# Patient Record
Sex: FEMALE | Race: BLACK OR AFRICAN AMERICAN | Employment: OTHER | URBAN - METROPOLITAN AREA
[De-identification: names, ages, dates, MRNs, and addresses within clinical notes are randomized per-mention and may not be internally consistent; named-entity substitution may affect disease eponyms.]

---

## 2019-02-12 PROBLEM — J45.901 ASTHMA EXACERBATION: Status: ACTIVE | Noted: 2019-02-12

## 2020-01-22 PROBLEM — J45.902 STATUS ASTHMATICUS: Status: ACTIVE | Noted: 2020-01-22

## 2021-10-27 PROBLEM — G45.9 TIA (TRANSIENT ISCHEMIC ATTACK): Status: ACTIVE | Noted: 2021-10-27

## 2022-02-01 ENCOUNTER — HOSPITAL ENCOUNTER (OUTPATIENT)
Dept: CT IMAGING | Age: 67
Discharge: HOME | End: 2022-02-01
Attending: SPECIALIST
Payer: MEDICARE

## 2022-02-01 ENCOUNTER — TRANSCRIBE ORDER (OUTPATIENT)
Dept: SCHEDULING | Age: 67
End: 2022-02-01

## 2022-02-01 DIAGNOSIS — R10.31 RLQ ABDOMINAL PAIN: Primary | ICD-10-CM

## 2022-02-01 DIAGNOSIS — R10.31 RLQ ABDOMINAL PAIN: ICD-10-CM

## 2022-02-01 PROCEDURE — 74176 CT ABD & PELVIS W/O CONTRAST: CPT

## 2022-03-19 PROBLEM — J45.901 ASTHMA EXACERBATION: Status: ACTIVE | Noted: 2019-02-12

## 2022-03-19 PROBLEM — J45.902 STATUS ASTHMATICUS: Status: ACTIVE | Noted: 2020-01-22

## 2022-03-19 PROBLEM — G45.9 TIA (TRANSIENT ISCHEMIC ATTACK): Status: ACTIVE | Noted: 2021-10-27

## 2022-03-23 ENCOUNTER — OFFICE VISIT (OUTPATIENT)
Dept: INTERNAL MEDICINE CLINIC | Age: 67
End: 2022-03-23
Payer: MEDICARE

## 2022-03-23 VITALS
DIASTOLIC BLOOD PRESSURE: 95 MMHG | HEIGHT: 60 IN | WEIGHT: 238 LBS | HEART RATE: 75 BPM | SYSTOLIC BLOOD PRESSURE: 139 MMHG | RESPIRATION RATE: 10 BRPM | BODY MASS INDEX: 46.72 KG/M2 | TEMPERATURE: 98.4 F | OXYGEN SATURATION: 98 %

## 2022-03-23 DIAGNOSIS — Z86.73 HISTORY OF TRANSIENT ISCHEMIC ATTACK (TIA): ICD-10-CM

## 2022-03-23 DIAGNOSIS — Z76.89 ESTABLISHING CARE WITH NEW DOCTOR, ENCOUNTER FOR: Primary | ICD-10-CM

## 2022-03-23 DIAGNOSIS — I10 PRIMARY HYPERTENSION: ICD-10-CM

## 2022-03-23 DIAGNOSIS — E66.01 MORBID OBESITY (HCC): ICD-10-CM

## 2022-03-23 DIAGNOSIS — J45.50 SEVERE PERSISTENT ASTHMA WITHOUT COMPLICATION: ICD-10-CM

## 2022-03-23 DIAGNOSIS — E78.5 HYPERLIPIDEMIA, UNSPECIFIED HYPERLIPIDEMIA TYPE: ICD-10-CM

## 2022-03-23 DIAGNOSIS — Z86.39 HISTORY OF DIABETES MELLITUS: ICD-10-CM

## 2022-03-23 DIAGNOSIS — R60.0 LEG EDEMA: ICD-10-CM

## 2022-03-23 DIAGNOSIS — M15.9 GENERALIZED OA: ICD-10-CM

## 2022-03-23 DIAGNOSIS — Q21.12 PFO (PATENT FORAMEN OVALE): ICD-10-CM

## 2022-03-23 PROCEDURE — G8536 NO DOC ELDER MAL SCRN: HCPCS | Performed by: INTERNAL MEDICINE

## 2022-03-23 PROCEDURE — 1090F PRES/ABSN URINE INCON ASSESS: CPT | Performed by: INTERNAL MEDICINE

## 2022-03-23 PROCEDURE — 3017F COLORECTAL CA SCREEN DOC REV: CPT | Performed by: INTERNAL MEDICINE

## 2022-03-23 PROCEDURE — G8417 CALC BMI ABV UP PARAM F/U: HCPCS | Performed by: INTERNAL MEDICINE

## 2022-03-23 PROCEDURE — G8752 SYS BP LESS 140: HCPCS | Performed by: INTERNAL MEDICINE

## 2022-03-23 PROCEDURE — G8427 DOCREV CUR MEDS BY ELIG CLIN: HCPCS | Performed by: INTERNAL MEDICINE

## 2022-03-23 PROCEDURE — 99204 OFFICE O/P NEW MOD 45 MIN: CPT | Performed by: INTERNAL MEDICINE

## 2022-03-23 PROCEDURE — 1101F PT FALLS ASSESS-DOCD LE1/YR: CPT | Performed by: INTERNAL MEDICINE

## 2022-03-23 PROCEDURE — G8400 PT W/DXA NO RESULTS DOC: HCPCS | Performed by: INTERNAL MEDICINE

## 2022-03-23 PROCEDURE — G8510 SCR DEP NEG, NO PLAN REQD: HCPCS | Performed by: INTERNAL MEDICINE

## 2022-03-23 PROCEDURE — G8755 DIAS BP > OR = 90: HCPCS | Performed by: INTERNAL MEDICINE

## 2022-03-23 RX ORDER — FUROSEMIDE 20 MG/1
20 TABLET ORAL DAILY
COMMUNITY
Start: 2022-03-02

## 2022-03-23 NOTE — PATIENT INSTRUCTIONS

## 2022-03-23 NOTE — PROGRESS NOTES
Chief Complaint   Patient presents with    Establish Care     Pt is here with her mom Vangie Kern    Foot Swelling    Leg Pain     Since Jan/Feb- Pt had ultrasound done 2 weeks ago at cardiologist     1. Have you been to the ER, urgent care clinic since your last visit? Hospitalized since your last visit? No    2. Have you seen or consulted any other health care providers outside of the [address] [place] [address] since your last visit? Include any pap smears or colon screening.  Yes When: Bryan Sosa- Dr. Marcelina Hahn

## 2022-03-23 NOTE — PROGRESS NOTES
Roxy Mcdonough is a 74 y.o. female and presents with [facility] (Pt is here with her mom Yesika Galarza), Foot Swelling, and Leg Pain (Since Jan/Feb- Pt had ultrasound done 2 weeks ago at cardiologist)    . Subjective:    New patient. [facility]  Moved from [facility] 2/5/2022    Pt w c/o katharine le edema chronically. Pt s/p duplex katharine le ordered by cardiology. Has f/u tomorrow    Pt w c/o katharine le weakness since TIA x 2. She did completed inpt SNF stay post hospitalization w little improvement    Chronic le pain. Told she has lumbar DJD (vs DDD)    PMH- HTN   Asthma- Dr. Tracy Centeno of [facility]   OA   Osteoporosis   PFO on chronic AC   H/o TIA   H/o kidneyy stones     PSH- katharine TKR   Gastric bypass    SH-   Lives alone   2 daughters and 4 grandchildren    FH- 6 siblings    HM  mammo- 6/2021  Colonoscopy- 1/22/2019  Immunizations  Eye care ~ 2 yrs ago  Dental care  BMD      Review of Systems  Constitutional: negative for fevers, chills, anorexia and weight loss  Eyes:   negative for visual disturbance and irritation  ENT:   negative for tinnitus,sore throat,nasal congestion,ear pains. hoarseness  Respiratory:  negative for cough, hemoptysis, dyspnea,wheezing  CV:   negative for chest pain, palpitations, lower extremity edema  GI:   negative for nausea, vomiting, diarrhea, abdominal pain,melena  Musculoskel: positive for myalgias, arthralgias, back pain, muscle weakness, joint pain  Neurological:  negative for headaches, dizziness, vertigo, memory problems and gait   Behavl/Psych: negative for feelings of anxiety, depression, mood changes    Past Medical History:   Diagnosis Date    Asthma     Diabetes ([facility] [address]     reports she is borderline diabetic     Hypertension     Kidney stone     Left ureteral stone     Renal colic on left side      Past Surgical History:   Procedure Laterality Date    HX GASTRIC BYPASS  2012    HX KNEE REPLACEMENT Bilateral     2003, 2004    HX OTHER SURGICAL 09/06/2020    Kidney stones removed at [facility] in [place], [place]     Social History     Socioeconomic History    Marital status:    Occupational History    Occupation:  - Part time     Comment: [employer]   Tobacco Use    Smoking status: Never Smoker    Smokeless tobacco: Never Used   Vaping Use    Vaping Use: Never used   Substance and Sexual Activity    Alcohol use: Yes     Comment: occasionally    Drug use: No     Family History   Problem Relation Age of Onset    Hypertension Mother     Hypertension Father     Diabetes Maternal Grandmother        No Known Allergies    Objective:  Visit Vitals  BP (!) 139/95   Pulse 75   Temp 98.4 °F (36.9 °C) (Temporal)   Resp 10   Ht 5' (1.524 m)   Wt 238 lb (108 kg)   SpO2 98%   BMI 46.48 kg/m²     Physical Exam:   General appearance - alert, obese. Pleasant  Mental status - alert, oriented to person, place, and time  EYE-ZAINA, EOMI, corneas normal, no foreign bodies  Nose - normal and patent, no erythema, discharge or polyps  Mouth - mucous membranes moist, POOR dentition  Neck - supple, no significant adenopathy   Chest - clear to auscultation, no wheezes, rales or rhonchi, symmetric air entry   Heart - normal rate, regular rhythm, normal S1, S2  Abdomen - soft, nontender, nondistended, no masses or organomegaly  Ext-obese  Skin-Warm and dry.  no hyperpigmentation, vitiligo, or suspicious lesions  Neuro -alert, oriented, normal speech, walks w cane        Results for orders placed or performed during the hospital encounter of 11/28/21   URINALYSIS W/ RFLX MICROSCOPIC   Result Value Ref Range    Color STRAW YELLOW,STRAW      Appearance CLEAR CLEAR      Glucose NEGATIVE NEGATIVE,Negative mg/dl    Bilirubin NEGATIVE NEGATIVE,Negative      Ketone NEGATIVE NEGATIVE,Negative mg/dl    Specific gravity 1.015 1.005 - 1.030      Blood MODERATE (A) NEGATIVE,Negative      pH (UA) 6.0 5.0 - 9.0      Protein NEGATIVE NEGATIVE,Negative mg/dl Urobilinogen 0.2 0.0 - 1.0 mg/dl    Nitrites NEGATIVE NEGATIVE,Negative      Leukocyte Esterase NEGATIVE NEGATIVE,Negative     CBC WITH AUTOMATED DIFF   Result Value Ref Range    WBC 8.6 4.0 - 11.0 1000/mm3    RBC 3.66 3.60 - 5.20 M/uL    HGB 9.4 (L) 13.0 - 17.2 gm/dl    HCT 29.1 (L) 37.0 - 50.0 %    MCV 79.5 (L) 80.0 - 98.0 fL    MCH 25.7 25.4 - 34.6 pg    MCHC 32.3 30.0 - 36.0 gm/dl    PLATELET 797 841 - 417 1000/mm3    MPV 11.6 (H) 6.0 - 10.0 fL    RDW-SD 42.1 36.4 - 46.3      NRBC 0 0 - 0      IMMATURE GRANULOCYTES 0.4 0.0 - 3.0 %    NEUTROPHILS 69.9 (H) 34 - 64 %    LYMPHOCYTES 20.0 (L) 28 - 48 %    MONOCYTES 9.5 1 - 13 %    EOSINOPHILS 0.0 0 - 5 %    BASOPHILS 0.2 0 - 3 %   METABOLIC PANEL, BASIC   Result Value Ref Range    Sodium 141 136 - 145 mEq/L    Potassium 4.2 3.5 - 5.1 mEq/L    Chloride 111 (H) 98 - 107 mEq/L    CO2 27 21 - 32 mEq/L    Glucose 107 (H) 74 - 106 mg/dl    BUN 8 7 - 25 mg/dl    Creatinine 0.6 0.6 - 1.3 mg/dl    GFR est AA >60.0      GFR est non-AA >60      Calcium 10.2 (H) 8.5 - 10.1 mg/dl    Anion gap 4 (L) 5 - 15 mmol/L   POC URINE MICROSCOPIC   Result Value Ref Range    Epithelial cells, squamous 1-4 /LPF    WBC OCCASIONAL /HPF    RBC 10-14 /HPF    Bacteria 1+ /HPF       Assessment/Plan:    ICD-10-CM ICD-9-CM    1. Establishing care with new doctor, encounter for  Z76.89 V65.8    2. Primary hypertension  G47 711.3 METABOLIC PANEL, COMPREHENSIVE      LIPID PANEL      CBC WITH AUTOMATED DIFF      THYROID CASCADE PROFILE      HEMOGLOBIN A1C WITH EAG      REFERRAL TO OPHTHALMOLOGY      METABOLIC PANEL, COMPREHENSIVE      LIPID PANEL      CBC WITH AUTOMATED DIFF      THYROID CASCADE PROFILE      HEMOGLOBIN A1C WITH EAG   3.  Hyperlipidemia, unspecified hyperlipidemia type  Z62.9 177.5 METABOLIC PANEL, COMPREHENSIVE      LIPID PANEL      CBC WITH AUTOMATED DIFF      THYROID CASCADE PROFILE      HEMOGLOBIN A1C WITH EAG      REFERRAL TO OPHTHALMOLOGY      METABOLIC PANEL, COMPREHENSIVE LIPID PANEL      CBC WITH AUTOMATED DIFF      THYROID CASCADE PROFILE      HEMOGLOBIN A1C WITH EAG   4. Morbid obesity (HCC)  Q96.78 852.86 METABOLIC PANEL, COMPREHENSIVE      LIPID PANEL      CBC WITH AUTOMATED DIFF      THYROID CASCADE PROFILE      HEMOGLOBIN A1C WITH EAG      REFERRAL TO OPHTHALMOLOGY      METABOLIC PANEL, COMPREHENSIVE      LIPID PANEL      CBC WITH AUTOMATED DIFF      THYROID CASCADE PROFILE      HEMOGLOBIN A1C WITH EAG   5. History of diabetes mellitus  Z86.39 V12.29 HEMOGLOBIN A1C WITH EAG      HEMOGLOBIN A1C WITH EAG   6. History of transient ischemic attack (TIA)  Z86.73 V12.54    7. Leg edema  R60.0 782.3    8. Generalized OA  M15.9 715.00    9. Severe persistent asthma without complication  V59.69 654.17      Orders Placed This Encounter    METABOLIC PANEL, COMPREHENSIVE     Standing Status:   Future     Number of Occurrences:   1     Standing Expiration Date:   3/23/2023    LIPID PANEL     Standing Status:   Future     Number of Occurrences:   1     Standing Expiration Date:   3/23/2023    CBC WITH AUTOMATED DIFF     Standing Status:   Future     Number of Occurrences:   1     Standing Expiration Date:   3/23/2023    THYROID CASCADE PROFILE     Standing Status:   Future     Number of Occurrences:   1     Standing Expiration Date:   3/23/2023    HEMOGLOBIN A1C WITH EAG     Standing Status:   Future     Number of Occurrences:   1     Standing Expiration Date:   3/23/2023    REFERRAL TO OPHTHALMOLOGY     Referral Priority:   Routine     Referral Type:   Consultation     Referral Reason:   Specialty Services Required     Requested Specialty:   Ophthalmology     Number of Visits Requested:   1    furosemide (LASIX) 20 mg tablet     Sig: Take 20 mg by mouth daily. 1. Establishing care with new doctor, encounter for  Completed    2. Primary hypertension    - METABOLIC PANEL, COMPREHENSIVE; Future  - LIPID PANEL; Future  - CBC WITH AUTOMATED DIFF;  Future  - THYROID CASCADE PROFILE; Future  - HEMOGLOBIN A1C WITH EAG; Future  - REFERRAL TO OPHTHALMOLOGY  - METABOLIC PANEL, COMPREHENSIVE  - LIPID PANEL  - CBC WITH AUTOMATED DIFF  - THYROID CASCADE PROFILE  - HEMOGLOBIN A1C WITH EAG    3. Hyperlipidemia, unspecified hyperlipidemia type    - METABOLIC PANEL, COMPREHENSIVE; Future  - LIPID PANEL; Future  - CBC WITH AUTOMATED DIFF; Future  - THYROID CASCADE PROFILE; Future  - HEMOGLOBIN A1C WITH EAG; Future  - REFERRAL TO OPHTHALMOLOGY  - METABOLIC PANEL, COMPREHENSIVE  - LIPID PANEL  - CBC WITH AUTOMATED DIFF  - THYROID CASCADE PROFILE  - HEMOGLOBIN A1C WITH EAG    4. Morbid obesity ([address]  D/w pt daily walking (at least 20 minutes), healthy diet w fruits and/or veggies w each meal, portion sizes and weight loss    - METABOLIC PANEL, COMPREHENSIVE; Future  - LIPID PANEL; Future  - CBC WITH AUTOMATED DIFF; Future  - THYROID CASCADE PROFILE; Future  - HEMOGLOBIN A1C WITH EAG; Future  - REFERRAL TO OPHTHALMOLOGY  - METABOLIC PANEL, COMPREHENSIVE  - LIPID PANEL  - CBC WITH AUTOMATED DIFF  - THYROID CASCADE PROFILE  - HEMOGLOBIN A1C WITH EAG    5. History of diabetes mellitus    - HEMOGLOBIN A1C WITH EAG; Future  - HEMOGLOBIN A1C WITH EAG    6. History of transient ischemic attack (TIA)  Noted  [facility]    7. Leg edema  D/w pt daily walking/le elevation and low salt diet    8. Generalized OA  Noted  [facility]    9. Severe persistent asthma without complication  Cont current tx    10. PFO (patent foramen ovale)  F/u cards    Patient Instructions        Low Sodium Diet (2,000 Milligram): Care Instructions  Overview     Limiting sodium can be an important part of managing some health problems. The most common source of sodium is salt. People get most of the salt in their diet from canned, prepared, and packaged foods. Fast food and restaurant meals also are very high in sodium. Your doctor will probably limit your sodium to less than 2,000 milligrams (mg) a day.  This limit counts all the sodium in prepared and packaged foods and any salt you add to your food. Follow-up care is a key part of your treatment and safety. Be sure to make and go to all appointments, and call your doctor if you are having problems. It's also a good idea to know your test results and keep a list of the medicines you take. How can you care for yourself at home? Read food labels  · Read labels on cans and food packages. The labels tell you how much sodium is in each serving. Make sure that you look at the serving size. If you eat more than the serving size, you have eaten more sodium. · Food labels also tell you the Percent Daily Value for sodium. Choose products with low Percent Daily Values for sodium. · Be aware that sodium can come in forms other than salt, including monosodium glutamate (MSG), sodium citrate, and sodium bicarbonate (baking soda). MSG is often added to Asian food. When you eat out, you can sometimes ask for food without MSG or added salt. Buy low-sodium foods  · Buy foods that are labeled \"unsalted\" (no salt added), \"sodium-free\" (less than 5 mg of sodium per serving), or \"low-sodium\" (140 mg or less of sodium per serving). Foods labeled \"reduced-sodium\" and \"light sodium\" may still have too much sodium. Be sure to read the label to see how much sodium you are getting. · Buy fresh vegetables, or frozen vegetables without added sauces. Buy low-sodium versions of canned vegetables, soups, and other canned goods. Prepare low-sodium meals  · Cut back on the amount of salt you use in cooking. This will help you adjust to the taste. Do not add salt after cooking. One teaspoon of salt has about 2,300 mg of sodium. · Take the salt shaker off the table. · Flavor your food with garlic, lemon juice, onion, vinegar, herbs, and spices. Do not use soy sauce, lite soy sauce, steak sauce, onion salt, garlic salt, celery salt, or ketchup on your food.   · Use low-sodium salad dressings, sauces, and ketchup. Or make your own salad dressings and sauces without adding salt. · Use less salt (or none) when recipes call for it. You can often use half the salt a recipe calls for without losing flavor. Other foods such as rice, pasta, and grains do not need added salt. · Rinse canned vegetables, and cook them in fresh water. This removes some--but not all--of the salt. · Avoid water that is naturally high in sodium or that has been treated with water softeners, which add sodium. If you buy bottled water, read the label and choose a sodium-free brand. Avoid high-sodium foods  · Avoid eating:  ? Smoked, cured, salted, and canned meat, fish, and poultry. ? Ham, sneed, hot dogs, and luncheon meats. ? Regular, hard, and processed cheese and regular peanut butter. ? Crackers with salted tops, and other salted snack foods such as pretzels, chips, and salted popcorn. ? Frozen prepared meals, unless labeled low-sodium. ? Canned and dried soups, broths, and bouillon, unless labeled sodium-free or low-sodium. ? Canned vegetables, unless labeled sodium-free or low-sodium. ? Western Brenda fries, pizza, tacos, and other fast foods. ? Pickles, olives, ketchup, and other condiments, especially soy sauce, unless labeled sodium-free or low-sodium. Where can you learn more? Go to http://www.gray.com/  Enter V843 in the search box to learn more about \"Low Sodium Diet (2,000 Milligram): Care Instructions. \"  Current as of: September 8, 2021               Content Version: 13.2  © 5798-4741 [employer]. Care instructions adapted under license by [employer] (which disclaims liability or warranty for this information). If you have questions about a medical condition or this instruction, always ask your healthcare professional. [address]  any warranty or liability for your use of this information.          Follow-up and Dispositions    · Return in about 8 weeks (around 5/18/2022). I have reviewed with the patient details of the assessment and plan and all questions were answered. Relevent patient education was performed. The most recent lab findings were reviewed with the patient. An After Visit Summary was printed and given to the patient.       Aravind Carr MD

## 2022-03-24 PROBLEM — E78.5 HYPERLIPIDEMIA, UNSPECIFIED HYPERLIPIDEMIA TYPE: Status: ACTIVE | Noted: 2022-03-23

## 2022-03-24 PROBLEM — Z86.73 HISTORY OF TRANSIENT ISCHEMIC ATTACK (TIA): Status: ACTIVE | Noted: 2022-03-23

## 2022-03-24 PROBLEM — I10 PRIMARY HYPERTENSION: Status: ACTIVE | Noted: 2022-03-23

## 2022-03-24 PROBLEM — E66.01 MORBID OBESITY (HCC): Status: ACTIVE | Noted: 2022-03-23

## 2022-03-24 PROBLEM — Q21.12 PFO (PATENT FORAMEN OVALE): Status: ACTIVE | Noted: 2022-03-23

## 2022-03-24 PROBLEM — J45.50 SEVERE PERSISTENT ASTHMA WITHOUT COMPLICATION: Status: ACTIVE | Noted: 2022-03-23

## 2022-03-24 PROBLEM — Z86.39 HISTORY OF DIABETES MELLITUS: Status: ACTIVE | Noted: 2022-03-23

## 2022-03-24 PROBLEM — R60.0 LEG EDEMA: Status: ACTIVE | Noted: 2022-03-23

## 2022-03-24 PROBLEM — M15.9 GENERALIZED OA: Status: ACTIVE | Noted: 2022-03-23

## 2022-03-24 LAB
ALBUMIN SERPL-MCNC: 4.2 G/DL (ref 3.8–4.8)
ALBUMIN/GLOB SERPL: 1.9 {RATIO} (ref 1.2–2.2)
ALP SERPL-CCNC: 121 IU/L (ref 44–121)
ALT SERPL-CCNC: 23 IU/L (ref 0–32)
AST SERPL-CCNC: 26 IU/L (ref 0–40)
BASOPHILS # BLD AUTO: 0 X10E3/UL (ref 0–0.2)
BASOPHILS NFR BLD AUTO: 0 %
BILIRUB SERPL-MCNC: <0.2 MG/DL (ref 0–1.2)
BUN SERPL-MCNC: 13 MG/DL (ref 8–27)
BUN/CREAT SERPL: 17 (ref 12–28)
CALCIUM SERPL-MCNC: 10 MG/DL (ref 8.7–10.3)
CHLORIDE SERPL-SCNC: 102 MMOL/L (ref 96–106)
CHOLEST SERPL-MCNC: 181 MG/DL (ref 100–199)
CO2 SERPL-SCNC: 24 MMOL/L (ref 20–29)
CREAT SERPL-MCNC: 0.77 MG/DL (ref 0.57–1)
EGFR: 84 ML/MIN/1.73
EOSINOPHIL # BLD AUTO: 0 X10E3/UL (ref 0–0.4)
EOSINOPHIL NFR BLD AUTO: 0 %
ERYTHROCYTE [DISTWIDTH] IN BLOOD BY AUTOMATED COUNT: 13.7 % (ref 11.7–15.4)
EST. AVERAGE GLUCOSE BLD GHB EST-MCNC: 123 MG/DL
GLOBULIN SER CALC-MCNC: 2.2 G/DL (ref 1.5–4.5)
GLUCOSE SERPL-MCNC: 86 MG/DL (ref 65–99)
HBA1C MFR BLD: 5.9 % (ref 4.8–5.6)
HCT VFR BLD AUTO: 40.9 % (ref 34–46.6)
HDLC SERPL-MCNC: 92 MG/DL
HGB BLD-MCNC: 13.5 G/DL (ref 11.1–15.9)
IMM GRANULOCYTES # BLD AUTO: 0 X10E3/UL (ref 0–0.1)
IMM GRANULOCYTES NFR BLD AUTO: 0 %
IMP & REVIEW OF LAB RESULTS: NORMAL
LDLC SERPL CALC-MCNC: 77 MG/DL (ref 0–99)
LYMPHOCYTES # BLD AUTO: 2.4 X10E3/UL (ref 0.7–3.1)
LYMPHOCYTES NFR BLD AUTO: 32 %
MCH RBC QN AUTO: 25.7 PG (ref 26.6–33)
MCHC RBC AUTO-ENTMCNC: 33 G/DL (ref 31.5–35.7)
MCV RBC AUTO: 78 FL (ref 79–97)
MONOCYTES # BLD AUTO: 0.6 X10E3/UL (ref 0.1–0.9)
MONOCYTES NFR BLD AUTO: 8 %
NEUTROPHILS # BLD AUTO: 4.4 X10E3/UL (ref 1.4–7)
NEUTROPHILS NFR BLD AUTO: 60 %
PLATELET # BLD AUTO: 213 X10E3/UL (ref 150–450)
POTASSIUM SERPL-SCNC: 3.9 MMOL/L (ref 3.5–5.2)
PROT SERPL-MCNC: 6.4 G/DL (ref 6–8.5)
RBC # BLD AUTO: 5.26 X10E6/UL (ref 3.77–5.28)
SODIUM SERPL-SCNC: 143 MMOL/L (ref 134–144)
TRIGL SERPL-MCNC: 60 MG/DL (ref 0–149)
TSH SERPL DL<=0.005 MIU/L-ACNC: 0.93 UIU/ML (ref 0.45–4.5)
VLDLC SERPL CALC-MCNC: 12 MG/DL (ref 5–40)
WBC # BLD AUTO: 7.5 X10E3/UL (ref 3.4–10.8)

## 2022-06-01 ENCOUNTER — OFFICE VISIT (OUTPATIENT)
Dept: INTERNAL MEDICINE CLINIC | Age: 67
End: 2022-06-01
Payer: MEDICARE

## 2022-06-01 VITALS
DIASTOLIC BLOOD PRESSURE: 89 MMHG | HEART RATE: 78 BPM | WEIGHT: 239 LBS | SYSTOLIC BLOOD PRESSURE: 143 MMHG | RESPIRATION RATE: 18 BRPM | TEMPERATURE: 97 F | HEIGHT: 60 IN | OXYGEN SATURATION: 98 % | BODY MASS INDEX: 46.92 KG/M2

## 2022-06-01 DIAGNOSIS — E78.5 HYPERLIPIDEMIA, UNSPECIFIED HYPERLIPIDEMIA TYPE: ICD-10-CM

## 2022-06-01 DIAGNOSIS — J45.50 SEVERE PERSISTENT ASTHMA WITHOUT COMPLICATION: ICD-10-CM

## 2022-06-01 DIAGNOSIS — Z98.84 BARIATRIC SURGERY STATUS: ICD-10-CM

## 2022-06-01 DIAGNOSIS — E66.01 MORBID OBESITY (HCC): ICD-10-CM

## 2022-06-01 DIAGNOSIS — Z86.73 HISTORY OF TRANSIENT ISCHEMIC ATTACK (TIA): ICD-10-CM

## 2022-06-01 DIAGNOSIS — M15.9 GENERALIZED OA: ICD-10-CM

## 2022-06-01 DIAGNOSIS — I10 PRIMARY HYPERTENSION: Primary | ICD-10-CM

## 2022-06-01 DIAGNOSIS — Q21.12 PFO (PATENT FORAMEN OVALE): ICD-10-CM

## 2022-06-01 DIAGNOSIS — Z12.31 BREAST CANCER SCREENING BY MAMMOGRAM: ICD-10-CM

## 2022-06-01 PROBLEM — G45.9 TIA (TRANSIENT ISCHEMIC ATTACK): Status: RESOLVED | Noted: 2021-10-27 | Resolved: 2022-06-01

## 2022-06-01 PROBLEM — J45.902 STATUS ASTHMATICUS: Status: RESOLVED | Noted: 2020-01-22 | Resolved: 2022-06-01

## 2022-06-01 PROBLEM — J45.901 ASTHMA EXACERBATION: Status: RESOLVED | Noted: 2019-02-12 | Resolved: 2022-06-01

## 2022-06-01 PROCEDURE — G8754 DIAS BP LESS 90: HCPCS | Performed by: INTERNAL MEDICINE

## 2022-06-01 PROCEDURE — G8536 NO DOC ELDER MAL SCRN: HCPCS | Performed by: INTERNAL MEDICINE

## 2022-06-01 PROCEDURE — G8400 PT W/DXA NO RESULTS DOC: HCPCS | Performed by: INTERNAL MEDICINE

## 2022-06-01 PROCEDURE — G8510 SCR DEP NEG, NO PLAN REQD: HCPCS | Performed by: INTERNAL MEDICINE

## 2022-06-01 PROCEDURE — G8427 DOCREV CUR MEDS BY ELIG CLIN: HCPCS | Performed by: INTERNAL MEDICINE

## 2022-06-01 PROCEDURE — G8417 CALC BMI ABV UP PARAM F/U: HCPCS | Performed by: INTERNAL MEDICINE

## 2022-06-01 PROCEDURE — 3017F COLORECTAL CA SCREEN DOC REV: CPT | Performed by: INTERNAL MEDICINE

## 2022-06-01 PROCEDURE — 99214 OFFICE O/P EST MOD 30 MIN: CPT | Performed by: INTERNAL MEDICINE

## 2022-06-01 PROCEDURE — 1090F PRES/ABSN URINE INCON ASSESS: CPT | Performed by: INTERNAL MEDICINE

## 2022-06-01 PROCEDURE — G9899 SCRN MAM PERF RSLTS DOC: HCPCS | Performed by: INTERNAL MEDICINE

## 2022-06-01 PROCEDURE — G8753 SYS BP > OR = 140: HCPCS | Performed by: INTERNAL MEDICINE

## 2022-06-01 PROCEDURE — 1101F PT FALLS ASSESS-DOCD LE1/YR: CPT | Performed by: INTERNAL MEDICINE

## 2022-06-01 RX ORDER — ATORVASTATIN CALCIUM 40 MG/1
40 TABLET, FILM COATED ORAL
Qty: 90 TABLET | Refills: 1 | Status: SHIPPED | OUTPATIENT
Start: 2022-06-01

## 2022-06-01 RX ORDER — CHOLECALCIFEROL (VITAMIN D3) 125 MCG
1 CAPSULE ORAL DAILY
Qty: 90 TABLET | Refills: 5 | Status: SHIPPED | OUTPATIENT
Start: 2022-06-01

## 2022-06-01 NOTE — PROGRESS NOTES
Marito Cordova is a 74 y.o. female and presents with Follow-up (Questions about Lipitor may need refills  and Vitamin D)    . Subjective:    Second appointmemt (mother is Marcela Musa)  Moved from [facility] 2/5/2022    Pt w c/o katharine le edema chronically. Pt s/p duplex katharine le ordered by cardiology. Negative    Pt was referred by cardiology to neurology re: closure of PFO    Pt w c/o katharine le weakness since TIA x 2. She is enrolled in PT- [facility] in [facility]    Chronic le pain. Told she has lumbar DJD (vs DDD)    Pt is very interested in simplifying her med regimen. PMH- HTN  BP Readings from Last 3 Encounters:   06/01/22 (!) 143/89   03/23/22 (!) 139/95   01/26/22 (!) 142/89        Asthma- Dr. Benitez Wilson of [facility]  Lab Results   Component Value Date/Time    Cholesterol, total 181 03/23/2022 02:39 PM    HDL Cholesterol 92 03/23/2022 02:39 PM    LDL, calculated 77 03/23/2022 02:39 PM    LDL, calculated 41 10/28/2021 04:34 AM    VLDL, calculated 12 03/23/2022 02:39 PM    Triglyceride 60 03/23/2022 02:39 PM    CHOL/HDL Ratio 1.5 10/28/2021 04:34 AM      OA   Osteoporosis   PFO on chronic AC   H/o TIA   H/o kidney stones     PSH- katharine TKR   Gastric bypass    SH-   Lives alone   2 daughters and 4 grandchildren    FH- 6 siblings    HM  mammo- 6/2021  Colonoscopy- 1/22/2019  Immunizations  Eye care ~ 2 yrs ago  Dental care  BMD      Review of Systems  Constitutional: negative for fevers, chills, anorexia and weight loss  Eyes:   negative for visual disturbance and irritation  ENT:   negative for tinnitus,sore throat,nasal congestion,ear pains. hoarseness  Respiratory:  negative for cough, hemoptysis, dyspnea,wheezing  CV:   negative for chest pain, palpitations, lower extremity edema  GI:   negative for nausea, vomiting, diarrhea, abdominal pain,melena  Musculoskel: positive for myalgias, arthralgias, back pain, muscle weakness, joint pain  Neurological:  negative for headaches, dizziness, vertigo, memory problems and gait   Behavl/Psych: negative for feelings of anxiety, depression, mood changes    Past Medical History:   Diagnosis Date    Asthma     Diabetes ([address]     reports she is borderline diabetic     Hypertension     Kidney stone     Left ureteral stone     Renal colic on left side      Past Surgical History:   Procedure Laterality Date    HX GASTRIC BYPASS  2012    HX KNEE REPLACEMENT Bilateral     2003, 2004    HX OTHER SURGICAL  09/06/2020    Kidney stones removed at [facility] in [address], [address] [facility]     Social History     Socioeconomic History    Marital status:    Occupational History    Occupation:  - Part time     Comment: [employer]   Tobacco Use    Smoking status: Never Smoker    Smokeless tobacco: Never Used   Vaping Use    Vaping Use: Never used   Substance and Sexual Activity    Alcohol use: Yes     Comment: occasionally    Drug use: No     Family History   Problem Relation Age of Onset    Hypertension Mother     Hypertension Father     Diabetes Maternal Grandmother        No Known Allergies    Objective:  Visit Vitals  BP (!) 143/89 (BP 1 Location: Left upper arm, BP Patient Position: Sitting, BP Cuff Size: Large adult)   Pulse 78   Temp 97 °F (36.1 °C) (Temporal)   Resp 18   Ht 5' (1.524 m)   Wt 239 lb (108.4 kg)   SpO2 98%   BMI 46.68 kg/m²     Physical Exam:   General appearance - alert, obese. Pleasant  Mental status - alert, oriented to person, place, and time  EYE-ZAINA, EOMI, corneas normal, no foreign bodies  Nose - normal and patent, no erythema, discharge or polyps  Mouth - mucous membranes moist, POOR dentition  Neck - supple, no significant adenopathy   Chest - clear to auscultation, no wheezes, rales or rhonchi, symmetric air entry   Heart - normal rate, regular rhythm, normal S1, S2  Abdomen - soft, nontender, nondistended, no masses or organomegaly  Ext-obese  Skin-Warm and dry.  no hyperpigmentation, vitiligo, or suspicious lesions  Neuro -alert, oriented, normal speech, walks w cane        Results for orders placed or performed in visit on 23/02/87   METABOLIC PANEL, COMPREHENSIVE   Result Value Ref Range    Glucose 86 65 - 99 mg/dL    BUN 13 8 - 27 mg/dL    Creatinine 0.77 0.57 - 1.00 mg/dL    eGFR 84 >59 mL/min/1.73    BUN/Creatinine ratio 17 12 - 28    Sodium 143 134 - 144 mmol/L    Potassium 3.9 3.5 - 5.2 mmol/L    Chloride 102 96 - 106 mmol/L    CO2 24 20 - 29 mmol/L    Calcium 10.0 8.7 - 10.3 mg/dL    Protein, total 6.4 6.0 - 8.5 g/dL    Albumin 4.2 3.8 - 4.8 g/dL    GLOBULIN, TOTAL 2.2 1.5 - 4.5 g/dL    A-G Ratio 1.9 1.2 - 2.2    Bilirubin, total <0.2 0.0 - 1.2 mg/dL    Alk. phosphatase 121 44 - 121 IU/L    AST (SGOT) 26 0 - 40 IU/L    ALT (SGPT) 23 0 - 32 IU/L   LIPID PANEL   Result Value Ref Range    Cholesterol, total 181 100 - 199 mg/dL    Triglyceride 60 0 - 149 mg/dL    HDL Cholesterol 92 >39 mg/dL    VLDL, calculated 12 5 - 40 mg/dL    LDL, calculated 77 0 - 99 mg/dL   CBC WITH AUTOMATED DIFF   Result Value Ref Range    WBC 7.5 3.4 - 10.8 x10E3/uL    RBC 5.26 3.77 - 5.28 x10E6/uL    HGB 13.5 11.1 - 15.9 g/dL    HCT 40.9 34.0 - 46.6 %    MCV 78 (L) 79 - 97 fL    MCH 25.7 (L) 26.6 - 33.0 pg    MCHC 33.0 31.5 - 35.7 g/dL    RDW 13.7 11.7 - 15.4 %    PLATELET 182 461 - 637 x10E3/uL    NEUTROPHILS 60 Not Estab. %    Lymphocytes 32 Not Estab. %    MONOCYTES 8 Not Estab. %    EOSINOPHILS 0 Not Estab. %    BASOPHILS 0 Not Estab. %    ABS. NEUTROPHILS 4.4 1.4 - 7.0 x10E3/uL    Abs Lymphocytes 2.4 0.7 - 3.1 x10E3/uL    ABS. MONOCYTES 0.6 0.1 - 0.9 x10E3/uL    ABS. EOSINOPHILS 0.0 0.0 - 0.4 x10E3/uL    ABS. BASOPHILS 0.0 0.0 - 0.2 x10E3/uL    IMMATURE GRANULOCYTES 0 Not Estab. %    ABS. IMM.  GRANS. 0.0 0.0 - 0.1 x10E3/uL   THYROID CASCADE PROFILE   Result Value Ref Range    TSH 0.925 0.450 - 4.500 uIU/mL   HEMOGLOBIN A1C WITH EAG   Result Value Ref Range    Hemoglobin A1c 5.9 (H) 4.8 - 5.6 %    Estimated average glucose 123 mg/dL CVD REPORT   Result Value Ref Range    INTERPRETATION Note        Assessment/Plan:    ICD-10-CM ICD-9-CM    1. Primary hypertension  I10 401.9    2. Hyperlipidemia, unspecified hyperlipidemia type  E78.5 272.4    3. History of transient ischemic attack (TIA)  Z86.73 V12.54 atorvastatin (LIPITOR) 40 mg tablet   4. Morbid obesity ([address]  E66.01 278.01    5. Generalized OA  M15.9 715.00    6. Severe persistent asthma without complication  A62.61 348.00    7. Bariatric surgery status  Z98.84 V45.86    8. PFO (patent foramen ovale)  Q21.1 745.5    9. Breast cancer screening by mammogram  Z12.31 V76.12 [facility] MAMMO BI SCREENING INCL CAD     Orders Placed This Encounter    [facility] MAMMO BI SCREENING INCL CAD     Standing Status:   Future     Standing Expiration Date:   12/1/2022    cholecalciferol, vitamin D3, (Vitamin D3) 50 mcg (2,000 unit) tab     Sig: Take 1 Tablet by mouth daily. Dispense:  90 Tablet     Refill:  5    atorvastatin (LIPITOR) 40 mg tablet     Sig: Take 1 Tablet by mouth nightly. Dispense:  90 Tablet     Refill:  1     1. Primary hypertension  NOT ideal  Cont amlodipine  May be contributing to pts le edema. T/c change    2. Hyperlipidemia, unspecified hyperlipidemia type  Cont statin    3. History of transient ischemic attack (TIA)    - atorvastatin (LIPITOR) 40 mg tablet; Take 1 Tablet by mouth nightly. Dispense: 90 Tablet; Refill: 1    4. Morbid obesity ([facility] [address] [facility]  D/w pt daily walking (at least 20 minutes), healthy diet w fruits and/or veggies w each meal, portion sizes and weight loss    5. Generalized OA  Cont PT    6. Severe persistent asthma without complication  Quiescent    7. Bariatric surgery status  Noted    8. PFO (patent foramen ovale)  F/u cards and neuro    9. Breast cancer screening by mammogram    - [facility] MAMMO BI SCREENING INCL CAD; Future      There are no Patient Instructions on file for this visit. Follow-up and Dispositions    · Return in about 4 months (around 10/1/2022). I have reviewed with the patient details of the assessment and plan and all questions were answered. Relevent patient education was performed. The most recent lab findings were reviewed with the patient. An After Visit Summary was printed and given to the patient.       Rosmery Davies MD

## 2022-06-01 NOTE — PROGRESS NOTES
Seymour Navarro is a 74 y.o. female  Chief Complaint   Patient presents with    Follow-up     Questions about Lipitor may need refills  and Vitamin D     1. Have you been to the ER, no urgent care clinic since your last visit?no  Hospitalized since your last visit? no  2. Have you seen or consulted any other health care providers outside of the [address] [place] [address] since your last visit? no  Include any pap smears or colon screening.   no

## 2022-06-27 ENCOUNTER — HOSPITAL ENCOUNTER (OUTPATIENT)
Dept: MAMMOGRAPHY | Age: 67
Discharge: HOME | End: 2022-06-27
Payer: MEDICARE

## 2022-06-27 DIAGNOSIS — Z12.31 BREAST CANCER SCREENING BY MAMMOGRAM: ICD-10-CM

## 2022-06-27 PROCEDURE — 77063 BREAST TOMOSYNTHESIS BI: CPT

## 2022-10-03 ENCOUNTER — OFFICE VISIT (OUTPATIENT)
Dept: INTERNAL MEDICINE CLINIC | Age: 67
End: 2022-10-03
Payer: MEDICARE

## 2022-10-03 VITALS
HEIGHT: 60 IN | HEART RATE: 95 BPM | RESPIRATION RATE: 17 BRPM | TEMPERATURE: 98.4 F | OXYGEN SATURATION: 98 % | BODY MASS INDEX: 47.74 KG/M2 | DIASTOLIC BLOOD PRESSURE: 90 MMHG | SYSTOLIC BLOOD PRESSURE: 150 MMHG | WEIGHT: 243.2 LBS

## 2022-10-03 DIAGNOSIS — Q21.12 PFO (PATENT FORAMEN OVALE): ICD-10-CM

## 2022-10-03 DIAGNOSIS — Z00.00 ENCOUNTER FOR MEDICARE ANNUAL WELLNESS EXAM: ICD-10-CM

## 2022-10-03 DIAGNOSIS — M15.9 GENERALIZED OA: ICD-10-CM

## 2022-10-03 DIAGNOSIS — I87.2 CHRONIC VENOUS INSUFFICIENCY: ICD-10-CM

## 2022-10-03 DIAGNOSIS — E78.5 HYPERLIPIDEMIA, UNSPECIFIED HYPERLIPIDEMIA TYPE: ICD-10-CM

## 2022-10-03 DIAGNOSIS — I10 PRIMARY HYPERTENSION: Primary | ICD-10-CM

## 2022-10-03 DIAGNOSIS — Z98.84 BARIATRIC SURGERY STATUS: ICD-10-CM

## 2022-10-03 DIAGNOSIS — J45.50 SEVERE PERSISTENT ASTHMA WITHOUT COMPLICATION: ICD-10-CM

## 2022-10-03 DIAGNOSIS — Z23 ENCOUNTER FOR IMMUNIZATION: ICD-10-CM

## 2022-10-03 DIAGNOSIS — E66.01 MORBID OBESITY (HCC): ICD-10-CM

## 2022-10-03 PROCEDURE — 1090F PRES/ABSN URINE INCON ASSESS: CPT | Performed by: INTERNAL MEDICINE

## 2022-10-03 PROCEDURE — 90694 VACC AIIV4 NO PRSRV 0.5ML IM: CPT | Performed by: INTERNAL MEDICINE

## 2022-10-03 PROCEDURE — G9899 SCRN MAM PERF RSLTS DOC: HCPCS | Performed by: INTERNAL MEDICINE

## 2022-10-03 PROCEDURE — G0008 ADMIN INFLUENZA VIRUS VAC: HCPCS | Performed by: INTERNAL MEDICINE

## 2022-10-03 PROCEDURE — G8536 NO DOC ELDER MAL SCRN: HCPCS | Performed by: INTERNAL MEDICINE

## 2022-10-03 PROCEDURE — G8753 SYS BP > OR = 140: HCPCS | Performed by: INTERNAL MEDICINE

## 2022-10-03 PROCEDURE — G0439 PPPS, SUBSEQ VISIT: HCPCS | Performed by: INTERNAL MEDICINE

## 2022-10-03 PROCEDURE — G8510 SCR DEP NEG, NO PLAN REQD: HCPCS | Performed by: INTERNAL MEDICINE

## 2022-10-03 PROCEDURE — 3017F COLORECTAL CA SCREEN DOC REV: CPT | Performed by: INTERNAL MEDICINE

## 2022-10-03 PROCEDURE — 1101F PT FALLS ASSESS-DOCD LE1/YR: CPT | Performed by: INTERNAL MEDICINE

## 2022-10-03 PROCEDURE — G8755 DIAS BP > OR = 90: HCPCS | Performed by: INTERNAL MEDICINE

## 2022-10-03 PROCEDURE — G8427 DOCREV CUR MEDS BY ELIG CLIN: HCPCS | Performed by: INTERNAL MEDICINE

## 2022-10-03 PROCEDURE — G8417 CALC BMI ABV UP PARAM F/U: HCPCS | Performed by: INTERNAL MEDICINE

## 2022-10-03 PROCEDURE — G8400 PT W/DXA NO RESULTS DOC: HCPCS | Performed by: INTERNAL MEDICINE

## 2022-10-03 PROCEDURE — 99214 OFFICE O/P EST MOD 30 MIN: CPT | Performed by: INTERNAL MEDICINE

## 2022-10-03 RX ORDER — AMLODIPINE AND BENAZEPRIL HYDROCHLORIDE 5; 20 MG/1; MG/1
1 CAPSULE ORAL DAILY
Qty: 60 CAPSULE | Refills: 2 | Status: SHIPPED | OUTPATIENT
Start: 2022-10-03

## 2022-10-03 NOTE — PROGRESS NOTES
Rm    Chief Complaint   Patient presents with    Hypertension     Follow up    Cholesterol Problem     Follow up        Visit Vitals  BP (!) 157/96 (BP 1 Location: Left upper arm, BP Patient Position: Sitting, BP Cuff Size: Large adult)   Pulse 95   Temp 98.4 °F (36.9 °C) (Temporal)   Resp 17   Ht 5' (1.524 m)   Wt 243 lb 3.2 oz (110.3 kg)   SpO2 98%   BMI 47.50 kg/m²        1. Have you been to the ER, urgent care clinic since your last visit? Hospitalized since your last visit? No    2. Have you seen or consulted any other health care providers outside of the [address] [place] [address] since your last visit? Include any pap smears or colon screening. No     Health Maintenance Due   Topic Date Due    Hepatitis C Screening  Never done    Colorectal Cancer Screening Combo  Never done    Bone Densitometry (Dexa) Screening  Never done    Medicare Yearly Exam  Never done    Shingrix Vaccine Age 50> (2 of 2) 11/12/2019    Pneumococcal 65+ years (2 - PCV) 11/16/2021    DTaP/Tdap/Td series (2 - Td or Tdap) 03/28/2022    COVID-19 Vaccine (3 - Booster for Moderna series) 06/25/2022    Flu Vaccine (1) 08/01/2022        3 most recent PHQ Screens 10/3/2022   Little interest or pleasure in doing things Not at all   Feeling down, depressed, irritable, or hopeless Not at all   Total Score PHQ 2 0        Fall Risk Assessment, last 12 mths 10/3/2022   Able to walk? Yes   Fall in past 12 months? 0   Do you feel unsteady? 0   Are you worried about falling 0       No flowsheet data found.

## 2022-10-03 NOTE — PROGRESS NOTES
Danna Vance is a 74 y.o. female and presents with Hypertension (Follow up) and Cholesterol Problem (Follow up)    . Subjective: Third appointmemt (mother is Lennox Beverage)  Moved from [facility] 2/5/2022    Pt w c/o katharine le edema chronically. Pt s/p duplex katharine le ordered by cardiology. Negative for DVT    Pt was referred by cardiology to neurology re: closure of PFO. ?will surgically close    Pt w c/o katharine le weakness since TIA x 2. Chronic le pain. 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 Daniel Mancini of [facility]  Lab Results   Component Value Date/Time    Cholesterol, total 181 03/23/2022 02:39 PM    HDL Cholesterol 92 03/23/2022 02:39 PM    LDL, calculated 77 03/23/2022 02:39 PM    LDL, calculated 41 10/28/2021 04:34 AM    VLDL, calculated 12 03/23/2022 02:39 PM    Triglyceride 60 03/23/2022 02:39 PM    CHOL/HDL Ratio 1.5 10/28/2021 04:34 AM      OA   Osteoporosis   PFO on chronic AC   H/o TIA   H/o kidney stones     PSH- katharine TKR   Gastric bypass    SH-   Lives alone   2 daughters and 4 grandchildren    FH- 6 siblings    HM  mammo- UTD  Colonoscopy- 1/22/2019  Immunizations  Eye care ~ 2 yrs ago  Dental care  BMD      Review of Systems  Constitutional: negative for fevers, chills, anorexia and weight loss  Eyes:   negative for visual disturbance and irritation  ENT:   negative for tinnitus,sore throat,nasal congestion,ear pains. hoarseness  Respiratory:  negative for cough, hemoptysis, dyspnea,wheezing  CV:   negative for chest pain, palpitations, lower extremity edema  GI:   negative for nausea, vomiting, diarrhea, abdominal pain,melena  Musculoskel: positive for myalgias, arthralgias, back pain, muscle weakness, joint pain  Neurological:  negative for headaches, dizziness, vertigo, memory problems and gait   Behavl/Psych: negative for feelings of anxiety, depression, mood changes    Past Medical History:   Diagnosis Date    Asthma     Diabetes ([address]     reports she is borderline diabetic     Hypertension     Kidney stone     Left ureteral stone     Renal colic on left side      Past Surgical History:   Procedure Laterality Date    HX GASTRIC BYPASS  2012    HX KNEE REPLACEMENT Bilateral     2003, 2004    HX OTHER SURGICAL  09/06/2020    Kidney stones removed at [facility] in [place], [place]     Social History     Socioeconomic History    Marital status:    Occupational History    Occupation: Hopper - Part time     Comment: [email or web address]   Tobacco Use    Smoking status: Never    Smokeless tobacco: Never   Vaping Use    Vaping Use: Never used   Substance and Sexual Activity    Alcohol use: Yes     Comment: occasionally    Drug use: No     Family History   Problem Relation Age of Onset    Hypertension Mother     Hypertension Father     Diabetes Maternal Grandmother        No Known Allergies    Objective:  Visit Vitals  BP (!) 150/90   Pulse 95   Temp 98.4 °F (36.9 °C) (Temporal)   Resp 17   Ht 5' (1.524 m)   Wt 243 lb 3.2 oz (110.3 kg)   SpO2 98%   BMI 47.50 kg/m²     Physical Exam:   General appearance - alert, obese. Pleasant  Mental status - alert, oriented to person, place, and time  EYE-ZAINA, EOMI, corneas normal, no foreign bodies  Nose - normal and patent, no erythema, discharge or polyps  Mouth - mucous membranes moist, POOR dentition  Neck - supple, no significant adenopathy   Chest - clear to auscultation, no wheezes, rales or rhonchi, symmetric air entry   Heart - normal rate, regular rhythm, normal S1, S2  Abdomen - soft, nontender, nondistended, no masses or organomegaly  Ext-obese  Skin-Warm and dry.  no hyperpigmentation, vitiligo, or suspicious lesions  Neuro -alert, oriented, normal speech, walks w cane        Results for orders placed or performed in visit on 97/82/50   METABOLIC PANEL, COMPREHENSIVE   Result Value Ref Range    Glucose 86 65 - 99 mg/dL    BUN 13 8 - 27 mg/dL    Creatinine 0.77 0.57 - 1.00 mg/dL    eGFR 84 >59 mL/min/1.73    BUN/Creatinine ratio 17 12 - 28    Sodium 143 134 - 144 mmol/L    Potassium 3.9 3.5 - 5.2 mmol/L    Chloride 102 96 - 106 mmol/L    CO2 24 20 - 29 mmol/L    Calcium 10.0 8.7 - 10.3 mg/dL    Protein, total 6.4 6.0 - 8.5 g/dL    Albumin 4.2 3.8 - 4.8 g/dL    GLOBULIN, TOTAL 2.2 1.5 - 4.5 g/dL    A-G Ratio 1.9 1.2 - 2.2    Bilirubin, total <0.2 0.0 - 1.2 mg/dL    Alk. phosphatase 121 44 - 121 IU/L    AST (SGOT) 26 0 - 40 IU/L    ALT (SGPT) 23 0 - 32 IU/L   LIPID PANEL   Result Value Ref Range    Cholesterol, total 181 100 - 199 mg/dL    Triglyceride 60 0 - 149 mg/dL    HDL Cholesterol 92 >39 mg/dL    VLDL, calculated 12 5 - 40 mg/dL    LDL, calculated 77 0 - 99 mg/dL   CBC WITH AUTOMATED DIFF   Result Value Ref Range    WBC 7.5 3.4 - 10.8 x10E3/uL    RBC 5.26 3.77 - 5.28 x10E6/uL    HGB 13.5 11.1 - 15.9 g/dL    HCT 40.9 34.0 - 46.6 %    MCV 78 (L) 79 - 97 fL    MCH 25.7 (L) 26.6 - 33.0 pg    MCHC 33.0 31.5 - 35.7 g/dL    RDW 13.7 11.7 - 15.4 %    PLATELET 979 898 - 129 x10E3/uL    NEUTROPHILS 60 Not Estab. %    Lymphocytes 32 Not Estab. %    MONOCYTES 8 Not Estab. %    EOSINOPHILS 0 Not Estab. %    BASOPHILS 0 Not Estab. %    ABS. NEUTROPHILS 4.4 1.4 - 7.0 x10E3/uL    Abs Lymphocytes 2.4 0.7 - 3.1 x10E3/uL    ABS. MONOCYTES 0.6 0.1 - 0.9 x10E3/uL    ABS. EOSINOPHILS 0.0 0.0 - 0.4 x10E3/uL    ABS. BASOPHILS 0.0 0.0 - 0.2 x10E3/uL    IMMATURE GRANULOCYTES 0 Not Estab. %    ABS. IMM.  GRANS. 0.0 0.0 - 0.1 x10E3/uL   THYROID CASCADE PROFILE   Result Value Ref Range    TSH 0.925 0.450 - 4.500 uIU/mL   HEMOGLOBIN A1C WITH EAG   Result Value Ref Range    Hemoglobin A1c 5.9 (H) 4.8 - 5.6 %    Estimated average glucose 123 mg/dL   CVD REPORT   Result Value Ref Range    INTERPRETATION Note        Assessment/Plan: ICD-10-CM ICD-9-CM    1. Primary hypertension  I10 401.9 amLODIPine-benazepril (LOTREL) 5-20 mg per capsule      2. Bariatric surgery status  Z98.84 V45.86 REFERRAL TO WEIGHT LOSS      3. Morbid obesity (HCC)  E66.01 278.01 REFERRAL TO WEIGHT LOSS      4. Encounter for immunization  Z23 V03.89 INFLUENZA, FLUAD, (AGE 72 Y+), IM, PF, 0.5 ML          Orders Placed This Encounter    Influenza, FLUAD, (age 72 y+), IM, PF, 0.5 mL    REFERRAL TO WEIGHT LOSS     Referral Priority:   Routine     Referral Type:   Consultation     Referral Reason:   Specialty Services Required     Referred to Provider:   Mackenzie Guadalupe NP     Number of Visits Requested:   1    amLODIPine-benazepril (LOTREL) 5-20 mg per capsule     Sig: Take 1 Capsule by mouth daily. Dispense:  60 Capsule     Refill:  2     Takes the place of amlodipine 10mg     1. Primary hypertension  D/c amlodipine 10mg due to le edema  - amLODIPine-benazepril (LOTREL) 5-20 mg per capsule; Take 1 Capsule by mouth daily. Dispense: 60 Capsule; Refill: 2    2. Hyperlipidemia, unspecified hyperlipidemia type  Cont statin    3. PFO (patent foramen ovale)  Noted    4. Severe persistent asthma without complication  Quiescent    5. Morbid obesity ([address]    - REFERRAL TO WEIGHT LOSS    6. Bariatric surgery status    - REFERRAL TO WEIGHT LOSS    7. Chronic venous insufficiency  Noted    8. Generalized OA  Refer to PT    9. Encounter for Medicare annual wellness exam  Completed    10. Encounter for immunization  Administered  - INFLUENZA, FLUAD, (AGE 72 Y+), IM, PF, 0.5 ML      There are no Patient Instructions on file for this visit. Follow-up and Dispositions    Return in about 6 weeks (around 11/14/2022) for bp check on new med. I have reviewed with the patient details of the assessment and plan and all questions were answered. Relevent patient education was performed. The most recent lab findings were reviewed with the patient.     An After Visit Summary was printed and given to the patient. Yasmine Miller MD  This is the Subsequent Medicare Annual Wellness Exam, performed 12 months or more after the Initial AWV or the last Subsequent AWV    I have reviewed the patient's medical history in detail and updated the computerized patient record. Assessment/Plan   Education and counseling provided:  Are appropriate based on today's review and evaluation  Influenza Vaccine    1. Primary hypertension  -     amLODIPine-benazepril (LOTREL) 5-20 mg per capsule; Take 1 Capsule by mouth daily. , Normal, Disp-60 Capsule, R-2Takes the place of amlodipine 10mg  2. Bariatric surgery status  -     REFERRAL TO WEIGHT LOSS  3. Morbid obesity ([facility]  -     REFERRAL TO WEIGHT LOSS  4. Encounter for immunization  -     INFLUENZA, FLUAD, (AGE 72 Y+), IM, PF, 0.5 ML     Depression Risk Factor Screening     3 most recent PHQ Screens 10/3/2022   Little interest or pleasure in doing things Not at all   Feeling down, depressed, irritable, or hopeless Not at all   Total Score PHQ 2 0       Alcohol & Drug Abuse Risk Screen    Do you average more than 1 drink per night or more than 7 drinks a week:  No    On any one occasion in the past three months have you have had more than 3 drinks containing alcohol:  No          Functional Ability and Level of Safety    Hearing: Hearing is good. Activities of Daily Living: The home contains: no safety equipment. Patient does total self care      Ambulation: with difficulty, uses a cane     Fall Risk:  Fall Risk Assessment, last 12 mths 10/3/2022   Able to walk? Yes   Fall in past 12 months? 0   Do you feel unsteady?  0   Are you worried about falling 0      Abuse Screen:  Patient is not abused       Cognitive Screening    Has your family/caregiver stated any concerns about your memory: no         Health Maintenance Due     Health Maintenance Due   Topic Date Due    Hepatitis C Screening  Never done    Colorectal Cancer Screening Combo  Never done    Bone Densitometry (Dexa) Screening  Never done    Medicare Yearly Exam  Never done    Shingrix Vaccine Age 50> (2 of 2) 11/12/2019    Pneumococcal 65+ years (2 - PCV) 11/16/2021    DTaP/Tdap/Td series (2 - Td or Tdap) 03/28/2022    COVID-19 Vaccine (3 - Booster for Moderna series) 06/25/2022       Patient Care Team   Patient Care Team:  Seferino Del Toro MD as PCP - General (Internal Medicine Physician)  Seferino Del Toro MD as PCP - [address] [place] [address] Dr Rivas Provider    History     Patient Active Problem List   Diagnosis Code    Severe persistent asthma without complication R45.12    Generalized OA M15.9    Leg edema R60.0    History of transient ischemic attack (TIA) Z86.73    History of diabetes mellitus Z86.39    Morbid obesity ([facility] E66.01    Hyperlipidemia, unspecified hyperlipidemia type E78.5    Primary hypertension I10    PFO (patent foramen ovale) Q21.12    Bariatric surgery status Z98.84     Past Medical History:   Diagnosis Date    Asthma     Diabetes ([facility]     reports she is borderline diabetic     Hypertension     Kidney stone     Left ureteral stone     Renal colic on left side       Past Surgical History:   Procedure Laterality Date    HX GASTRIC BYPASS  2012    HX KNEE REPLACEMENT Bilateral     2003, 2004    HX OTHER SURGICAL  09/06/2020    Kidney stones removed at [facility] in [address]     Current Outpatient Medications   Medication Sig Dispense Refill    amLODIPine-benazepril (LOTREL) 5-20 mg per capsule Take 1 Capsule by mouth daily. 60 Capsule 2    cholecalciferol, vitamin D3, (Vitamin D3) 50 mcg (2,000 unit) tab Take 1 Tablet by mouth daily. 90 Tablet 5    atorvastatin (LIPITOR) 40 mg tablet Take 1 Tablet by mouth nightly. 90 Tablet 1    furosemide (LASIX) 20 mg tablet Take 20 mg by mouth daily. rivaroxaban (XARELTO) 20 mg tab tablet Take 1 Tablet by mouth daily (with dinner). 30 Tablet 1    aspirin 81 mg chewable tablet Take 81 mg by mouth daily.       ferrous sulfate 325 mg (65 mg iron) tablet Take 325 mg by mouth Daily (before breakfast). calcium citrate/vitamin D3 (CALCIUM CITRATE + D PO) Take 3 Tablets by mouth nightly. multivitamin, tx-iron-ca-min (THERA-M w/ IRON) 9 mg iron-400 mcg tab tablet Take 1 Tablet by mouth daily. azelastine (ASTELIN) 137 mcg (0.1 %) nasal spray 2 Sprays by Nasal route two (2) times a day. mepolizumab (Nucala) 100 mg/mL syrg injection 100 mg by SubCUTAneous route every thirty (30) days. cyanocobalamin (VITAMIN B-12) 1,000 mcg sublingual tablet Take 1,000 mcg by mouth daily. tiotropium bromide (SPIRIVA RESPIMAT) 1.25 mcg/actuation inhaler Take 2 Puffs by inhalation daily. fluticasone propionate (FLONASE) 50 mcg/actuation nasal spray 2 Sprays by Both Nostrils route daily as needed. fluticasone furoate-vilanteroL (BREO ELLIPTA) 200-25 mcg/dose inhaler Take 1 Puff by inhalation daily. montelukast (SINGULAIR) 10 mg tablet Take 1 Tab by mouth nightly. 30 Tab 2    albuterol (VENTOLIN HFA) 90 mcg/actuation inhaler Take 2 Puffs by inhalation every four (4) hours as needed for Wheezing.  1 Inhaler 0     No Known Allergies    Family History   Problem Relation Age of Onset    Hypertension Mother     Hypertension Father     Diabetes Maternal Grandmother      Social History     Tobacco Use    Smoking status: Never    Smokeless tobacco: Never   Substance Use Topics    Alcohol use: Yes     Comment: occasionally         Frieda Guy MD

## 2022-10-05 DIAGNOSIS — E66.01 MORBID OBESITY (HCC): ICD-10-CM

## 2022-10-05 DIAGNOSIS — Z76.89 ENCOUNTER FOR WEIGHT MANAGEMENT: Primary | ICD-10-CM

## 2022-10-06 NOTE — PROGRESS NOTES
Patient attended our VIRTUAL Medically Supervised Weight Loss New Patient Orientation on Wednesday October 5, 2022 where we discussed:  New Direction Very Low and the Low Calorie diet details  Medical Supervision  Nutrition education  Cost of Meal Replacements

## 2022-10-12 ENCOUNTER — OFFICE VISIT (OUTPATIENT)
Dept: SURGERY | Age: 67
End: 2022-10-12
Payer: MEDICARE

## 2022-10-12 VITALS
SYSTOLIC BLOOD PRESSURE: 141 MMHG | OXYGEN SATURATION: 97 % | HEIGHT: 60 IN | DIASTOLIC BLOOD PRESSURE: 93 MMHG | HEART RATE: 63 BPM | RESPIRATION RATE: 18 BRPM | TEMPERATURE: 98.4 F | WEIGHT: 239.8 LBS | BODY MASS INDEX: 47.08 KG/M2

## 2022-10-12 DIAGNOSIS — I10 PRIMARY HYPERTENSION: ICD-10-CM

## 2022-10-12 DIAGNOSIS — E66.01 MORBID OBESITY (HCC): Primary | ICD-10-CM

## 2022-10-12 DIAGNOSIS — E78.5 HYPERLIPIDEMIA, UNSPECIFIED HYPERLIPIDEMIA TYPE: ICD-10-CM

## 2022-10-12 PROCEDURE — G8755 DIAS BP > OR = 90: HCPCS | Performed by: INTERNAL MEDICINE

## 2022-10-12 PROCEDURE — 1090F PRES/ABSN URINE INCON ASSESS: CPT | Performed by: INTERNAL MEDICINE

## 2022-10-12 PROCEDURE — G9899 SCRN MAM PERF RSLTS DOC: HCPCS | Performed by: INTERNAL MEDICINE

## 2022-10-12 PROCEDURE — 3017F COLORECTAL CA SCREEN DOC REV: CPT | Performed by: INTERNAL MEDICINE

## 2022-10-12 PROCEDURE — G8427 DOCREV CUR MEDS BY ELIG CLIN: HCPCS | Performed by: INTERNAL MEDICINE

## 2022-10-12 PROCEDURE — G8432 DEP SCR NOT DOC, RNG: HCPCS | Performed by: INTERNAL MEDICINE

## 2022-10-12 PROCEDURE — G8753 SYS BP > OR = 140: HCPCS | Performed by: INTERNAL MEDICINE

## 2022-10-12 PROCEDURE — G8536 NO DOC ELDER MAL SCRN: HCPCS | Performed by: INTERNAL MEDICINE

## 2022-10-12 PROCEDURE — G8400 PT W/DXA NO RESULTS DOC: HCPCS | Performed by: INTERNAL MEDICINE

## 2022-10-12 PROCEDURE — G8417 CALC BMI ABV UP PARAM F/U: HCPCS | Performed by: INTERNAL MEDICINE

## 2022-10-12 PROCEDURE — 99203 OFFICE O/P NEW LOW 30 MIN: CPT | Performed by: INTERNAL MEDICINE

## 2022-10-12 PROCEDURE — 1101F PT FALLS ASSESS-DOCD LE1/YR: CPT | Performed by: INTERNAL MEDICINE

## 2022-10-12 NOTE — PROGRESS NOTES
HISTORY OF PRESENT ILLNESS  Melissa De Santiago is a 74 y.o. female. Patient was seen at the medical weight loss program.   PMH of bariatric surgery, knee pain, HTN and HLD. Patient reports that she would like to lose weight that she is not able to keep off. Had surgery for weight back in 2012. Has gained back about 60 lbs of that. Reports some knee pain and uses a cane most days. No falls. Sleeps about 6-7 hours a day. Has not gotten herself up to 4 16oz of water a day. Repots that she is not aware of food choices and the intake of each food she should have. Wants to be educated on food labels and her intake needed. Eats small meals daily, but often is fast food and/or going out. Eats a lot of fried chicken. Visit Vitals  BP (!) 141/93 (BP 1 Location: Right arm, BP Patient Position: Sitting, BP Cuff Size: Large adult)   Pulse 63   Temp 98.4 °F (36.9 °C) (Oral)   Resp 18   Ht 5' (1.524 m)   Wt 239 lb 12.8 oz (108.8 kg)   SpO2 97%   BMI 46.83 kg/m²     Past Medical History:   Diagnosis Date    Asthma     Diabetes ([facility]     reports she is borderline diabetic     Hypertension     Kidney stone     Left ureteral stone     Renal colic on left side     Stroke ([facility] [address] 10/10/2022     Past Surgical History:   Procedure Laterality Date    HX COLONOSCOPY  01/22/2019    HX GASTRIC BYPASS  2012    HX KNEE REPLACEMENT Bilateral     2003, 2004    HX OTHER SURGICAL  09/06/2020    Kidney stones removed at [facility] in [address] [place] [address]    HX TUBAL LIGATION       Family History   Problem Relation Age of Onset    Hypertension Mother     Obesity Mother     Hypertension Father     Diabetes Maternal Grandmother     Hypertension Maternal Grandmother      Outpatient Encounter Medications as of 10/12/2022   Medication Sig Dispense Refill    amLODIPine-benazepril (LOTREL) 5-20 mg per capsule Take 1 Capsule by mouth daily. 60 Capsule 2    cholecalciferol, vitamin D3, (Vitamin D3) 50 mcg (2,000 unit) tab Take 1 Tablet by mouth daily. 90 Tablet 5    atorvastatin (LIPITOR) 40 mg tablet Take 1 Tablet by mouth nightly. 90 Tablet 1    furosemide (LASIX) 20 mg tablet Take 20 mg by mouth daily. rivaroxaban (XARELTO) 20 mg tab tablet Take 1 Tablet by mouth daily (with dinner). 30 Tablet 1    aspirin 81 mg chewable tablet Take 81 mg by mouth daily. ferrous sulfate 325 mg (65 mg iron) tablet Take 325 mg by mouth Daily (before breakfast). calcium citrate/vitamin D3 (CALCIUM CITRATE + D PO) Take 3 Tablets by mouth nightly. multivitamin, tx-iron-ca-min (THERA-M w/ IRON) 9 mg iron-400 mcg tab tablet Take 1 Tablet by mouth daily. azelastine (ASTELIN) 137 mcg (0.1 %) nasal spray 2 Sprays by Nasal route two (2) times a day. mepolizumab (Nucala) 100 mg/mL syrg injection 100 mg by SubCUTAneous route every thirty (30) days. cyanocobalamin (VITAMIN B-12) 1,000 mcg sublingual tablet Take 1,000 mcg by mouth daily. tiotropium bromide (SPIRIVA RESPIMAT) 1.25 mcg/actuation inhaler Take 2 Puffs by inhalation daily. fluticasone propionate (FLONASE) 50 mcg/actuation nasal spray 2 Sprays by Both Nostrils route daily as needed. fluticasone furoate-vilanteroL (BREO ELLIPTA) 200-25 mcg/dose inhaler Take 1 Puff by inhalation daily. montelukast (SINGULAIR) 10 mg tablet Take 1 Tab by mouth nightly. 30 Tab 2    albuterol (VENTOLIN HFA) 90 mcg/actuation inhaler Take 2 Puffs by inhalation every four (4) hours as needed for Wheezing. 1 Inhaler 0     No facility-administered encounter medications on file as of 10/12/2022. HPI    Review of Systems   Constitutional: Negative. Respiratory: Negative. Cardiovascular: Negative. Gastrointestinal: Negative. Musculoskeletal:  Positive for joint pain. Neurological: Negative. Psychiatric/Behavioral: Negative. Physical Exam  Vitals and nursing note reviewed. Constitutional:       Appearance: She is obese.    Cardiovascular:      Rate and Rhythm: Normal rate and regular rhythm. Pulmonary:      Effort: Pulmonary effort is normal.      Breath sounds: Normal breath sounds. Abdominal:      General: Bowel sounds are normal.      Palpations: Abdomen is soft. Skin:     General: Skin is warm. Neurological:      Mental Status: She is alert and oriented to person, place, and time. Psychiatric:         Behavior: Behavior normal.       ASSESSMENT and PLAN  Diagnoses and all orders for this visit:    1. Morbid obesity ([address]      -   will start with the LCD. Reviewed this and will get more into with the dieticians      -   is to keep up the 72 of water and increase as tolerated. 2. Hyperlipidemia, unspecified hyperlipidemia type    3.  Primary hypertension      -   low sodium options     Follow-up and Dispositions    Return in about 1 month (around 11/12/2022), or if symptoms worsen or fail to improve.       lab results and schedule of future lab studies reviewed with patient  reviewed diet, exercise and weight control  reviewed medications and side effects in detail

## 2022-10-12 NOTE — PROGRESS NOTES
New Patient. Weight Management. 1. Have you been to the ER, urgent care clinic since your last visit? Hospitalized since your last visit? No    2. Have you seen or consulted any other health care providers outside of the [address] [place] [address] since your last visit? Include any pap smears or colon screening.  No    BMI - 46.6

## 2022-10-14 ENCOUNTER — CLINICAL SUPPORT (OUTPATIENT)
Dept: SURGERY | Age: 67
End: 2022-10-14

## 2022-10-14 DIAGNOSIS — E66.01 MORBID OBESITY (HCC): Primary | ICD-10-CM

## 2022-10-14 NOTE — PROGRESS NOTES
[facility]  Metabolic Program Initial Nutrition Consult    Date: 10/14/2022   Physician: Gisela Yates NP  Name: Nohelia Conde  :  1955    Type of Plan: LCD  Weeks on Plan:  hasn't started yet  Virtual visit was completed through [address] [place] [address]. ASSESSMENT:      Medications/Supplements:   Prior to Admission medications    Medication Sig Start Date End Date Taking? Authorizing Provider   amLODIPine-benazepril (LOTREL) 5-20 mg per capsule Take 1 Capsule by mouth daily. 10/3/22   Noa Mullins MD   cholecalciferol, vitamin D3, (Vitamin D3) 50 mcg (2,000 unit) tab Take 1 Tablet by mouth daily. 22   Noa Mullins MD   atorvastatin (LIPITOR) 40 mg tablet Take 1 Tablet by mouth nightly. 22   Noa Mullins MD   furosemide (LASIX) 20 mg tablet Take 20 mg by mouth daily. 3/2/22   Provider, Historical   rivaroxaban (XARELTO) 20 mg tab tablet Take 1 Tablet by mouth daily (with dinner). 10/30/21   Zena Colón MD   aspirin 81 mg chewable tablet Take 81 mg by mouth daily. Kalyani Mclaughlin MD   ferrous sulfate 325 mg (65 mg iron) tablet Take 325 mg by mouth Daily (before breakfast). Kalyani Mclaughlin MD   calcium citrate/vitamin D3 (CALCIUM CITRATE + D PO) Take 3 Tablets by mouth nightly. Kalyani Mclaughlin MD   multivitamin, tx-iron-ca-min (THERA-M w/ IRON) 9 mg iron-400 mcg tab tablet Take 1 Tablet by mouth daily. Kalyani Mclaughlin MD   azelastine (ASTELIN) 137 mcg (0.1 %) nasal spray 2 Sprays by Nasal route two (2) times a day. Provider, Historical   mepolizumab (Nucala) 100 mg/mL syrg injection 100 mg by SubCUTAneous route every thirty (30) days. Provider, Historical   cyanocobalamin (VITAMIN B-12) 1,000 mcg sublingual tablet Take 1,000 mcg by mouth daily. Provider, Historical   tiotropium bromide (SPIRIVA RESPIMAT) 1.25 mcg/actuation inhaler Take 2 Puffs by inhalation daily.     Provider, Historical   fluticasone propionate (FLONASE) 50 mcg/actuation nasal spray 2 Sprays by Both Nostrils route daily as needed. Other, MD Kalyani   fluticasone furoate-vilanteroL (BREO ELLIPTA) 200-25 mcg/dose inhaler Take 1 Puff by inhalation daily. Arnoldo, MD Kalyani   montelukast (SINGULAIR) 10 mg tablet Take 1 Tab by mouth nightly. 2/15/19   Elisabeth Alvarez MD   albuterol (VENTOLIN HFA) 90 mcg/actuation inhaler Take 2 Puffs by inhalation every four (4) hours as needed for Wheezing. 12/28/17   Nikki Morocho PA-C         Anthropometrics:    Ht:5'   Wt: 239#    IBW: 100#    %IBW: 239%     BMI:46    Category: Obesity Class 3    Pt presents today for initial nutrition consult for the [place].  Hx of Gastric bypass. Exercise/Physical Activity:not reported    Started meal replacements:not yet  If yes, how many per day: plans to start this weekend, 3 per day    Aversions/side effects to meal replacements:n/a    Reported Diet History: sporadic meal patterns, misses breakfast, will snack heavily on refined carbs, especially sweets nightly after dinner. Loves soda, up to 2 liters of Pepsi a day with little to no water. Likes most protein, no fish, and vegetables. Cooks mostly at home, chicken and vegetables, but may go out to eat occasionally    Pans to have morning ND shake, ND bar mid morning snack, lunch ND shake, then vegetable and protein dinner. Beverages: 2 L per day of pepsi. Weaned down to 5-6 ounces per day. Increasing water to three 16.9 ounce bottles of water per day. Coffee 2 cups 16 ounces x 2. 3 splenda and dollap. Environment/Psychosocial/Support:not reported    NUTRITION INTERVENTION:  Pt educated on nutrition recommendations for LCD, specifically 2 meal replacements every day plus a grocery meal and snack. Daily recommended totals: 1200 calories, 60 grams carbs, 80+ grams protein, and remaining calories as healthy fats. Use LCD handout for meal and snack suggestions and preparation.     Grocery meal: Use the balanced plate method to plan meals, include 3-6 oz of lean source of protein, unlimited non-starchy vegetables, 1/2 cup whole grains/beans OR 1/2 cup fruit OR 1 serving of low fat dairy. Utilize handouts listing healthy snack and meal ideas. Read all nutrition labels. Demonstrated and emphasized identifying serving size, total fat, sugar and protein content. Defined low fat as </= 3 g per serving. Discussed lean and extra lean sources of protein. Provided list of low fat cooking methods. Avoid foods with sugar listed in the first 3 ingredients and >/10 g sugar per serving. Practice mindful eating habits; take small bites, chew thoroughly, avoid distractions, utilize hunger/fullness scale. Attend Metabolic Weight Loss Class and Support Group and increase physical activity (approved per MD) for long term weight maintenance. NUTRITION MONITORING AND EVALUATION: Reviewed LCD guidelines, best tips, and safe use. Followup one month. The following goals were established with patient;  1) great job on soda reduction. Increase water to 60-80 ounces per day and can include plain water, sparkling water, decaf tea. 2) try ND shake with plain black coffee  3) start reducing splenda intake in coffee from 3 to 2 then down to eventually none    Specific tips and techniques to facilitate compliance with above recommendations were provided and discussed. If further details are desired please contact me at [phone]. This phone number was also provided to the patient for any further questions or concerns.           Mihir Dickey, MS, RD, LDN

## 2022-10-24 ENCOUNTER — CLINICAL SUPPORT (OUTPATIENT)
Dept: SURGERY | Age: 67
End: 2022-10-24

## 2022-10-24 VITALS
RESPIRATION RATE: 18 BRPM | OXYGEN SATURATION: 96 % | SYSTOLIC BLOOD PRESSURE: 127 MMHG | HEIGHT: 60 IN | BODY MASS INDEX: 46.35 KG/M2 | TEMPERATURE: 98 F | WEIGHT: 236.1 LBS | HEART RATE: 80 BPM | DIASTOLIC BLOOD PRESSURE: 88 MMHG

## 2022-10-24 DIAGNOSIS — E66.01 MORBID OBESITY (HCC): Primary | ICD-10-CM

## 2022-10-24 NOTE — PROGRESS NOTES
Weight Management. 1. Have you been to the ER, urgent care clinic since your last visit? Hospitalized since your last visit? No    2. Have you seen or consulted any other health care providers outside of the [address] [place] [address] since your last visit? Include any pap smears or colon screening. No      Progress Note: Weekly Education Class in the [facility] Loss [facility]         Patient is on Very Low Calorie Diet [x] (4 meal replacements per day, 800 kcal/day)      Low Calorie Diet [] (2-3 meal replacements per day, 7973-9456 kcal/day)    1) Did patient have any new symptoms or physical problems? Yes [x]    No []    If yes, check & comment: weakness [], fatigue [], lightheadedness [], headache [x], cramps [], cold intolerance [], hair loss [], diarrhea [], constipation [],  NA [] other:                                 2) Has patient had any medical attention from other providers, urgent care or the emergency room this week? Yes []  No [x]       NA [], If yes, why: a lot of dental  work                                      3) Any other sugar sweetened beverages consumed this week? Yes [x]  No []    4) Did patient have any problems adhering to the diet? Yes [x]  No [] NA []    If yes, Vacation [], Celebrations [x], Conferences [], Family Reunions [] other: just weak - family came by   With a lot of food                                              5) How many hours of sleep this week? 5.5-8    (range)  NA []    Number of meal replacements consumed daily? 2-4  (range)  NA []    Average ounces of water patient consumed daily this week (not including shakes)? 41     (divide the weekly total by 7)    Did you eat any food outside of the program? Yes [x] No []    Physical Activity Over the Past Week:    Cardio exercise: 0 min  Strength exercise: 0 workouts / week  Number of steps walked per day: 7145-2340    How has patient mood overall been this week?  Sad [], Happy [x], Stressed [], Tired [], Content [x], NA [], other            Medications reconciled by nurse Yes [x]  No[]    Patient was given therapeutic recommendations for any noted side effects of their dietary approach based upon [facility] patient manual per providers recommendation.

## 2022-11-09 ENCOUNTER — OFFICE VISIT (OUTPATIENT)
Dept: SURGERY | Age: 67
End: 2022-11-09
Payer: MEDICARE

## 2022-11-09 VITALS
HEIGHT: 60 IN | RESPIRATION RATE: 18 BRPM | SYSTOLIC BLOOD PRESSURE: 132 MMHG | DIASTOLIC BLOOD PRESSURE: 88 MMHG | WEIGHT: 231.5 LBS | HEART RATE: 66 BPM | BODY MASS INDEX: 45.45 KG/M2 | TEMPERATURE: 98.1 F | OXYGEN SATURATION: 96 %

## 2022-11-09 DIAGNOSIS — E66.01 MORBID OBESITY (HCC): Primary | ICD-10-CM

## 2022-11-09 DIAGNOSIS — E78.5 HYPERLIPIDEMIA, UNSPECIFIED HYPERLIPIDEMIA TYPE: ICD-10-CM

## 2022-11-09 DIAGNOSIS — I10 PRIMARY HYPERTENSION: ICD-10-CM

## 2022-11-09 PROCEDURE — G8417 CALC BMI ABV UP PARAM F/U: HCPCS | Performed by: INTERNAL MEDICINE

## 2022-11-09 PROCEDURE — 3017F COLORECTAL CA SCREEN DOC REV: CPT | Performed by: INTERNAL MEDICINE

## 2022-11-09 PROCEDURE — G8536 NO DOC ELDER MAL SCRN: HCPCS | Performed by: INTERNAL MEDICINE

## 2022-11-09 PROCEDURE — G8432 DEP SCR NOT DOC, RNG: HCPCS | Performed by: INTERNAL MEDICINE

## 2022-11-09 PROCEDURE — G8754 DIAS BP LESS 90: HCPCS | Performed by: INTERNAL MEDICINE

## 2022-11-09 PROCEDURE — 1101F PT FALLS ASSESS-DOCD LE1/YR: CPT | Performed by: INTERNAL MEDICINE

## 2022-11-09 PROCEDURE — G8752 SYS BP LESS 140: HCPCS | Performed by: INTERNAL MEDICINE

## 2022-11-09 PROCEDURE — G8427 DOCREV CUR MEDS BY ELIG CLIN: HCPCS | Performed by: INTERNAL MEDICINE

## 2022-11-09 PROCEDURE — 99214 OFFICE O/P EST MOD 30 MIN: CPT | Performed by: INTERNAL MEDICINE

## 2022-11-09 PROCEDURE — G8400 PT W/DXA NO RESULTS DOC: HCPCS | Performed by: INTERNAL MEDICINE

## 2022-11-09 PROCEDURE — 1090F PRES/ABSN URINE INCON ASSESS: CPT | Performed by: INTERNAL MEDICINE

## 2022-11-09 PROCEDURE — G9899 SCRN MAM PERF RSLTS DOC: HCPCS | Performed by: INTERNAL MEDICINE

## 2022-11-09 NOTE — PROGRESS NOTES
Week of Oct 24th    Progress Note: Weekly Education Class in the [facility]         Patient is on Very Low Calorie Diet [] (4 meal replacements per day, 800 kcal/day)      Low Calorie Diet [x] (2-3 meal replacements per day, 5771-6654 kcal/day)    1) Did patient have any new symptoms or physical problems? Yes [x]    No []    If yes, check & comment: weakness [], fatigue [], lightheadedness [], headache [], cramps [], cold intolerance [], hair loss [], diarrhea [], constipation [],  NA [] other:                                 2) Has patient had any medical attention from other providers, urgent care or the emergency room this week? Yes []  No [x]       NA [], If yes, why:                                       3) Any other sugar sweetened beverages consumed this week? Yes [x]  No []    4) Did patient have any problems adhering to the diet? Yes [x]  No [] NA []    If yes, Vacation [], Celebrations [], Conferences [], Family Reunions [] other:                                                5) How many hours of sleep this week? 4.5-7.5    (range)  NA []    Number of meal replacements consumed daily? 1-3 (range)  NA []    Average ounces of water patient consumed daily this week (not including shakes)? 28.5     (divide the weekly total by 7)    Did you eat any food outside of the program? Yes [x] No []    Physical Activity Over the Past Week:    Cardio exercise: 0 min  Strength exercise: 0 workouts / week  Number of steps walked per day: 3,015-5,560    How has patient mood overall been this week? Sad [], Happy [], Stressed [], Tired [x], Content [x], NA [], other            Medications reconciled by nurse Yes [x]  No[]    Patient was given therapeutic recommendations for any noted side effects of their dietary approach based upon [facility] patient manual per providers recommendation.        Week of 10/31    Progress Note: Weekly Education Class in the [facility] Program Patient is on Very Low Calorie Diet [] (4 meal replacements per day, 800 kcal/day)      Low Calorie Diet [x] (2-3 meal replacements per day, 2582-6556 kcal/day)    1) Did patient have any new symptoms or physical problems? Yes []    No [x]    If yes, check & comment: weakness [], fatigue [], lightheadedness [], headache [], cramps [], cold intolerance [], hair loss [], diarrhea [], constipation [],  NA [] other:                                 2) Has patient had any medical attention from other providers, urgent care or the emergency room this week? Yes []  No [x]       NA [], If yes, why:                                       3) Any other sugar sweetened beverages consumed this week? Yes [x]  No []    4) Did patient have any problems adhering to the diet? Yes [x]  No [] NA []    If yes, Vacation [], Celebrations [], Conferences [], Family Reunions [] other: No will power                                               5) How many hours of sleep this week? 4-7    (range)  NA []    Number of meal replacements consumed daily? 2-3 (range)  NA []    Average ounces of water patient consumed daily this week (not including shakes)? 37oz     (divide the weekly total by 7)    Did you eat any food outside of the program? Yes [x] No []    Physical Activity Over the Past Week:    Cardio exercise: 0 min  Strength exercise:  workouts / week  Number of steps walked per day: 2,330-6,037    How has patient mood overall been this week? Sad [], Happy [], Stressed [], Tired [x], Content [x], NA [], other            Medications reconciled by nurse Yes [x]  No[]    Patient was given therapeutic recommendations for any noted side effects of their dietary approach based upon [facility] patient manual per providers recommendation.

## 2022-11-15 ENCOUNTER — CLINICAL SUPPORT (OUTPATIENT)
Dept: SURGERY | Age: 67
End: 2022-11-15

## 2022-11-15 DIAGNOSIS — E66.01 MORBID OBESITY (HCC): Primary | ICD-10-CM

## 2022-11-15 NOTE — PROGRESS NOTES
[facility]  Metabolic Program Follow-up Nutrition Consult    Date: 11/15/2022   Physician: [facility], NP  Name: Melissa De Santiago  :  1955    Type of Plan: LCD  Weeks on Plan: 1 month  Virtual visit was completed through [address] [place] [address]. ASSESSMENT:    Medications/Supplements:   Prior to Admission medications    Medication Sig Start Date End Date Taking? Authorizing Provider   amLODIPine-benazepril (LOTREL) 5-20 mg per capsule Take 1 Capsule by mouth daily. 10/3/22   Lilliam Whitaker MD   cholecalciferol, vitamin D3, (Vitamin D3) 50 mcg (2,000 unit) tab Take 1 Tablet by mouth daily. 22   Lilliam Whitaker MD   atorvastatin (LIPITOR) 40 mg tablet Take 1 Tablet by mouth nightly. 22   Lilliam Whitaker MD   furosemide (LASIX) 20 mg tablet Take 20 mg by mouth daily. 3/2/22   Provider, Historical   rivaroxaban (XARELTO) 20 mg tab tablet Take 1 Tablet by mouth daily (with dinner). 10/30/21   Elliot Colón MD   aspirin 81 mg chewable tablet Take 81 mg by mouth daily. Kalyani Mclaughlin MD   ferrous sulfate 325 mg (65 mg iron) tablet Take 325 mg by mouth Daily (before breakfast). Kalyani Mclaughlin MD   calcium citrate/vitamin D3 (CALCIUM CITRATE + D PO) Take 3 Tablets by mouth nightly. Kalyani Mclaughlin MD   multivitamin, tx-iron-ca-min (THERA-M w/ IRON) 9 mg iron-400 mcg tab tablet Take 1 Tablet by mouth daily. Kalyani Mclaughlin MD   azelastine (ASTELIN) 137 mcg (0.1 %) nasal spray 2 Sprays by Nasal route two (2) times a day. Provider, Historical   mepolizumab (Nucala) 100 mg/mL syrg injection 100 mg by SubCUTAneous route every thirty (30) days. Provider, Historical   cyanocobalamin (VITAMIN B-12) 1,000 mcg sublingual tablet Take 1,000 mcg by mouth daily. Provider, Historical   tiotropium bromide (SPIRIVA RESPIMAT) 1.25 mcg/actuation inhaler Take 2 Puffs by inhalation daily.     Provider, Historical   fluticasone propionate (FLONASE) 50 mcg/actuation nasal spray 2 Sprays by Both Nostrils route daily as needed. Other, MD Kalyani   fluticasone furoate-vilanteroL (BREO ELLIPTA) 200-25 mcg/dose inhaler Take 1 Puff by inhalation daily. Arnoldo, MD Kalyani   montelukast (SINGULAIR) 10 mg tablet Take 1 Tab by mouth nightly. 2/15/19   Armando Sharma MD   albuterol (VENTOLIN HFA) 90 mcg/actuation inhaler Take 2 Puffs by inhalation every four (4) hours as needed for Wheezing. 12/28/17   Dick Delacruz PA-C              Starting Weight: 239#   Current Weight: 231#  Overall Pounds Lost: 8#  Overall Pounds Gained: 0  Goal: has a bus in her senior community to take her places and wants to be able to get up on the bus steps without pain or stopping after each step. Exercise/Physical Activity: walking daily for few minutes at a time. Is patient using [employer] products:yes  If yes, how many per day: 2    Aversions/side effects of product/program: none    Fluids used to mix with products:water    Reported Diet History: 2 ND products per day and one meal.  Used to snack heavily on food she kept for grandchild. Dinners: air fryer more often. Protein and vegetable. Beverages: 1 little 7.5 oz can of pepsi a day or no pepsi per day. Most days 3 16.9 ounce bottles, sometimes up to 4 nunez per day. 24 Hour Diet Recall  Breakfast  ND shake   Lunch  ND shake   Dinner  Pork chop, corn, cabbage   Snacks  [place]; after dinner popcorn and pepsi   Beverages  water     Barriers/concerns preventing patient from achieving goal(s) since last visit: Feels like she needs dessert after each meal.   Lives alone. No longer keeping foods in home for grandchild. NUTRITION INTERVENTION:  Pt educated on nutrition recommendations for the [employer] Low Calorie Plan (LCD), with the specific meal pattern of 2 meal replacements every day plus a grocery meal and snack. Daily recommended totals: 1200 calories, 60 grams carbs, 80+ grams protein, and remaining calories as healthy fats. Use LCD handout for meal and snack suggestions and preparation. Grocery meal:  Use the balanced plate method to plan meals, include 3-6 oz of lean source of protein, unlimited non-starchy vegetables, 1/2 cup whole grains/beans OR 1/2 cup fruit OR 1 serving of low fat dairy. Utilize handouts listing healthy snack and meal ideas. Read all nutrition labels. Demonstrated and emphasized identifying serving size, total fat, sugar and protein content. Defined low fat as </= 3 g per serving. Discussed lean and extra lean sources of protein. Avoid foods with sugar listed in the first 3 ingredients and >/10 g sugar per serving. Consume meal replacements every three to four hours or pattern as discussed with provider. Mix with water. May add herbs/spices for taste. Practice mindful eating habits; take small bites, chew thoroughly, avoid distractions, utilize hunger/fullness scale. Reviewed attendance policy of attending weekly nutrition classes. Metabolic support group recommended, and increase physical activity (approved per MD) for long term weight maintenance. NUTRITION MONITORING AND EVALUATION: 8# x 1 month. Meeting nutrition goals with less soda, more water, no skipping, and removing trigger foods from the home. No changes in nutrition plan at this time. Pt remains motivated, adherence likely. Followup one month. Specific tips and techniques to facilitate compliance with above recommendations were provided and discussed. If further details are desired please contact me at [phone]. This phone number was also provided to the patient for any further questions or concerns.           Zoraida Gibbons, MS, RD, LDN

## 2022-11-17 ENCOUNTER — OFFICE VISIT (OUTPATIENT)
Dept: SURGERY | Age: 67
End: 2022-11-17

## 2022-11-17 DIAGNOSIS — E66.01 MORBID OBESITY (HCC): Primary | ICD-10-CM

## 2022-11-18 ENCOUNTER — TELEPHONE (OUTPATIENT)
Dept: INTERNAL MEDICINE CLINIC | Age: 67
End: 2022-11-18

## 2022-11-18 ENCOUNTER — OFFICE VISIT (OUTPATIENT)
Dept: INTERNAL MEDICINE CLINIC | Age: 67
End: 2022-11-18
Payer: MEDICARE

## 2022-11-18 VITALS
RESPIRATION RATE: 18 BRPM | OXYGEN SATURATION: 99 % | BODY MASS INDEX: 45.55 KG/M2 | HEIGHT: 60 IN | DIASTOLIC BLOOD PRESSURE: 75 MMHG | HEART RATE: 73 BPM | WEIGHT: 232 LBS | TEMPERATURE: 97.8 F | SYSTOLIC BLOOD PRESSURE: 116 MMHG

## 2022-11-18 DIAGNOSIS — I10 PRIMARY HYPERTENSION: Primary | ICD-10-CM

## 2022-11-18 DIAGNOSIS — I87.2 CHRONIC VENOUS INSUFFICIENCY: ICD-10-CM

## 2022-11-18 DIAGNOSIS — J45.50 SEVERE PERSISTENT ASTHMA WITHOUT COMPLICATION: ICD-10-CM

## 2022-11-18 DIAGNOSIS — M15.9 GENERALIZED OA: ICD-10-CM

## 2022-11-18 DIAGNOSIS — R60.0 LEG EDEMA: ICD-10-CM

## 2022-11-18 PROCEDURE — 99214 OFFICE O/P EST MOD 30 MIN: CPT | Performed by: INTERNAL MEDICINE

## 2022-11-18 PROCEDURE — G8536 NO DOC ELDER MAL SCRN: HCPCS | Performed by: INTERNAL MEDICINE

## 2022-11-18 PROCEDURE — G8417 CALC BMI ABV UP PARAM F/U: HCPCS | Performed by: INTERNAL MEDICINE

## 2022-11-18 PROCEDURE — G8510 SCR DEP NEG, NO PLAN REQD: HCPCS | Performed by: INTERNAL MEDICINE

## 2022-11-18 PROCEDURE — 1090F PRES/ABSN URINE INCON ASSESS: CPT | Performed by: INTERNAL MEDICINE

## 2022-11-18 PROCEDURE — G8752 SYS BP LESS 140: HCPCS | Performed by: INTERNAL MEDICINE

## 2022-11-18 PROCEDURE — G8754 DIAS BP LESS 90: HCPCS | Performed by: INTERNAL MEDICINE

## 2022-11-18 PROCEDURE — 1101F PT FALLS ASSESS-DOCD LE1/YR: CPT | Performed by: INTERNAL MEDICINE

## 2022-11-18 PROCEDURE — G8400 PT W/DXA NO RESULTS DOC: HCPCS | Performed by: INTERNAL MEDICINE

## 2022-11-18 PROCEDURE — G9899 SCRN MAM PERF RSLTS DOC: HCPCS | Performed by: INTERNAL MEDICINE

## 2022-11-18 PROCEDURE — 3017F COLORECTAL CA SCREEN DOC REV: CPT | Performed by: INTERNAL MEDICINE

## 2022-11-18 PROCEDURE — G8427 DOCREV CUR MEDS BY ELIG CLIN: HCPCS | Performed by: INTERNAL MEDICINE

## 2022-11-18 NOTE — TELEPHONE ENCOUNTER
Pt called to give you the name of her pcp  Dr. Elkin Watts in [place] and her phone number is  [phone]  Pt # [phone]

## 2022-11-18 NOTE — PROGRESS NOTES
Efra Horta is a 69 y.o. female and presents with Blood Pressure Check (On amlodipine-benzepril) and Medication Evaluation (Pt has question about VIT D3 medication)    . Subjective:    (mother is Angel Rolly)  Moved from [facility] 2/5/2022    Pt w c/o katharine le edema chronically. Pt s/p duplex katharine le ordered by cardiology. Negative for DVT. Pts amlodipine 10mg changed to lotrel 5/20, in hopes of decreasing le edema as a SE from amlodipine. To no avail    PFO-no surgical closure indicated as per cards    Pts katharine le weakness since TIA x 2 has improved post PT    Chronic le pain. Told she has lumbar DJD (vs DDD)    Pt is enrolled in [employer] weight loss program.      PMH- HTN  BP Readings from Last 3 Encounters:   11/18/22 116/75   11/09/22 132/88   10/24/22 127/88        Asthma- Dr. Phillip Denton of [place]     HLD-on atorvastatin 40mg  Lab Results   Component Value Date/Time    Cholesterol, total 181 03/23/2022 02:39 PM    HDL Cholesterol 92 03/23/2022 02:39 PM    LDL, calculated 77 03/23/2022 02:39 PM    LDL, calculated 41 10/28/2021 04:34 AM    VLDL, calculated 12 03/23/2022 02:39 PM    Triglyceride 60 03/23/2022 02:39 PM    CHOL/HDL Ratio 1.5 10/28/2021 04:34 AM      OA   Osteoporosis   PFO on chronic AC   H/o TIA   H/o kidney stones     PSH- katharine TKR   Gastric bypass    SH-   Lives alone   2 daughters and 4 grandchildren    FH- 6 siblings    HM  mammo- UTD  Colonoscopy- 1/22/2019  Immunizations  Eye care ~ 2 yrs ago  Dental care  BMD      Review of Systems  Constitutional: negative for fevers, chills, anorexia and weight loss  Eyes:   negative for visual disturbance and irritation  ENT:   negative for tinnitus,sore throat,nasal congestion,ear pains. hoarseness  Respiratory:  negative for cough, hemoptysis, dyspnea,wheezing  CV:   negative for chest pain, palpitations, lower extremity edema  GI:   negative for nausea, vomiting, diarrhea, abdominal pain,melena  Musculoskel: positive for myalgias, arthralgias, back pain, muscle weakness, joint pain  Neurological:  negative for headaches, dizziness, vertigo, memory problems and gait   Behavl/Psych: negative for feelings of anxiety, depression, mood changes    Past Medical History:   Diagnosis Date    Asthma     Diabetes ([facility]     reports she is borderline diabetic     Hypertension     Kidney stone     Left ureteral stone     Renal colic on left side     Stroke ([facility] 10/10/2022     Past Surgical History:   Procedure Laterality Date    HX COLONOSCOPY  01/22/2019    HX GASTRIC BYPASS  2012    HX KNEE REPLACEMENT Bilateral     2003, 2004    HX OTHER SURGICAL  09/06/2020    Kidney stones removed at [facility] in [place], [place]    HX [address] History     Socioeconomic History    Marital status:    Occupational History    Occupation:  - Part time     Comment: [employer]   Tobacco Use    Smoking status: Never    Smokeless tobacco: Never   Vaping Use    Vaping Use: Never used   Substance and Sexual Activity    Alcohol use: Yes     Comment: occasionally    Drug use: No     Family History   Problem Relation Age of Onset    Hypertension Mother     Obesity Mother     Hypertension Father     Diabetes Maternal Grandmother     Hypertension Maternal Grandmother        No Known Allergies    Objective:  Visit Vitals  /75 (BP 1 Location: Left upper arm, BP Patient Position: Sitting, BP Cuff Size: Large adult)   Pulse 73   Temp 97.8 °F (36.6 °C) (Temporal)   Resp 18   Ht 5' (1.524 m)   Wt 232 lb (105.2 kg)   SpO2 99%   BMI 45.31 kg/m²       Physical Exam:   General appearance - alert, obese.  Pleasant  Mental status - alert, oriented to person, place, and time  EYE-EOMI  Mouth - mucous membranes moist  Neck - supple, no significant adenopathy   Chest - clear to auscultation, no wheezes, rales or rhonchi, symmetric air entry   Heart - normal rate, regular rhythm, normal S1, S2  Abdomen - soft, nontender, nondistended, no masses or organomegaly  Ext-obese  Skin-Warm and dry. no hyperpigmentation, vitiligo, or suspicious lesions  Neuro -alert, oriented, normal speech, walks w cane        Results for orders placed or performed in visit on 17/18/85   METABOLIC PANEL, COMPREHENSIVE   Result Value Ref Range    Glucose 86 65 - 99 mg/dL    BUN 13 8 - 27 mg/dL    Creatinine 0.77 0.57 - 1.00 mg/dL    eGFR 84 >59 mL/min/1.73    BUN/Creatinine ratio 17 12 - 28    Sodium 143 134 - 144 mmol/L    Potassium 3.9 3.5 - 5.2 mmol/L    Chloride 102 96 - 106 mmol/L    CO2 24 20 - 29 mmol/L    Calcium 10.0 8.7 - 10.3 mg/dL    Protein, total 6.4 6.0 - 8.5 g/dL    Albumin 4.2 3.8 - 4.8 g/dL    GLOBULIN, TOTAL 2.2 1.5 - 4.5 g/dL    A-G Ratio 1.9 1.2 - 2.2    Bilirubin, total <0.2 0.0 - 1.2 mg/dL    Alk. phosphatase 121 44 - 121 IU/L    AST (SGOT) 26 0 - 40 IU/L    ALT (SGPT) 23 0 - 32 IU/L   LIPID PANEL   Result Value Ref Range    Cholesterol, total 181 100 - 199 mg/dL    Triglyceride 60 0 - 149 mg/dL    HDL Cholesterol 92 >39 mg/dL    VLDL, calculated 12 5 - 40 mg/dL    LDL, calculated 77 0 - 99 mg/dL   CBC WITH AUTOMATED DIFF   Result Value Ref Range    WBC 7.5 3.4 - 10.8 x10E3/uL    RBC 5.26 3.77 - 5.28 x10E6/uL    HGB 13.5 11.1 - 15.9 g/dL    HCT 40.9 34.0 - 46.6 %    MCV 78 (L) 79 - 97 fL    MCH 25.7 (L) 26.6 - 33.0 pg    MCHC 33.0 31.5 - 35.7 g/dL    RDW 13.7 11.7 - 15.4 %    PLATELET 747 420 - 394 x10E3/uL    NEUTROPHILS 60 Not Estab. %    Lymphocytes 32 Not Estab. %    MONOCYTES 8 Not Estab. %    EOSINOPHILS 0 Not Estab. %    BASOPHILS 0 Not Estab. %    ABS. NEUTROPHILS 4.4 1.4 - 7.0 x10E3/uL    Abs Lymphocytes 2.4 0.7 - 3.1 x10E3/uL    ABS. MONOCYTES 0.6 0.1 - 0.9 x10E3/uL    ABS. EOSINOPHILS 0.0 0.0 - 0.4 x10E3/uL    ABS. BASOPHILS 0.0 0.0 - 0.2 x10E3/uL    IMMATURE GRANULOCYTES 0 Not Estab. %    ABS. IMM.  GRANS. 0.0 0.0 - 0.1 x10E3/uL   THYROID CASCADE PROFILE   Result Value Ref Range    TSH 0.925 0.450 - 4.500 uIU/mL   HEMOGLOBIN A1C WITH EAG Result Value Ref Range    Hemoglobin A1c 5.9 (H) 4.8 - 5.6 %    Estimated average glucose 123 mg/dL   CVD REPORT   Result Value Ref Range    INTERPRETATION Note        Assessment/Plan:    ICD-10-CM ICD-9-CM    1. Primary hypertension  I10 401.9       2. Chronic venous insufficiency  I87.2 459.81       3. Leg edema  R60.0 782.3       4. Generalized OA  M15.9 715.00       5. Severe persistent asthma without complication  Q27.69 409.26             No orders of the defined types were placed in this encounter. 1. Primary hypertension  Controlled    2. Chronic venous insufficiency  Encourage daily activity    3. Leg edema  As above    4. Generalized OA  Noted    5. Severe persistent asthma without complication  Quiescent      There are no Patient Instructions on file for this visit. Follow-up and Dispositions    Return in about 3 months (around 2/18/2023) for bp. I have reviewed with the patient details of the assessment and plan and all questions were answered. Relevent patient education was performed. The most recent lab findings were reviewed with the patient. An After Visit Summary was printed and given to the patient.       Rubi Leach MD

## 2022-11-18 NOTE — PROGRESS NOTES
Chief Complaint   Patient presents with    Blood Pressure Check     On amlodipine-benzepril    Medication Evaluation     Pt has question about VIT D3 medication       1. \"Have you been to the ER, urgent care clinic since your last visit? Hospitalized since your last visit? \" No    2. \"Have you seen or consulted any other health care providers outside of the [address] [place] [address] since your last visit? \" No     3. For patients aged 39-70: Has the patient had a colonoscopy / FIT/ Cologuard? No      If the patient is female:    4. For patients aged 41-77: Has the patient had a mammogram within the past 2 years? Yes - no Care Gap present      5. For patients aged 21-65: Has the patient had a pap smear?  NA - based on age or sex

## 2022-11-21 ENCOUNTER — CLINICAL SUPPORT (OUTPATIENT)
Dept: SURGERY | Age: 67
End: 2022-11-21

## 2022-11-21 VITALS
DIASTOLIC BLOOD PRESSURE: 85 MMHG | WEIGHT: 232.2 LBS | OXYGEN SATURATION: 95 % | HEART RATE: 64 BPM | RESPIRATION RATE: 18 BRPM | HEIGHT: 60 IN | TEMPERATURE: 97.9 F | BODY MASS INDEX: 45.59 KG/M2 | SYSTOLIC BLOOD PRESSURE: 131 MMHG

## 2022-11-21 DIAGNOSIS — E66.01 MORBID OBESITY (HCC): Primary | ICD-10-CM

## 2022-11-21 NOTE — PROGRESS NOTES
Weight Management. 1. Have you been to the ER, urgent care clinic since your last visit? Hospitalized since your last visit? No    2. Have you seen or consulted any other health care providers outside of the [address] [place] [address] since your last visit? Include any pap smears or colon screening. No      Progress Note: Weekly Education Class in the [facility] Weight Loss Program         Patient is on Very Low Calorie Diet [] (4 meal replacements per day, 800 kcal/day)      Low Calorie Diet [x] (2-3 meal replacements per day, 2636-1653 kcal/day)    1) Did patient have any new symptoms or physical problems? Yes []    No [x]    If yes, check & comment: weakness [], fatigue [], lightheadedness [], headache [], cramps [], cold intolerance [], hair loss [], diarrhea [], constipation [],  NA [] other:                                 2) Has patient had any medical attention from other providers, urgent care or the emergency room this week? Yes [x]  No []       NA [], If yes, why: BP follow-up with PCP                                      3) Any other sugar sweetened beverages consumed this week? Yes [x]  No []    4) Did patient have any problems adhering to the diet? Yes [x]  No [] NA []    If yes, Vacation [], Celebrations [], Conferences [], Family Reunions [] other: just snacking a lot                                               5) How many hours of sleep this week? 3.5-7    (range)  NA []    Number of meal replacements consumed daily? 1-4  (range)  NA []    Average ounces of water patient consumed daily this week (not including shakes)? 28     (divide the weekly total by 7)    Did you eat any food outside of the program? Yes [x] No []    Physical Activity Over the Past Week:    Cardio exercise: 0 min  Strength exercise: 0 workouts / week  Number of steps walked per day: 7798-2379    How has patient mood overall been this week?  Sad [], Happy [], Stressed [], Tired [x], Content [x], NA [], other Medications reconciled by nurse Yes [x]  No[]    Patient was given therapeutic recommendations for any noted side effects of their dietary approach based upon [employer] patient manual per providers recommendation.

## 2022-12-01 NOTE — PROGRESS NOTES
[facility]  Metabolic Weight Loss Program        Patient's Name: Linda Upton  : 1955    This patient is enrolled in [address] [place] [address] Weight Loss Program and attended the required weekly virtual nutrition class hosted via [address] [place] [address] today.       Alice Schneider, MS, RD, LDN

## 2022-12-14 ENCOUNTER — OFFICE VISIT (OUTPATIENT)
Dept: SURGERY | Age: 67
End: 2022-12-14
Payer: MEDICARE

## 2022-12-14 ENCOUNTER — OFFICE VISIT (OUTPATIENT)
Dept: SURGERY | Age: 67
End: 2022-12-14

## 2022-12-14 VITALS
TEMPERATURE: 98.2 F | DIASTOLIC BLOOD PRESSURE: 87 MMHG | RESPIRATION RATE: 18 BRPM | OXYGEN SATURATION: 95 % | WEIGHT: 229.9 LBS | HEIGHT: 60 IN | HEART RATE: 67 BPM | BODY MASS INDEX: 45.14 KG/M2 | SYSTOLIC BLOOD PRESSURE: 139 MMHG

## 2022-12-14 DIAGNOSIS — E66.01 MORBID OBESITY (HCC): Primary | ICD-10-CM

## 2022-12-14 PROCEDURE — G8427 DOCREV CUR MEDS BY ELIG CLIN: HCPCS | Performed by: INTERNAL MEDICINE

## 2022-12-14 PROCEDURE — G8536 NO DOC ELDER MAL SCRN: HCPCS | Performed by: INTERNAL MEDICINE

## 2022-12-14 PROCEDURE — G8754 DIAS BP LESS 90: HCPCS | Performed by: INTERNAL MEDICINE

## 2022-12-14 PROCEDURE — G8400 PT W/DXA NO RESULTS DOC: HCPCS | Performed by: INTERNAL MEDICINE

## 2022-12-14 PROCEDURE — G8752 SYS BP LESS 140: HCPCS | Performed by: INTERNAL MEDICINE

## 2022-12-14 PROCEDURE — 1090F PRES/ABSN URINE INCON ASSESS: CPT | Performed by: INTERNAL MEDICINE

## 2022-12-14 PROCEDURE — 3017F COLORECTAL CA SCREEN DOC REV: CPT | Performed by: INTERNAL MEDICINE

## 2022-12-14 PROCEDURE — G8432 DEP SCR NOT DOC, RNG: HCPCS | Performed by: INTERNAL MEDICINE

## 2022-12-14 PROCEDURE — G9899 SCRN MAM PERF RSLTS DOC: HCPCS | Performed by: INTERNAL MEDICINE

## 2022-12-14 PROCEDURE — 99214 OFFICE O/P EST MOD 30 MIN: CPT | Performed by: INTERNAL MEDICINE

## 2022-12-14 PROCEDURE — 1101F PT FALLS ASSESS-DOCD LE1/YR: CPT | Performed by: INTERNAL MEDICINE

## 2022-12-14 PROCEDURE — G8417 CALC BMI ABV UP PARAM F/U: HCPCS | Performed by: INTERNAL MEDICINE

## 2022-12-14 RX ORDER — PREDNISONE 10 MG/1
TABLET ORAL
COMMUNITY
Start: 2022-12-13 | End: 2022-12-19

## 2022-12-14 RX ORDER — BENZONATATE 100 MG/1
CAPSULE ORAL
COMMUNITY
Start: 2022-12-09

## 2022-12-14 RX ORDER — CODEINE PHOSPHATE AND GUAIFENESIN 10; 100 MG/5ML; MG/5ML
5 SOLUTION ORAL
COMMUNITY
Start: 2022-12-13

## 2022-12-14 NOTE — PROGRESS NOTES
[address] [facility] [address] [facility] [address]  Linda Upton is a 69 y.o. female. Patient was seen for a follow up at the medical weight clinic. Is an AA. Patient currently is not feeling well and being treated for an URI/asthma excerebration. Is starting on PO steroids and nebulizer. Is with a cough and SOB. Diet has not been well as she has been eating whatever right now due to being sick. No fever or CP. Did go out of town for the holidays and got a lot of walking in. Water could improve. She is sleeping a lot more right now. Still having trouble with why she eats more than needed and food choices. Is down another 3 lbs since last check. Today is 229.   Visit Vitals  /87 (BP 1 Location: Right arm, BP Patient Position: Sitting, BP Cuff Size: Large adult)   Pulse 67   Temp 98.2 °F (36.8 °C)   Resp 18   Ht 5' (1.524 m)   Wt 229 lb 14.4 oz (104.3 kg)   SpO2 95%   BMI 44.90 kg/m²     Past Medical History:   Diagnosis Date    Asthma     Diabetes ([address]     reports she is borderline diabetic     Hypertension     Kidney stone     Left ureteral stone     Renal colic on left side     Stroke ([address] 10/10/2022     Past Surgical History:   Procedure Laterality Date    HX COLONOSCOPY  01/22/2019    HX GASTRIC BYPASS  2012    HX KNEE REPLACEMENT Bilateral     2003, 2004    HX OTHER SURGICAL  09/06/2020    Kidney stones removed at [facility] in [address] [place] [address] TUBAL LIGATION       Family History   Problem Relation Age of Onset    Hypertension Mother     Obesity Mother     Hypertension Father     Diabetes Maternal Grandmother     Hypertension Maternal Grandmother      Outpatient Encounter Medications as of 12/14/2022   Medication Sig Dispense Refill    predniSONE (DELTASONE) 10 mg tablet Taper as directed      guaiFENesin-codeine (ROBITUSSIN AC) 100-10 mg/5 mL solution Take 5 mL by mouth three (3) times daily as needed for Cough.  benzonatate (TESSALON) 100 mg capsule TAKE 1 CAPSULE BY MOUTH THREE TIMES DAILY AS NEEDED FOR COUGH      amLODIPine-benazepril (LOTREL) 5-20 mg per capsule Take 1 Capsule by mouth daily. 60 Capsule 2    cholecalciferol, vitamin D3, (Vitamin D3) 50 mcg (2,000 unit) tab Take 1 Tablet by mouth daily. 90 Tablet 5    atorvastatin (LIPITOR) 40 mg tablet Take 1 Tablet by mouth nightly. 90 Tablet 1    furosemide (LASIX) 20 mg tablet Take 20 mg by mouth daily.  rivaroxaban (XARELTO) 20 mg tab tablet Take 1 Tablet by mouth daily (with dinner). 30 Tablet 1    aspirin 81 mg chewable tablet Take 81 mg by mouth daily.  ferrous sulfate 325 mg (65 mg iron) tablet Take 325 mg by mouth Daily (before breakfast).  calcium citrate/vitamin D3 (CALCIUM CITRATE + D PO) Take 3 Tablets by mouth nightly.  multivitamin, tx-iron-ca-min (THERA-M w/ IRON) 9 mg iron-400 mcg tab tablet Take 1 Tablet by mouth daily.  azelastine (ASTELIN) 137 mcg (0.1 %) nasal spray 2 Sprays by Nasal route two (2) times a day.  mepolizumab (Nucala) 100 mg/mL syrg injection 100 mg by SubCUTAneous route every thirty (30) days.  cyanocobalamin (VITAMIN B-12) 1,000 mcg sublingual tablet Take 1,000 mcg by mouth daily.  tiotropium bromide (SPIRIVA RESPIMAT) 1.25 mcg/actuation inhaler Take 2 Puffs by inhalation daily.  fluticasone propionate (FLONASE) 50 mcg/actuation nasal spray 2 Sprays by Both Nostrils route daily as needed.  fluticasone furoate-vilanteroL (BREO ELLIPTA) 200-25 mcg/dose inhaler Take 1 Puff by inhalation daily.  montelukast (SINGULAIR) 10 mg tablet Take 1 Tab by mouth nightly. 30 Tab 2    albuterol (VENTOLIN HFA) 90 mcg/actuation inhaler Take 2 Puffs by inhalation every four (4) hours as needed for Wheezing. 1 Inhaler 0     No facility-administered encounter medications on file as of 12/14/2022. HPI    Review of Systems   Constitutional: Negative.     HENT:  Positive for congestion. Respiratory:  Positive for cough and shortness of breath. Cardiovascular: Negative. Gastrointestinal: Negative. Musculoskeletal:  Positive for joint pain. Neurological:  Negative for dizziness and headaches. Physical Exam  Vitals and nursing note reviewed. HENT:      Nose: Congestion present. Mouth/Throat:      Pharynx: Oropharynx is clear. Cardiovascular:      Rate and Rhythm: Normal rate and regular rhythm. Pulmonary:      Breath sounds: Wheezing present. Comments: Uncontrolled cough at times  Abdominal:      Palpations: Abdomen is soft. Musculoskeletal:      Right lower leg: No edema. Left lower leg: No edema. Skin:     General: Skin is warm. Neurological:      Mental Status: She is alert and oriented to person, place, and time. ASSESSMENT and PLAN  Diagnoses and all orders for this visit:    1. Morbid obesity ([address]     -    patient to continue LCD as tolerated.  Will want to speak to LSW    Follow-up and Dispositions    Return in about 1 month (around 1/14/2023), or if symptoms worsen or fail to improve.       lab results and schedule of future lab studies reviewed with patient  reviewed diet, exercise and weight control  reviewed medications and side effects in detail

## 2022-12-14 NOTE — PROGRESS NOTES
Weight Management. 1 month follow up. 1. Have you been to the ER, urgent care clinic since your last visit? Hospitalized since your last visit? Yes, [facility] ER for asthma attack - upper respiratory infection. 2. Have you seen or consulted any other health care providers outside of the [address] [place] [address] since your last visit? Include any pap smears or colon screening. [facility] yesterday.     BMI - 44.7

## 2022-12-15 ENCOUNTER — CLINICAL SUPPORT (OUTPATIENT)
Dept: SURGERY | Age: 67
End: 2022-12-15

## 2022-12-15 DIAGNOSIS — E66.01 MORBID OBESITY (HCC): Primary | ICD-10-CM

## 2022-12-15 NOTE — PROGRESS NOTES
[facility]  Metabolic Weight Loss Program        Patient's Name: Kiki Spencer  : 1955    This patient is enrolled in [address] [place] [address] Weight Loss Program and attended the required weekly virtual nutrition class hosted via [employer].       Alexia Arechiga, MS, RD, LDN

## 2022-12-15 NOTE — PROGRESS NOTES
3 [facility]  Metabolic Program Follow-up Nutrition Consult    Date: 12/15/2022   Physician: Sara De Santiago NP  Name: April Maldonado  :  1955    Type of Plan: LCD  Weeks on Plan: 2 months  Virtual visit was completed through [address] [place] [address]. ASSESSMENT:    Medications/Supplements:   Prior to Admission medications    Medication Sig Start Date End Date Taking? Authorizing Provider   predniSONE (DELTASONE) 10 mg tablet Taper as directed 22  Provider, Historical   guaiFENesin-codeine (ROBITUSSIN AC) 100-10 mg/5 mL solution Take 5 mL by mouth three (3) times daily as needed for Cough. 22   Provider, Historical   benzonatate (TESSALON) 100 mg capsule TAKE 1 CAPSULE BY MOUTH THREE TIMES DAILY AS NEEDED FOR COUGH 22   Provider, Historical   amLODIPine-benazepril (LOTREL) 5-20 mg per capsule Take 1 Capsule by mouth daily. 10/3/22   Tim Herbert MD   cholecalciferol, vitamin D3, (Vitamin D3) 50 mcg (2,000 unit) tab Take 1 Tablet by mouth daily. 22   Tim Herbert MD   atorvastatin (LIPITOR) 40 mg tablet Take 1 Tablet by mouth nightly. 22   Tim Herbert MD   furosemide (LASIX) 20 mg tablet Take 20 mg by mouth daily. 3/2/22   Provider, Historical   rivaroxaban (XARELTO) 20 mg tab tablet Take 1 Tablet by mouth daily (with dinner). 10/30/21   Ceci Colón MD   aspirin 81 mg chewable tablet Take 81 mg by mouth daily. Kalyani Mclaughlin MD   ferrous sulfate 325 mg (65 mg iron) tablet Take 325 mg by mouth Daily (before breakfast). Kalyani Mclaughlin MD   calcium citrate/vitamin D3 (CALCIUM CITRATE + D PO) Take 3 Tablets by mouth nightly. Kalyani Mclaughlin MD   multivitamin, tx-iron-ca-min (THERA-M w/ IRON) 9 mg iron-400 mcg tab tablet Take 1 Tablet by mouth daily. Kalyani Mclaughlin MD   azelastine (ASTELIN) 137 mcg (0.1 %) nasal spray 2 Sprays by Nasal route two (2) times a day.     Provider, Historical   mepolizumab (Nucala) 100 mg/mL syrg injection 100 mg by SubCUTAneous route every thirty (30) days. Provider, Historical   cyanocobalamin (VITAMIN B-12) 1,000 mcg sublingual tablet Take 1,000 mcg by mouth daily. Provider, Historical   tiotropium bromide (SPIRIVA RESPIMAT) 1.25 mcg/actuation inhaler Take 2 Puffs by inhalation daily. Provider, Historical   fluticasone propionate (FLONASE) 50 mcg/actuation nasal spray 2 Sprays by Both Nostrils route daily as needed. Other, MD Kalyani   fluticasone furoate-vilanteroL (BREO ELLIPTA) 200-25 mcg/dose inhaler Take 1 Puff by inhalation daily. Other, MD Kalyani   montelukast (SINGULAIR) 10 mg tablet Take 1 Tab by mouth nightly. 2/15/19   Verito Montalvo MD   albuterol (VENTOLIN HFA) 90 mcg/actuation inhaler Take 2 Puffs by inhalation every four (4) hours as needed for Wheezing. 12/28/17   Trenton Solano PA-C              Starting Weight: 239#   Current Weight: 229#  (231# last visit one month ago)  Overall Pounds Lost: 10#  Overall Pounds Gained: 0    Exercise/Physical Activity:not reported    Is patient using [employer] products:yes  If yes, how many per day: 2    Aversions/side effects of product/program:none    Fluids used to mix with products:water    Reported Diet History:sick for one week since last visit, schedule off and eating habits sporadic because not as hungry. Higher intake of refined carbs such as crackers and chicken noodle soup because being sick. Still having ND shakes and ND bars. Beverages: water, coffee -  2 splenda and dab of milk. 7 ounce pepsi once a day. Up early at 5:30am - coffee, toast PRN  8am-9am - ND shake  May snack here, but rarely  1p-2p - ND shake  5-6pm - may cook or may have lean cuisine since lives alone. Doesn't want to cook for just her at times. Snacks: keeps grandchildren snacks at her house. May get a cookie or other refined carbs.     Barriers/concerns preventing patient from achieving goal(s) since last visit: trigger snacks in the home from ricky. NUTRITION INTERVENTION:  Pt educated on nutrition recommendations for the [employer] Low Calorie Plan (LCD), with the specific meal pattern of 2 meal replacements every day plus a grocery meal and snack. Daily recommended totals: 1200 calories, 60 grams carbs, 80+ grams protein, and remaining calories as healthy fats. Use LCD handout for meal and snack suggestions and preparation. Grocery meal:  Use the balanced plate method to plan meals, include 3-6 oz of lean source of protein, unlimited non-starchy vegetables, 1/2 cup whole grains/beans OR 1/2 cup fruit OR 1 serving of low fat dairy. Utilize handouts listing healthy snack and meal ideas. Read all nutrition labels. Demonstrated and emphasized identifying serving size, total fat, sugar and protein content. Defined low fat as </= 3 g per serving. Discussed lean and extra lean sources of protein. Avoid foods with sugar listed in the first 3 ingredients and >/10 g sugar per serving. Consume meal replacements every three to four hours or pattern as discussed with provider. Mix with water. May add herbs/spices for taste. Practice mindful eating habits; take small bites, chew thoroughly, avoid distractions, utilize hunger/fullness scale. Reviewed attendance policy of attending weekly nutrition classes. Metabolic support group recommended, and increase physical activity (approved per MD) for long term weight maintenance. NUTRITION MONITORING AND EVALUATION:  10# loss x 2 months. Meeting goals, no nutrition care plan changes. Get the grand kids snacks out of the home and have them bring their own when visiting to reduce temptation. Pt motivated, adherence likely. Followup one month. The following goals were established with patient;  1) chicken bone broth to sip for more protein. 2) plenty of fluids, 64+ ounces per day  3) increase exercise as tolerated up to 150 minutes per week once feeling better.       Specific tips and techniques to facilitate compliance with above recommendations were provided and discussed. If further details are desired please contact me at [phone]. This phone number was also provided to the patient for any further questions or concerns.           Mirtha Duncan MS, RD, LDN

## 2022-12-19 ENCOUNTER — OFFICE VISIT (OUTPATIENT)
Dept: SURGERY | Age: 67
End: 2022-12-19
Payer: MEDICARE

## 2022-12-19 DIAGNOSIS — F50.9 EATING DISORDER, UNSPECIFIED TYPE: ICD-10-CM

## 2022-12-19 DIAGNOSIS — E66.01 MORBID OBESITY (HCC): Primary | ICD-10-CM

## 2022-12-19 NOTE — PROGRESS NOTES
[address]  Psychological Intake Assessment    *CONFIDENTIAL REPORT*    This note will not be viewable in [facility] for the following reason(s). This is a Psychotherapy Note. ([address], [facility] [address] Providers Only)    Date of Intake: 22  Start Time:  1:03 PM  End Time:  2:00 PM  CPT code: [address] (Y/N): N     Name: April Maldonado      :  1955   Gender:  female   Marital Status:      Race/Ethnicity:  BLACK/       Chief Complaint/Presenting Problem:   Ms. Shereen Gonzáles is a 69year old  female who has had a history of weight loss issues in the past and most recently has decided she would like to lose some weight. Her current height is 5' and she is currently 229 lbs. She was referred to this clinician by Sara De Santiago NP, who is her provider with medical weight management at [facility].  She reports that she had bariatric surgery in  and went down from 260 to 158 lbs. She stated that she kept that weight for about a year and then started to return to her starting weight over the years. She states that her issues are poor eating habits and limited exercise. Ms. Shereen Gonzáles reports that her  of 44 years  unexpectedly of cancer 4 months after his diagnosis in . She reports that she attended counseling briefly during that time and felt it was beneficial to address mood. She presents with mildly depressed mood and reports that often times she feels bored and finds that she resorts to eating as her coping skill. She states that she use to do bowling quite frequently, but in the last few years she has not been able to do that. She is frustrated with her physical limitations and how this impacts her relationships with others, her level of independence, and her doesn't like shopping for clothing as she feels none of the clothes fit her.   She recognizes that snacking on chips, cupcakes, popcorn, and PEPSI are her biggest challenges to her diet success. She also admits to eating at all-you-can-eat buffets at times, but states that she does this less frequently now. She states she is trying to cut back on her PEPSI intake by drinking smaller cans and buys Skinny Pop to snack on. She reports that she eats as a way to deal with boredom and often times does this while watching TV. She states that from the moment that she wakes up in the morning she has a strong urge to eat. She states that while she is making some progress but feels that she is not making the progress she would like and needs some accountability. Regarding activity, she reports she is not doing any exercise currently, but was walking 5,000+ steps daily prior to the cold weather. She denies any past history of mental health issues and denies any past history of thoughts of harm to herself or others. She also denies any substance abuse history. The only counseling she received was grief counseling to deal with the loss of her  in 2018. Her  had been stationed in the [place] area with the [employer] and they had lived in various locations during his time in the [employer]. She reports having a HS education and a history of working as a  (worked at [employer] for 20+ years). She states that she is adjusting to living in [place], [place] for the past year since moving back to the area. She was born and raised in [place], but was living in [place] since 1975 with her  who was [employer] and would move from place to place frequently. She attends a Druze on special occasions with her mother (who also lives in [place]) at [address] [place] [address] on [place] but states that prior to [place] she attended a Druze regularly in [place]. She is very close with her family (both parents live in [place], two daughters live in [place] ([facility]). She reports that she has 4 grandchildren and 3 great grandchildren. She lives alone, but states that she is friendly with her neighbors. Overall, she feels that she would like to get back to having a healthier lifestyle and being able to do more things with her friends and family. She would like to continues with individual counseling to address eating habits and develop a healthier lifestyle. This clinician gave the patient a journal to track her emotions related to the foods she is eating at each meal (pre, during and post meal). This clinician also provided the patient with a few handouts specific to the challenges of making healthy lifestyle changes and ways to approach these changes. This therapist also recommended that this patient return for counseling to discuss in depth strategies for making health changes. Encounter Diagnoses     ICD-10-CM ICD-9-CM   1. Morbid obesity ([facility]  E66.01 278.01   2. Eating disorder, unspecified type  F50.9 307.50      Plan: This clinician would like to meet with Ms. Emely Barrett for individual counseling on a weekly to biweekly basis to address eating disorder and assist her in making healthy lifestyle changes. Next Session:  TBD - to be scheduled after January 2, 2023  Telehealth (Y/N): TBD - both options available    Treatment Plan:    Goal #1:  Develop strategies for managing weight issues. Objective #1:  Improve chronic health issues by 75%    Goal #2:  Improve social functioning with friends and increase ways of connecting with others.   Objective #1:  Improve social functioning by 75%    Regla Kerr LPC, Naina, GAURAVP

## 2022-12-30 ENCOUNTER — CLINICAL SUPPORT (OUTPATIENT)
Dept: SURGERY | Age: 67
End: 2022-12-30

## 2022-12-30 VITALS
OXYGEN SATURATION: 99 % | DIASTOLIC BLOOD PRESSURE: 84 MMHG | BODY MASS INDEX: 44.37 KG/M2 | TEMPERATURE: 97.8 F | HEIGHT: 60 IN | HEART RATE: 71 BPM | RESPIRATION RATE: 18 BRPM | SYSTOLIC BLOOD PRESSURE: 129 MMHG | WEIGHT: 226 LBS

## 2022-12-30 DIAGNOSIS — E66.01 MORBID OBESITY (HCC): Primary | ICD-10-CM

## 2022-12-30 NOTE — PROGRESS NOTES
Progress Note: Weekly Education Class in the [facility]         Patient is on Very Low Calorie Diet [] (4 meal replacements per day, 800 kcal/day)      Low Calorie Diet [x] (2-3 meal replacements per day, 6542-3092 kcal/day)    1) Did patient have any new symptoms or physical problems? Yes []    No [x]    If yes, check & comment: weakness [], fatigue [], lightheadedness [], headache [], cramps [], cold intolerance [], hair loss [], diarrhea [], constipation [],  NA [] other:                                 2) Has patient had any medical attention from other providers, urgent care or the emergency room this week? Yes [x]  No []       NA [], If yes, why: Seen by Dr Clyde Murry regarding cough                                      3) Any other sugar sweetened beverages consumed this week? Yes [x]  No []    4) Did patient have any problems adhering to the diet? Yes []  No [] NA []    If yes, Vacation [], Celebrations [x], Conferences [], Family Reunions [] other:                                                5) How many hours of sleep this week? 26    (range)  NA []    Number of meal replacements consumed daily? 2 (range)  NA []    Average ounces of water patient consumed daily this week (not including shakes)? 38     (divide the weekly total by 7)    Did you eat any food outside of the program? Yes [x] No []    Physical Activity Over the Past Week:    Cardio exercise: 0 min  Strength exercise: 0 workouts / week  Number of steps walked per day: 2,756    How has patient mood overall been this week? Sad [], Happy [], Stressed [], Tired [], Content [], NA [], other            Medications reconciled by nurse Yes [x]  No[]    Patient was given therapeutic recommendations for any noted side effects of their dietary approach based upon [facility] patient manual per providers recommendation.

## 2023-01-04 ENCOUNTER — OFFICE VISIT (OUTPATIENT)
Dept: SURGERY | Age: 68
End: 2023-01-04
Payer: MEDICARE

## 2023-01-04 DIAGNOSIS — E66.01 MORBID OBESITY (HCC): Primary | ICD-10-CM

## 2023-01-04 DIAGNOSIS — F50.9 EATING DISORDER, UNSPECIFIED TYPE: ICD-10-CM

## 2023-01-04 PROCEDURE — 90837 PSYTX W PT 60 MINUTES: CPT | Performed by: SURGERY

## 2023-01-04 PROCEDURE — G8417 CALC BMI ABV UP PARAM F/U: HCPCS | Performed by: SURGERY

## 2023-01-04 PROCEDURE — G8428 CUR MEDS NOT DOCUMENT: HCPCS | Performed by: SURGERY

## 2023-01-04 PROCEDURE — G8536 NO DOC ELDER MAL SCRN: HCPCS | Performed by: SURGERY

## 2023-01-04 PROCEDURE — G8432 DEP SCR NOT DOC, RNG: HCPCS | Performed by: SURGERY

## 2023-01-04 PROCEDURE — 1123F ACP DISCUSS/DSCN MKR DOCD: CPT | Performed by: SURGERY

## 2023-01-04 NOTE — PROGRESS NOTES
[address]  In-Office Counseling Session Note    This [facility] not be [facility]able in [facility] for the following reason(s). This is a Psychotherapy Note. ([address], [facility] [address] Providers Only)    Name: Minoo Roberto      :  1955   Gender:  female   Marital Status:     Race/Ethnicity:  BLACK/     Date:  23  Time Start: 10:00 AM   Time End:11:00 AM  CPT code:  01471     Chief Complaint/Presenting Problem:  Frustration with eating habits and social interactions after the holidays   Current Symptoms:  Seda Crane reports that she is feeling frustrated with her lack of control in her eating and with her mobility. She reports that she often is feeling alone and resorts to eating to deal with her frustrations. Subjective (patient report):  Seda Crane reported today that she had a good Michelle break and stated \"too good. \"  She reported that she ate too much food and did not eat healthy foods over the holidays. She reported having a nice time with family, but then felt that she continued to eat excessively after they left. She described feeling frustrated with her physical activity, but states that she would like to work on doing more walking around [place] and visit a bowling alley. She is frustrated because she is in the house most of the time and runs out of things go do. Objective (factual account of what was observed, mental health status):  Minoo Roberto is a 69 y.o. female who is alert and oriented X3. She does not have any suicidal or homicidal ideations. Patient is not psychotic or delusional.   She has not been psychiatrically admitted to a hospital. Ms. Kera Bragg does have good eye contact during this interview. She is verbal and engaging. Patient does have good insight and judgement. She is a good candidate for short term therapy to address the above issues. Assessment:     Risk Assessment (SI, HI, DV, unsafe environment):  No history of SI or HI. Denies any concerns. If Yes, identify safety plan:  N/A    Clinical Intervention: This therapist engaged the client in processing her Michelle and how she did with her diet. They also discussed ways that she was able to be social over the holidays and was in which she can improve her social activity level now that the family is no longer visiting ([address] group, word games shared with family). Progress: Lucian Torres reports that she would like to start making some progress by using her food journal, finding other activities to do (socially) besides eating, and would like to lose some weight so that she can get around better. At present she feels she has not made any progress on her goals but wants to have a positive start in the new year. Plan: This therapist will meet with Lucian Torres bi-weekly to address eating disorder and healthy lifestyle changes. Additionally, they will work finding more social outlets for her to keep from being lonely Christen Watts lives alone and does not get out often). Follow-up appt date (if applicable) is:  TBD    Diagnoses:     ICD-10-CM ICD-9-CM    1. Morbid obesity ([facility]  E66.01 278.01       2.  Eating disorder, unspecified type  F50.9 307.50              Signature: Gianni Morton LPC, NCC, CCTP     Date:  1/4/23

## 2023-01-17 ENCOUNTER — OFFICE VISIT (OUTPATIENT)
Dept: SURGERY | Age: 68
End: 2023-01-17

## 2023-01-17 ENCOUNTER — CLINICAL SUPPORT (OUTPATIENT)
Dept: SURGERY | Age: 68
End: 2023-01-17

## 2023-01-17 DIAGNOSIS — E66.01 MORBID OBESITY (HCC): Primary | ICD-10-CM

## 2023-01-17 NOTE — PROGRESS NOTES
[facility]  Metabolic Program Follow-up Nutrition Consult    Date: 2023   Physician: Luis Eduardo Noyola NP  Name: Jessika Spann  :  1955    Type of Plan: LCD   Weeks on Plan:  3 months  Virtual visit was completed through [address] [place] [address]. ASSESSMENT:    Medications/Supplements:   Prior to Admission medications    Medication Sig Start Date End Date Taking? Authorizing Provider   guaiFENesin-codeine (ROBITUSSIN AC) 100-10 mg/5 mL solution Take 5 mL by mouth three (3) times daily as needed for Cough. 22   Provider, Historical   benzonatate (TESSALON) 100 mg capsule TAKE 1 CAPSULE BY MOUTH THREE TIMES DAILY AS NEEDED FOR COUGH 22   Provider, Historical   amLODIPine-benazepril (LOTREL) 5-20 mg per capsule Take 1 Capsule by mouth daily. 10/3/22   Liza De La O MD   cholecalciferol, vitamin D3, (Vitamin D3) 50 mcg (2,000 unit) tab Take 1 Tablet by mouth daily. 22   Liza De La O MD   atorvastatin (LIPITOR) 40 mg tablet Take 1 Tablet by mouth nightly. 22   Liza De La O MD   furosemide (LASIX) 20 mg tablet Take 20 mg by mouth daily. 3/2/22   Provider, Historical   rivaroxaban (XARELTO) 20 mg tab tablet Take 1 Tablet by mouth daily (with dinner). 10/30/21   Simoes, Chrys Collet, MD   aspirin 81 mg chewable tablet Take 81 mg by mouth daily. Kalyani Mclaughlin MD   ferrous sulfate 325 mg (65 mg iron) tablet Take 325 mg by mouth Daily (before breakfast). Kalyani Mclaughlin MD   calcium citrate/vitamin D3 (CALCIUM CITRATE + D PO) Take 3 Tablets by mouth nightly. Kalyani Mclaughlin MD   multivitamin, tx-iron-ca-min (THERA-M w/ IRON) 9 mg iron-400 mcg tab tablet Take 1 Tablet by mouth daily. Kalyani Mclaughlin MD   azelastine (ASTELIN) 137 mcg (0.1 %) nasal spray 2 Sprays by Nasal route two (2) times a day. Provider, Historical   mepolizumab (Nucala) 100 mg/mL syrg injection 100 mg by SubCUTAneous route every thirty (30) days.     Provider, Historical   cyanocobalamin (VITAMIN B-12) 1,000 mcg sublingual tablet Take 1,000 mcg by mouth daily. Provider, Historical   tiotropium bromide (SPIRIVA RESPIMAT) 1.25 mcg/actuation inhaler Take 2 Puffs by inhalation daily. Provider, Historical   fluticasone propionate (FLONASE) 50 mcg/actuation nasal spray 2 Sprays by Both Nostrils route daily as needed. Other, MD Kalyani   fluticasone furoate-vilanteroL (BREO ELLIPTA) 200-25 mcg/dose inhaler Take 1 Puff by inhalation daily. Other, MD Kalyani   montelukast (SINGULAIR) 10 mg tablet Take 1 Tab by mouth nightly. 2/15/19   Nae Donis MD   albuterol (VENTOLIN HFA) 90 mcg/actuation inhaler Take 2 Puffs by inhalation every four (4) hours as needed for Wheezing. 12/28/17   Malcom Jeans, PA-C              Starting Weight: 239#   Current Weight: 226# (229# last visit one month ago)  Overall Pounds Lost: 13#  Overall Pounds Gained: 0    Exercise/Physical Activity:not reported    Is patient using [employer] products:yes   If yes, how many per day: 2    Aversions/side effects of product/program:none    Fluids used to mix with products:water    Reported Diet History:  5am coffee, waffle, no toppings  8am coffee #2, scrambled egg with a second waffle  Middle of the day yesterday at mom's house ham sandwich, pepsi 12 ounces, cookies  5:30pm - 2 baked pork chops, steamed vegetables, cookies  8:30pm - wanted to snack on something, apple  10:00pm - wanted to find a second snack cookies    Yesterday was not a typical day. Typically will have ND products around 9-10am and around 2-4 pm.  If watching tv, will have a ND bar or skinny pop    Beverages:  water 48-50 ounces per day. Trying to wean pepsi. Down from tall to little cans once daily.     Barriers/concerns preventing patient from achieving goal(s) since last visit:soda    NUTRITION INTERVENTION:  Pt educated on nutrition recommendations for the [employer] Low Calorie Plan (LCD), with the specific meal pattern of 2 meal replacements every day plus a grocery meal and snack. Daily recommended totals: 1200 calories, 60 grams carbs, 80+ grams protein, and remaining calories as healthy fats. Use LCD handout for meal and snack suggestions and preparation. Grocery meal:  Use the balanced plate method to plan meals, include 3-6 oz of lean source of protein, unlimited non-starchy vegetables, 1/2 cup whole grains/beans OR 1/2 cup fruit OR 1 serving of low fat dairy. Utilize handouts listing healthy snack and meal ideas. Read all nutrition labels. Demonstrated and emphasized identifying serving size, total fat, sugar and protein content. Defined low fat as </= 3 g per serving. Discussed lean and extra lean sources of protein. Avoid foods with sugar listed in the first 3 ingredients and >/10 g sugar per serving. Consume meal replacements every three to four hours or pattern as discussed with provider. Mix with water. May add herbs/spices for taste. Practice mindful eating habits; take small bites, chew thoroughly, avoid distractions, utilize hunger/fullness scale. Reviewed attendance policy of attending weekly nutrition classes. Metabolic support group recommended, and increase physical activity (approved per MD) for long term weight maintenance. NUTRITION MONITORING AND EVALUATION: 13# loss x 3 months, with 3# loss x 30 days over the holidays. Pt doing well, remains motivated. Followup one month. The following goals were established with patient;  1) stop eating at 8pm or nothing before 2 hours before bed  2) hummus, greek yogurt ranch, guac dip, handful nuts as healthy snacks. Use LCD snack list for examples  3) no soda, increase water to 60-80 ounces daily  4) increase movement as tolerated up to 150 minutes per week  5) limit refined carbs, no more than 15-30 grams total carbs at meal, which is about tennis ball size or quarter of the plate. Use page 3 in LCD handout for complex carb examples.   No popcorn        Specific tips and techniques to facilitate compliance with above recommendations were provided and discussed. If further details are desired please contact me at [phone]. This phone number was also provided to the patient for any further questions or concerns.           Amee Powell MS, RD, LDN

## 2023-01-18 ENCOUNTER — OFFICE VISIT (OUTPATIENT)
Dept: SURGERY | Age: 68
End: 2023-01-18
Payer: MEDICARE

## 2023-01-18 VITALS
RESPIRATION RATE: 18 BRPM | OXYGEN SATURATION: 98 % | TEMPERATURE: 98 F | HEART RATE: 71 BPM | SYSTOLIC BLOOD PRESSURE: 111 MMHG | WEIGHT: 228.5 LBS | DIASTOLIC BLOOD PRESSURE: 74 MMHG | HEIGHT: 60 IN | BODY MASS INDEX: 44.86 KG/M2

## 2023-01-18 DIAGNOSIS — E78.5 HYPERLIPIDEMIA, UNSPECIFIED HYPERLIPIDEMIA TYPE: ICD-10-CM

## 2023-01-18 DIAGNOSIS — E66.01 MORBID OBESITY (HCC): Primary | ICD-10-CM

## 2023-01-18 DIAGNOSIS — I10 PRIMARY HYPERTENSION: ICD-10-CM

## 2023-01-18 NOTE — PROGRESS NOTES
[address] [facility]   HISTORY OF PRESENT ILLNESS  Oliver Dukes is a 69 y.o. female. Patient was seen for a follow up. Is an AA. PMH reviewed and listed below. Patient has gained 2 lbs back. Reports that after she was sick she just kept eating and was not eating her foods and choices. There is very little activity going on as well. Is motivated, but trying to determine her choices with the food therapy she has began. This so far is helpful. Is to log what she is eats and feeling at the time of it. Wants dietician work on giving her a menu to giver her direction and portion help  Not on medications to help with weight   Visit Vitals  /74 (BP 1 Location: Left upper arm, BP Patient Position: Sitting, BP Cuff Size: Large adult)   Pulse 71   Temp 98 °F (36.7 °C) (Oral)   Resp 18   Ht 5' (1.524 m)   Wt 228 lb 8 oz (103.6 kg)   SpO2 98%   BMI 44.63 kg/m²     Past Medical History:   Diagnosis Date    Asthma     Diabetes ([address]     reports she is borderline diabetic     Hypertension     Kidney stone     Left ureteral stone     Renal colic on left side     Stroke ([facility] 10/10/2022     Past Surgical History:   Procedure Laterality Date    HX COLONOSCOPY  01/22/2019    HX GASTRIC BYPASS  2012    HX KNEE REPLACEMENT Bilateral     2003, 2004    HX OTHER SURGICAL  09/06/2020    Kidney stones removed at [facility] in [place], [place]    HX TUBAL LIGATION       Family History   Problem Relation Age of Onset    Hypertension Mother     Obesity Mother     Hypertension Father     Diabetes Maternal Grandmother     Hypertension Maternal Grandmother      Outpatient Encounter Medications as of 1/18/2023   Medication Sig Dispense Refill    guaiFENesin-codeine (ROBITUSSIN AC) 100-10 mg/5 mL solution Take 5 mL by mouth three (3) times daily as needed for Cough.       benzonatate (TESSALON) 100 mg capsule TAKE 1 CAPSULE BY MOUTH THREE TIMES DAILY AS NEEDED FOR COUGH      amLODIPine-benazepril (LOTREL) 5-20 mg per capsule Take 1 Capsule by mouth daily. 60 Capsule 2    cholecalciferol, vitamin D3, (Vitamin D3) 50 mcg (2,000 unit) tab Take 1 Tablet by mouth daily. 90 Tablet 5    atorvastatin (LIPITOR) 40 mg tablet Take 1 Tablet by mouth nightly. 90 Tablet 1    furosemide (LASIX) 20 mg tablet Take 20 mg by mouth daily. rivaroxaban (XARELTO) 20 mg tab tablet Take 1 Tablet by mouth daily (with dinner). 30 Tablet 1    aspirin 81 mg chewable tablet Take 81 mg by mouth daily. ferrous sulfate 325 mg (65 mg iron) tablet Take 325 mg by mouth Daily (before breakfast). calcium citrate/vitamin D3 (CALCIUM CITRATE + D PO) Take 3 Tablets by mouth nightly. multivitamin, tx-iron-ca-min (THERA-M w/ IRON) 9 mg iron-400 mcg tab tablet Take 1 Tablet by mouth daily. azelastine (ASTELIN) 137 mcg (0.1 %) nasal spray 2 Sprays by Nasal route two (2) times a day. mepolizumab (Nucala) 100 mg/mL syrg injection 100 mg by SubCUTAneous route every thirty (30) days. cyanocobalamin (VITAMIN B-12) 1,000 mcg sublingual tablet Take 1,000 mcg by mouth daily. tiotropium bromide (SPIRIVA RESPIMAT) 1.25 mcg/actuation inhaler Take 2 Puffs by inhalation daily. fluticasone propionate (FLONASE) 50 mcg/actuation nasal spray 2 Sprays by Both Nostrils route daily as needed. fluticasone furoate-vilanteroL (BREO ELLIPTA) 200-25 mcg/dose inhaler Take 1 Puff by inhalation daily. montelukast (SINGULAIR) 10 mg tablet Take 1 Tab by mouth nightly. 30 Tab 2    albuterol (VENTOLIN HFA) 90 mcg/actuation inhaler Take 2 Puffs by inhalation every four (4) hours as needed for Wheezing. 1 Inhaler 0     No facility-administered encounter medications on file as of 1/18/2023. HPI    Review of Systems   Constitutional: Negative. Respiratory: Negative. Cardiovascular: Negative. Gastrointestinal:  Negative for nausea and vomiting. Musculoskeletal:  Positive for back pain and joint pain. Neurological: Negative. Psychiatric/Behavioral:  Positive for depression. Physical Exam  Vitals and nursing note reviewed. Constitutional:       Appearance: She is obese. Cardiovascular:      Rate and Rhythm: Normal rate and regular rhythm. Pulmonary:      Effort: Pulmonary effort is normal.      Breath sounds: Normal breath sounds. Abdominal:      General: Bowel sounds are normal.      Palpations: Abdomen is soft. Musculoskeletal:      Cervical back: Neck supple. Skin:     General: Skin is warm. Neurological:      Mental Status: She is alert and oriented to person, place, and time. Psychiatric:         Behavior: Behavior normal.       ASSESSMENT and PLAN  Diagnoses and all orders for this visit:    1. Morbid obesity ([address]      -   doing the LCD and reviewed examples. Will get dietician to help with an example week to use      -     work with therapy to help determine why food is her go to     2. Hyperlipidemia, unspecified hyperlipidemia type    3.  Primary hypertension    Follow-up and Dispositions    Return in about 1 month (around 2/18/2023), or if symptoms worsen or fail to improve.       lab results and schedule of future lab studies reviewed with patient  reviewed diet, exercise and weight control  reviewed medications and side effects in detail

## 2023-01-18 NOTE — PROGRESS NOTES
Identified pt with two pt identifiers (name and ). Reviewed chart in preparation for visit and have obtained necessary documentation. Oliver Dukes is a 69 y.o. female  Chief Complaint   Patient presents with    Weight Management     1 Month Follow Up     BMI 44.4    Visit Vitals  /74 (BP 1 Location: Left upper arm, BP Patient Position: Sitting, BP Cuff Size: Large adult)   Pulse 71   Temp 98 °F (36.7 °C) (Oral)   Resp 18   Ht 5' (1.524 m)   Wt 228 lb 8 oz (103.6 kg)   SpO2 98%   BMI 44.63 kg/m²       1. Have you been to the ER, urgent care clinic since your last visit? Hospitalized since your last visit? No    2. Have you seen or consulted any other health care providers outside of the [address] [place] [address] since your last visit? Include any pap smears or colon screening. , Pulmonologist      23      Progress Note: Weekly Education Class in the [facility] Weight Loss Program         Patient is on Very Low Calorie Diet [] (4 meal replacements per day, 800 kcal/day)      Low Calorie Diet [x] (2-3 meal replacements per day, 4298-4393 kcal/day)    1) Did patient have any new symptoms or physical problems? Yes []    No [x]    If yes, check & comment: weakness [], fatigue [], lightheadedness [], headache [], cramps [], cold intolerance [], hair loss [], diarrhea [], constipation [],  NA [] other:                                  2) Has patient had any medical attention from other providers, urgent care or the emergency room this week? Yes [x]  No []       NA [], If yes, why: Dr. Lianne Skiff                                      3) Any other sugar sweetened beverages consumed this week? Yes [x]  No []    4) Did patient have any problems adhering to the diet? Yes [x]  No [] NA []    If yes, Vacation [], Celebrations [], Conferences [], Family Reunions [] other: No will power                                               5) How many hours of sleep this week?  4-7    (range)  NA []    Number of meal replacements consumed daily? 0-2 (range)  NA []    Average ounces of water patient consumed daily this week (not including shakes)? 43     (divide the weekly total by 7)    Did you eat any food outside of the program? Yes [x] No []    Physical Activity Over the Past Week:    Cardio exercise: 0 min  Strength exercise: 0 workouts / week  Number of steps walked per day: 72584    How has patient mood overall been this week? Sad [], Happy [], Stressed [], Tired [], Content [], NA [x], other             Medications reconciled by nurse Yes [x]  No[]    Patient was given therapeutic recommendations for any noted side effects of their dietary approach based upon [facility] patient manual per providers recommendation. 1/16/22      Progress Note: Weekly Education Class in the [facility] Weight Loss Program         Patient is on Very Low Calorie Diet [] (4 meal replacements per day, 800 kcal/day)      Low Calorie Diet [x] (2-3 meal replacements per day, 0021-7061 kcal/day)    1) Did patient have any new symptoms or physical problems? Yes []    No [x]    If yes, check & comment: weakness [], fatigue [], lightheadedness [], headache [], cramps [], cold intolerance [], hair loss [], diarrhea [], constipation [],  NA [] other:                                  2) Has patient had any medical attention from other providers, urgent care or the emergency room this week? Yes []  No [x]       NA [], If yes, why:                                        3) Any other sugar sweetened beverages consumed this week? Yes [x]  No []    4) Did patient have any problems adhering to the diet? Yes [x]  No [] NA []    If yes, Vacation [], Celebrations [], Conferences [], Family Reunions [] other: no will power                                               5) How many hours of sleep this week? 3-6    (range)  NA []    Number of meal replacements consumed daily?  0-2 (range)  NA []    Average ounces of water patient consumed daily this week (not including shakes)? 40     (divide the weekly total by 7)    Did you eat any food outside of the program? Yes [x] No []    Physical Activity Over the Past Week:    Cardio exercise: 0 min  Strength exercise: 0 workouts / week  Number of steps walked per day: 7861    How has patient mood overall been this week? Sad [], Happy [], Stressed [], Tired [], Content [], NA  , other             Medications reconciled by nurse Yes [x]  No[]    Patient was given therapeutic recommendations for any noted side effects of their dietary approach based upon [facility] patient manual per providers recommendation.

## 2023-01-18 NOTE — PROGRESS NOTES
[facility]  Metabolic Weight Loss Program        Patient's Name: Jaja Elder  : 1955    This patient is enrolled in [address] [place] [address] Weight Loss Program and attended the required weekly virtual nutrition class hosted via [employer] today.       Cristobal Litten, MS, RD, LDN

## 2023-01-24 ENCOUNTER — OFFICE VISIT (OUTPATIENT)
Dept: SURGERY | Age: 68
End: 2023-01-24

## 2023-01-24 DIAGNOSIS — E66.01 MORBID OBESITY (HCC): Primary | ICD-10-CM

## 2023-01-24 NOTE — PROGRESS NOTES
[facility]  Metabolic Weight Loss Program        Patient's Name: Toshia Guerra  : 1955    This patient is enrolled in [address] [place] [address] Weight Loss Program and attended the required weekly virtual nutrition class hosted via [employer].       Swati Rodriguez, MS, RD, LDN

## 2023-01-25 ENCOUNTER — OFFICE VISIT (OUTPATIENT)
Dept: SURGERY | Age: 68
End: 2023-01-25
Payer: MEDICARE

## 2023-01-25 DIAGNOSIS — E66.01 MORBID OBESITY (HCC): ICD-10-CM

## 2023-01-25 DIAGNOSIS — F50.9 EATING DISORDER, UNSPECIFIED TYPE: Primary | ICD-10-CM

## 2023-01-25 NOTE — PROGRESS NOTES
[address]  In-Office Counseling Session Note    This note will not be viewable in [email or web address] for the following reason(s). This is a Psychotherapy Note. ([address], [facility] [address] Providers Only)    Name: Stephanie Will      :  1955   Gender:  female   Marital Status:     Race/Ethnicity:  BLACK/     Date:  23  Time Start: 8:58 AM   Time End:  9:48 AM  CPT code:  82685    Chief Complaint/Presenting Problem:  Kiko Perez continues to express frustration with eating habits. She states that she feels like giving up motivation and is often not interested in losing weight, but also feels that she would like to be doing more things but is not able to with her current weight. Current Symptoms:  Feeling of a lack of control in her eating and with her mobility. She continues to report that she feels lonely and resorts to eating to deal with her frustrations. Subjective (patient report):  Kiko Perez reported today that she has been doing food journaling with a journal given to her from this therapist.  She stated that she has discovered that she eats when she is bored. She states that she also eats unhealthy foods when her family bring it over [facility]) and then eats snacks throughout the day (skinny popcorn and liza snaps). Although she is aware of this, she states she often doesn't care because she is hetal and bored. Objective (factual account of what was observed, mental health status):  Stephanie Will is a 69 y.o. female who is alert and oriented X3. She does not have any suicidal or homicidal ideations. Patient is not psychotic or delusional.   She has not been psychiatrically admitted to a hospital. Ms. Corazon Mccartney does have good eye contact during this interview. She is verbal and engaging. Patient does have good insight and judgement. She is a good candidate for short term therapy to address the above issues. Clinical Intervention:   This therapist engaged the client in processing her frustration with her eating habits, her family dynamics and how this contributes to her eating. This therapist also discussed with Marni Granados ways to develop more social networks. They looked at groups that are part of her Taoism and ways that she can get socially connected that way as she is missing her friends from where she lived before she moved to [address] [place] [address]. Progress: Marni Granados reports that she has made progress in her food journal and is paying attention more to the foods she is eating. She has made some progress in socializing and in eating more vegetables, but states these are areas she needs to keep working on. Plan: This therapist will meet with Marni Granados bi-weekly-monthly to address eating disorder and healthy lifestyle changes. Additionally, we will continue looking for more social outlets for her to keep from being lonely Erven Prom lives alone and does not get out often). Follow-up appt date (if applicable) is:  TBD    Diagnoses:     ICD-10-CM ICD-9-CM    1. Eating disorder, unspecified type  F50.9 307.50       2.  Morbid obesity ([facility] [address]  E66.01 278.01             Signature: Marito Juan LPC, NCC, CCTP     Date:  1/25/23

## 2023-01-31 ENCOUNTER — OFFICE VISIT (OUTPATIENT)
Dept: SURGERY | Age: 68
End: 2023-01-31

## 2023-01-31 DIAGNOSIS — E66.01 MORBID OBESITY (HCC): Primary | ICD-10-CM

## 2023-01-31 NOTE — PROGRESS NOTES
[facility]  Metabolic Weight Loss Program        Patient's Name: Melissa De Santiago  : 1955    This patient is enrolled in [address] [place] [address] Weight Loss Program and attended the required weekly virtual nutrition class hosted via [employer].       Servando Vera, MS, RD, LDN

## 2023-02-01 ENCOUNTER — CLINICAL SUPPORT (OUTPATIENT)
Dept: SURGERY | Age: 68
End: 2023-02-01

## 2023-02-01 VITALS
TEMPERATURE: 98.1 F | SYSTOLIC BLOOD PRESSURE: 131 MMHG | HEART RATE: 67 BPM | BODY MASS INDEX: 44.68 KG/M2 | HEIGHT: 60 IN | OXYGEN SATURATION: 98 % | DIASTOLIC BLOOD PRESSURE: 85 MMHG | WEIGHT: 227.6 LBS | RESPIRATION RATE: 18 BRPM

## 2023-02-01 DIAGNOSIS — E66.01 MORBID OBESITY (HCC): Primary | ICD-10-CM

## 2023-02-01 NOTE — PROGRESS NOTES
Week # 1    Progress Note: Weekly Education Class in the [facility] Weight Loss Program         Patient is on Very Low Calorie Diet [] (4 meal replacements per day, 800 kcal/day)      Low Calorie Diet [x] (2-3 meal replacements per day, 6161-6017 kcal/day)    1) Did patient have any new symptoms or physical problems? Yes []    No [x]    If yes, check & comment: weakness [], fatigue [], lightheadedness [], headache [], cramps [], cold intolerance [], hair loss [], diarrhea [], constipation [],  NA [] other:                                   2) Has patient had any medical attention from other providers, urgent care or the emergency room this week? Yes []  No [x]       NA [], If yes, why:                                       3) Any other sugar sweetened beverages consumed this week? Yes [x]  No []    4) Did patient have any problems adhering to the diet? Yes [x]  No [] NA []    If yes, Vacation [], Celebrations [x], Conferences [], Family Reunions [] other:                                                5) How many hours of sleep this week? 4.5-7    (range)  NA []    Number of meal replacements consumed daily? 1-3  (range)  NA []    Average ounces of water patient consumed daily this week (not including shakes)? 38     (divide the weekly total by 7)    Did you eat any food outside of the program? Yes [x] No []    Physical Activity Over the Past Week:    Cardio exercise: 0 min  Strength exercise: 0 workouts / week  Number of steps walked per day: 9572-3271    How has patient mood overall been this week? Sad [], Happy [], Stressed [], Tired [x], Content [], NA [], other            Medications reconciled by nurse Yes [x]  No[]    Patient was given therapeutic recommendations for any noted side effects of their dietary approach based upon [facility] patient manual per providers recommendation.      Week # 2    Progress Note: Weekly Education Class in the [facility] Weight Loss Program         Patient is on Very Low Calorie Diet [] (4 meal replacements per day, 800 kcal/day)      Low Calorie Diet [x] (2-3 meal replacements per day, 9201-4043 kcal/day)    1) Did patient have any new symptoms or physical problems? Yes []    No [x]    If yes, check & comment: weakness [], fatigue [], lightheadedness [], headache [], cramps [], cold intolerance [], hair loss [], diarrhea [], constipation [],  NA [] other:                                 2) Has patient had any medical attention from other providers, urgent care or the emergency room this week? Yes []  No [x]       NA [], If yes, why:                                       3) Any other sugar sweetened beverages consumed this week? Yes [x]  No []    4) Did patient have any problems adhering to the diet? Yes [x]  No [] NA []    If yes, Vacation [], Celebrations [x], Conferences [], Family Reunions [] other:                                                5) How many hours of sleep this week? 4-6.5    (range)  NA []    Number of meal replacements consumed daily? 2-3  (range)  NA []    Average ounces of water patient consumed daily this week (not including shakes)? 42     (divide the weekly total by 7)    Did you eat any food outside of the program? Yes [x] No []    Physical Activity Over the Past Week:    Cardio exercise: 0 min  Strength exercise: 0 workouts / week  Number of steps walked per day: 5407-0175    How has patient mood overall been this week? Sad [], Happy [], Stressed [], Tired [], Content [], NA [], other   NOT ANSWERED             Medications reconciled by nurse Yes [x]  No[]    Patient was given therapeutic recommendations for any noted side effects of their dietary approach based upon [facility] patient manual per providers recommendation.

## 2023-02-01 NOTE — PROGRESS NOTES
Weight Management. 1. Have you been to the ER, urgent care clinic since your last visit? Hospitalized since your last visit? No    2. Have you seen or consulted any other health care providers outside of the [address] [place] [address] since your last visit? Include any pap smears or colon screening.  No

## 2023-02-07 ENCOUNTER — OFFICE VISIT (OUTPATIENT)
Dept: SURGERY | Age: 68
End: 2023-02-07

## 2023-02-07 DIAGNOSIS — E66.01 MORBID OBESITY (HCC): Primary | ICD-10-CM

## 2023-02-07 NOTE — PROGRESS NOTES
[facility]  Metabolic Weight Loss Program        Patient's Name: Ren Sorto  : 1955    This patient is enrolled in [address] [place] [address] Weight Loss Program and attended the required weekly virtual nutrition class hosted via [employer].       Arnav Mboley, MS, RD, LDN

## 2023-02-14 ENCOUNTER — OFFICE VISIT (OUTPATIENT)
Dept: INTERNAL MEDICINE CLINIC | Age: 68
End: 2023-02-14
Payer: MEDICARE

## 2023-02-14 VITALS
RESPIRATION RATE: 18 BRPM | HEART RATE: 77 BPM | WEIGHT: 227 LBS | BODY MASS INDEX: 44.57 KG/M2 | HEIGHT: 60 IN | TEMPERATURE: 98.2 F | DIASTOLIC BLOOD PRESSURE: 87 MMHG | SYSTOLIC BLOOD PRESSURE: 135 MMHG

## 2023-02-14 DIAGNOSIS — Z11.59 ENCOUNTER FOR HEPATITIS C SCREENING TEST FOR LOW RISK PATIENT: ICD-10-CM

## 2023-02-14 DIAGNOSIS — Z98.84 BARIATRIC SURGERY STATUS: ICD-10-CM

## 2023-02-14 DIAGNOSIS — M15.9 GENERALIZED OA: ICD-10-CM

## 2023-02-14 DIAGNOSIS — E66.01 MORBID OBESITY (HCC): ICD-10-CM

## 2023-02-14 DIAGNOSIS — I10 PRIMARY HYPERTENSION: ICD-10-CM

## 2023-02-14 DIAGNOSIS — R73.03 PREDIABETES: ICD-10-CM

## 2023-02-14 DIAGNOSIS — I10 PRIMARY HYPERTENSION: Primary | ICD-10-CM

## 2023-02-14 PROCEDURE — 1090F PRES/ABSN URINE INCON ASSESS: CPT | Performed by: INTERNAL MEDICINE

## 2023-02-14 PROCEDURE — G8510 SCR DEP NEG, NO PLAN REQD: HCPCS | Performed by: INTERNAL MEDICINE

## 2023-02-14 PROCEDURE — 3017F COLORECTAL CA SCREEN DOC REV: CPT | Performed by: INTERNAL MEDICINE

## 2023-02-14 PROCEDURE — G8427 DOCREV CUR MEDS BY ELIG CLIN: HCPCS | Performed by: INTERNAL MEDICINE

## 2023-02-14 PROCEDURE — G8417 CALC BMI ABV UP PARAM F/U: HCPCS | Performed by: INTERNAL MEDICINE

## 2023-02-14 PROCEDURE — 99214 OFFICE O/P EST MOD 30 MIN: CPT | Performed by: INTERNAL MEDICINE

## 2023-02-14 PROCEDURE — G9899 SCRN MAM PERF RSLTS DOC: HCPCS | Performed by: INTERNAL MEDICINE

## 2023-02-14 PROCEDURE — G8536 NO DOC ELDER MAL SCRN: HCPCS | Performed by: INTERNAL MEDICINE

## 2023-02-14 PROCEDURE — G8400 PT W/DXA NO RESULTS DOC: HCPCS | Performed by: INTERNAL MEDICINE

## 2023-02-14 PROCEDURE — 1101F PT FALLS ASSESS-DOCD LE1/YR: CPT | Performed by: INTERNAL MEDICINE

## 2023-02-14 NOTE — PROGRESS NOTES
Chief Complaint   Patient presents with    Hypertension       1. \"Have you been to the ER, urgent care clinic since your last visit? Hospitalized since your last visit? \" Yes When: 1/236 Where: [facility] Reason for visit: asthma attack     2. \"Have you seen or consulted any other health care providers outside of the [address] [place] [address] since your last visit? \" No     3. For patients aged 39-70: Has the patient had a colonoscopy / FIT/ Cologuard? Yes - no Care Gap present      If the patient is female:    4. For patients aged 41-77: Has the patient had a mammogram within the past 2 years? Yes - no Care Gap present      5. For patients aged 21-65: Has the patient had a pap smear?  NA - based on age or sex

## 2023-02-14 NOTE — PROGRESS NOTES
Skip Miranda is a 69 y.o. female and presents with Hypertension    . Subjective:    (mother is Jeana Wu)  [address] from [facility] 2/5/2022    Pts le edema has improved w weekly walks. She is hoping to increase the frequency    PFO-no surgical closure indicated as per cards    Pts katharine le weakness since TIA x 2 has improved post PT    Chronic le pain. Told she has lumbar DJD (vs DDD). Pain has decreased of late    Pt is enrolled in [facility] weight loss program.      PMH- HTN  BP Readings from Last 3 Encounters:   02/14/23 135/87   02/01/23 131/85   01/18/23 111/74        Asthma- Dr. Norm Carpenter of [place]     HLD-on atorvastatin 40mg  Lab Results   Component Value Date/Time    Cholesterol, total 181 03/23/2022 02:39 PM    HDL Cholesterol 92 03/23/2022 02:39 PM    LDL, calculated 77 03/23/2022 02:39 PM    LDL, calculated 41 10/28/2021 04:34 AM    VLDL, calculated 12 03/23/2022 02:39 PM    Triglyceride 60 03/23/2022 02:39 PM    CHOL/HDL Ratio 1.5 10/28/2021 04:34 AM      OA   Osteoporosis   PFO on chronic AC   H/o TIA   H/o kidney stones     PSH- katharine TKR   Gastric bypass    SH-   Lives alone   2 daughters and 4 grandchildren    FH- 6 siblings    HM  mammo- UTD  Colonoscopy- 1/22/2019  Immunizations  Eye care ~ 2 yrs ago  Dental care  BMD      Review of Systems  Constitutional: negative for fevers, chills, anorexia and weight loss  Eyes:   negative for visual disturbance and irritation  ENT:   negative for tinnitus,sore throat,nasal congestion,ear pains. hoarseness  Respiratory:  negative for cough, hemoptysis, dyspnea,wheezing  CV:   negative for chest pain, palpitations, lower extremity edema  GI:   negative for nausea, vomiting, diarrhea, abdominal pain,melena  Musculoskel: positive for myalgias, arthralgias, back pain, muscle weakness, joint pain  Neurological:  negative for headaches, dizziness, vertigo, memory problems and gait   Behavl/Psych: negative for feelings of anxiety, depression, mood changes    Past Medical History:   Diagnosis Date    Asthma     Diabetes ([address]     reports she is borderline diabetic     Hypertension     Kidney stone     Left ureteral stone     Renal colic on left side     Stroke ([facility] [address] 10/10/2022     Past Surgical History:   Procedure Laterality Date    HX COLONOSCOPY  01/22/2019    HX GASTRIC BYPASS  2012    HX KNEE REPLACEMENT Bilateral     2003, 2004    HX OTHER SURGICAL  09/06/2020    Kidney stones removed at [facility] in [facility], [address] [facility]    HX TUBAL LIGATION       Social History     Socioeconomic History    Marital status:    Occupational History    Occupation: [employer] - Part time     Comment: [employer]   Tobacco Use    Smoking status: Never    Smokeless tobacco: Never   Vaping Use    Vaping Use: Never used   Substance and Sexual Activity    Alcohol use: Yes     Comment: occasionally    Drug use: No     Family History   Problem Relation Age of Onset    Hypertension Mother     Obesity Mother     Hypertension Father     Diabetes Maternal Grandmother     Hypertension Maternal Grandmother        No Known Allergies    Objective:  Visit Vitals  /87 (BP 1 Location: Left upper arm, BP Patient Position: Sitting, BP Cuff Size: Large adult)   Pulse 77   Temp 98.2 °F (36.8 °C) (Temporal)   Resp 18   Ht 5' (1.524 m)   Wt 227 lb (103 kg)   BMI 44.33 kg/m²         Physical Exam:   General appearance - alert, obese. Pleasant  Mental status - alert, oriented to person, place, and time  EYE-EOMI  Mouth - mucous membranes moist  Neck - supple, no significant adenopathy   Chest - clear to auscultation, no wheezes, rales or rhonchi, symmetric air entry   Heart - normal rate, regular rhythm, normal S1, S2  Abdomen - soft, nontender, nondistended, no masses or organomegaly  Ext-obese  Skin-Warm and dry.  no hyperpigmentation, vitiligo, or suspicious lesions  Neuro -alert, oriented, normal speech, walks w cane        Results for orders placed or performed in visit on 96/76/63   METABOLIC PANEL, COMPREHENSIVE   Result Value Ref Range    Glucose 86 65 - 99 mg/dL    BUN 13 8 - 27 mg/dL    Creatinine 0.77 0.57 - 1.00 mg/dL    eGFR 84 >59 mL/min/1.73    BUN/Creatinine ratio 17 12 - 28    Sodium 143 134 - 144 mmol/L    Potassium 3.9 3.5 - 5.2 mmol/L    Chloride 102 96 - 106 mmol/L    CO2 24 20 - 29 mmol/L    Calcium 10.0 8.7 - 10.3 mg/dL    Protein, total 6.4 6.0 - 8.5 g/dL    Albumin 4.2 3.8 - 4.8 g/dL    GLOBULIN, TOTAL 2.2 1.5 - 4.5 g/dL    A-G Ratio 1.9 1.2 - 2.2    Bilirubin, total <0.2 0.0 - 1.2 mg/dL    Alk. phosphatase 121 44 - 121 IU/L    AST (SGOT) 26 0 - 40 IU/L    ALT (SGPT) 23 0 - 32 IU/L   LIPID PANEL   Result Value Ref Range    Cholesterol, total 181 100 - 199 mg/dL    Triglyceride 60 0 - 149 mg/dL    HDL Cholesterol 92 >39 mg/dL    VLDL, calculated 12 5 - 40 mg/dL    LDL, calculated 77 0 - 99 mg/dL   CBC WITH AUTOMATED DIFF   Result Value Ref Range    WBC 7.5 3.4 - 10.8 x10E3/uL    RBC 5.26 3.77 - 5.28 x10E6/uL    HGB 13.5 11.1 - 15.9 g/dL    HCT 40.9 34.0 - 46.6 %    MCV 78 (L) 79 - 97 fL    MCH 25.7 (L) 26.6 - 33.0 pg    MCHC 33.0 31.5 - 35.7 g/dL    RDW 13.7 11.7 - 15.4 %    PLATELET 657 361 - 053 x10E3/uL    NEUTROPHILS 60 Not Estab. %    Lymphocytes 32 Not Estab. %    MONOCYTES 8 Not Estab. %    EOSINOPHILS 0 Not Estab. %    BASOPHILS 0 Not Estab. %    ABS. NEUTROPHILS 4.4 1.4 - 7.0 x10E3/uL    Abs Lymphocytes 2.4 0.7 - 3.1 x10E3/uL    ABS. MONOCYTES 0.6 0.1 - 0.9 x10E3/uL    ABS. EOSINOPHILS 0.0 0.0 - 0.4 x10E3/uL    ABS. BASOPHILS 0.0 0.0 - 0.2 x10E3/uL    IMMATURE GRANULOCYTES 0 Not Estab. %    ABS. IMM.  GRANS. 0.0 0.0 - 0.1 x10E3/uL   THYROID CASCADE PROFILE   Result Value Ref Range    TSH 0.925 0.450 - 4.500 uIU/mL   HEMOGLOBIN A1C WITH EAG   Result Value Ref Range    Hemoglobin A1c 5.9 (H) 4.8 - 5.6 %    Estimated average glucose 123 mg/dL   CVD REPORT   Result Value Ref Range    INTERPRETATION Note        Assessment/Plan:    ICD-10-CM ICD-9-CM 1. Primary hypertension  Q35 712.0 METABOLIC PANEL, COMPREHENSIVE      LIPID PANEL      CBC WITH AUTOMATED DIFF      2. Generalized OA  M15.9 715.00       3. Bariatric surgery status  Z98.84 V45.86       4. Prediabetes  R73.03 790.29 HEMOGLOBIN A1C WITH EAG      5. Morbid obesity ([address]  E66.01 278.01       6. Encounter for hepatitis C screening test for low risk patient  Z11.59 V73.89 HEPATITIS C AB, RFLX TO QT BY PCR              Orders Placed This Encounter    METABOLIC PANEL, COMPREHENSIVE     Standing Status:   Future     Number of Occurrences:   1     Standing Expiration Date:   2/14/2024    LIPID PANEL     Standing Status:   Future     Number of Occurrences:   1     Standing Expiration Date:   2/14/2024    CBC WITH AUTOMATED DIFF     Standing Status:   Future     Number of Occurrences:   1     Standing Expiration Date:   2/14/2024    HEMOGLOBIN A1C WITH EAG     Standing Status:   Future     Number of Occurrences:   1     Standing Expiration Date:   2/14/2024    HEPATITIS C AB, RFLX TO QT BY PCR     Standing Status:   Future     Number of Occurrences:   1     Standing Expiration Date:   2/14/2024     1. Primary hypertension  Controlled on current regimen  - METABOLIC PANEL, COMPREHENSIVE; Future  - LIPID PANEL; Future  - CBC WITH AUTOMATED DIFF; Future    2. Generalized OA  Encourage wt loss and reg exercise    3. Bariatric surgery status  Noted    4. Prediabetes  Noted  - HEMOGLOBIN A1C WITH EAG; Future    5. Morbid obesity ([facility] [address]  D/w pt daily walking (at least 20 minutes), healthy diet w fruits and/or veggies w each meal, portion sizes and weight loss    6. Encounter for hepatitis C screening test for low risk patient    - HEPATITIS C AB, RFLX TO QT BY PCR; Future        There are no Patient Instructions on file for this visit. Follow-up and Dispositions    Return in about 6 months (around 8/14/2023).                  I have reviewed with the patient details of the assessment and plan and all questions were answered. Relevent patient education was performed. The most recent lab findings were reviewed with the patient. An After Visit Summary was printed and given to the patient.       Rosa Maria Jurado MD

## 2023-02-15 LAB
ALBUMIN SERPL-MCNC: 3.6 G/DL (ref 3.5–5)
ALBUMIN/GLOB SERPL: 1.1 (ref 1.1–2.2)
ALP SERPL-CCNC: 106 U/L (ref 45–117)
ALT SERPL-CCNC: 37 U/L (ref 12–78)
ANION GAP SERPL CALC-SCNC: 8 MMOL/L (ref 5–15)
AST SERPL-CCNC: 33 U/L (ref 15–37)
BASOPHILS # BLD: 0 K/UL (ref 0–0.1)
BASOPHILS NFR BLD: 0 % (ref 0–1)
BILIRUB SERPL-MCNC: 0.2 MG/DL (ref 0.2–1)
BUN SERPL-MCNC: 23 MG/DL (ref 6–20)
BUN/CREAT SERPL: 36 (ref 12–20)
CALCIUM SERPL-MCNC: 10.5 MG/DL (ref 8.5–10.1)
CHLORIDE SERPL-SCNC: 108 MMOL/L (ref 97–108)
CHOLEST SERPL-MCNC: 176 MG/DL
CO2 SERPL-SCNC: 26 MMOL/L (ref 21–32)
CREAT SERPL-MCNC: 0.64 MG/DL (ref 0.55–1.02)
DIFFERENTIAL METHOD BLD: ABNORMAL
EOSINOPHIL # BLD: 0 K/UL (ref 0–0.4)
EOSINOPHIL NFR BLD: 0 % (ref 0–7)
ERYTHROCYTE [DISTWIDTH] IN BLOOD BY AUTOMATED COUNT: 14.9 % (ref 11.5–14.5)
EST. AVERAGE GLUCOSE BLD GHB EST-MCNC: 111 MG/DL
GLOBULIN SER CALC-MCNC: 3.2 G/DL (ref 2–4)
GLUCOSE SERPL-MCNC: 92 MG/DL (ref 65–100)
HBA1C MFR BLD: 5.5 % (ref 4–5.6)
HCT VFR BLD AUTO: 40.3 % (ref 35–47)
HDLC SERPL-MCNC: 97 MG/DL
HDLC SERPL: 1.8 (ref 0–5)
HGB BLD-MCNC: 12.9 G/DL (ref 11.5–16)
IMM GRANULOCYTES # BLD AUTO: 0 K/UL (ref 0–0.04)
IMM GRANULOCYTES NFR BLD AUTO: 0 % (ref 0–0.5)
LDLC SERPL CALC-MCNC: 67.4 MG/DL (ref 0–100)
LYMPHOCYTES # BLD: 2.4 K/UL (ref 0.8–3.5)
LYMPHOCYTES NFR BLD: 27 % (ref 12–49)
MCH RBC QN AUTO: 25.6 PG (ref 26–34)
MCHC RBC AUTO-ENTMCNC: 32 G/DL (ref 30–36.5)
MCV RBC AUTO: 80.1 FL (ref 80–99)
MONOCYTES # BLD: 0.7 K/UL (ref 0–1)
MONOCYTES NFR BLD: 8 % (ref 5–13)
NEUTS SEG # BLD: 5.8 K/UL (ref 1.8–8)
NEUTS SEG NFR BLD: 65 % (ref 32–75)
NRBC # BLD: 0 K/UL (ref 0–0.01)
NRBC BLD-RTO: 0 PER 100 WBC
PLATELET # BLD AUTO: 195 K/UL (ref 150–400)
PMV BLD AUTO: 12.1 FL (ref 8.9–12.9)
POTASSIUM SERPL-SCNC: 4.2 MMOL/L (ref 3.5–5.1)
PROT SERPL-MCNC: 6.8 G/DL (ref 6.4–8.2)
RBC # BLD AUTO: 5.03 M/UL (ref 3.8–5.2)
SODIUM SERPL-SCNC: 142 MMOL/L (ref 136–145)
TRIGL SERPL-MCNC: 58 MG/DL (ref ?–150)
VLDLC SERPL CALC-MCNC: 11.6 MG/DL
WBC # BLD AUTO: 9 K/UL (ref 3.6–11)

## 2023-02-16 LAB
HCV AB SERPL QL IA: NORMAL
HCV IGG SERPL QL IA: NON REACTIVE

## 2023-02-21 ENCOUNTER — CLINICAL SUPPORT (OUTPATIENT)
Dept: SURGERY | Age: 68
End: 2023-02-21

## 2023-02-21 DIAGNOSIS — E66.01 MORBID OBESITY (HCC): Primary | ICD-10-CM

## 2023-02-21 NOTE — PROGRESS NOTES
[facility]  Metabolic Program Follow-up Nutrition Consult    Date: 2023   Physician: Karina Alvarado NP  Name: Bola Palacios  :  1955    Type of Plan: LCD  Weeks on Plan: 4 months  Virtual visit was completed through [facility]. ASSESSMENT:    Medications/Supplements:   Prior to Admission medications    Medication Sig Start Date End Date Taking? Authorizing Provider   guaiFENesin-codeine (ROBITUSSIN AC) 100-10 mg/5 mL solution Take 5 mL by mouth three (3) times daily as needed for Cough. 22   Provider, Historical   benzonatate (TESSALON) 100 mg capsule TAKE 1 CAPSULE BY MOUTH THREE TIMES DAILY AS NEEDED FOR COUGH 22   Provider, Historical   amLODIPine-benazepril (LOTREL) 5-20 mg per capsule Take 1 Capsule by mouth daily. 10/3/22   Jodee Tellez MD   cholecalciferol, vitamin D3, (Vitamin D3) 50 mcg (2,000 unit) tab Take 1 Tablet by mouth daily. 22   Jodee Tellez MD   atorvastatin (LIPITOR) 40 mg tablet Take 1 Tablet by mouth nightly. 22   Jodee Tellez MD   furosemide (LASIX) 20 mg tablet Take 20 mg by mouth daily. 3/2/22   Provider, Historical   aspirin 81 mg chewable tablet Take 81 mg by mouth daily. Kalyani Mclaughlin MD   ferrous sulfate 325 mg (65 mg iron) tablet Take 325 mg by mouth Daily (before breakfast). Kalyani Mclaughlin MD   calcium citrate/vitamin D3 (CALCIUM CITRATE + D PO) Take 3 Tablets by mouth nightly. Kalyani Mclaughlin MD   multivitamin, tx-iron-ca-min (THERA-M w/ IRON) 9 mg iron-400 mcg tab tablet Take 1 Tablet by mouth daily. Kalyani Mclaughlin MD   azelastine (ASTELIN) 137 mcg (0.1 %) nasal spray 2 Sprays by Nasal route two (2) times a day. Provider, Historical   mepolizumab (Nucala) 100 mg/mL syrg injection 100 mg by SubCUTAneous route every thirty (30) days. Provider, Historical   cyanocobalamin (VITAMIN B-12) 1,000 mcg sublingual tablet Take 1,000 mcg by mouth daily.     Provider, Historical   tiotropium bromide (SPIRIVA RESPIMAT) 1.25 mcg/actuation inhaler Take 2 Puffs by inhalation daily. Provider, Justino   fluticasone propionate (FLONASE) 50 mcg/actuation nasal spray 2 Sprays by Both Nostrils route daily as needed. Other, MD Kalyani   fluticasone furoate-vilanteroL (BREO ELLIPTA) 200-25 mcg/dose inhaler Take 1 Puff by inhalation daily. OtherKalyani MD   montelukast (SINGULAIR) 10 mg tablet Take 1 Tab by mouth nightly. 2/15/19   Vesna Early MD   albuterol (VENTOLIN HFA) 90 mcg/actuation inhaler Take 2 Puffs by inhalation every four (4) hours as needed for Wheezing. 17   Rick Gonzalez PA-C              Starting Weight: 239#   Current Weight: 225# (226# last visit one month ago)  Overall Pounds Lost: 14#  Overall Pounds Gained: 0    Exercise/Physical Activity:walking daily 10 -15 minutes in senior complex. Walking in a mall as backup when cold outside    Is patient using New Directions products:yes  If yes, how many per day: 2    Aversions/side effects of product/program:none    Fluids used to mix with products:water    Reported Diet History:sick with asthma being aggravated this last few weeks and brother  last week. Intake has been off track for this reason. Higher intake of chicken soup and crackers. Hunger not controlled. Heavy grazing in last week. Emotionally eating. Gets up around 5am and has eggo with coffee then eats again around 12pm.  Cares for grandson and may be tempted to eat his food as well. Beverages:     24 Hour Diet Recall  Breakfast  Coffee, eggo   Lunch 11-12pm [place]  [place] ([facility]) sandwich - wheat bread. Dinner  Lean cuisine or spaghetti   Snacks  ND shake, ginger snaps, ND protein bar   Beverages  Pepsi 7 ounces, 2 16.9 ounce bottles of water     Barriers/concerns preventing patient from achieving goal(s) since last visit: brother  last Saturday, very sad about this and stress eating.      NUTRITION INTERVENTION:  Pt educated on nutrition recommendations for the New Direction Low Calorie Plan (LCD), with the specific meal pattern of 2 meal replacements every day plus a grocery meal and snack. Daily recommended totals: 1200 calories, 60 grams carbs, 80+ grams protein, and remaining calories as healthy fats. Use LCD handout for meal and snack suggestions and preparation. Grocery meal:  Use the balanced plate method to plan meals, include 3-6 oz of lean source of protein, unlimited non-starchy vegetables, 1/2 cup whole grains/beans OR 1/2 cup fruit OR 1 serving of low fat dairy. Utilize handouts listing healthy snack and meal ideas. Read all nutrition labels. Demonstrated and emphasized identifying serving size, total fat, sugar and protein content. Defined low fat as </= 3 g per serving. Discussed lean and extra lean sources of protein. Avoid foods with sugar listed in the first 3 ingredients and >/10 g sugar per serving. Consume meal replacements every three to four hours or pattern as discussed with provider. Mix with water. May add herbs/spices for taste. Practice mindful eating habits; take small bites, chew thoroughly, avoid distractions, utilize hunger/fullness scale. Reviewed attendance policy of attending weekly nutrition classes. Metabolic support group recommended, and increase physical activity (approved per MD) for long term weight maintenance. NUTRITION MONITORING AND EVALUATION:1# loss x 30 days overall 14# loss x 4 months. She was sick and brother  since last visit, which explains understandably the stall in weight loss. Encouraged her to take it one day at time and focus on one goal a day if needed to prevent feeling overwhelmed. Increasing water and changing breakfast choices and timing may help. We discussed options below. Followup one month. The following goals were established with patient;  1) if must have eggo at 5:30am, put 1 T nut butter on it or pair with a boiled egg.   Also try premier shake alone or use as liquid to make 1/4 cup - 1/2 cup oatmeal.    2) after eating at 5:30am, then have a ND shake 9am - this will better curb cravings later mid day. Do not wait between 5:30am and noon to eat again, this is too long between nourishment causing low BGs and potential increase in cravings. 3) at lunch time ok to have a sandwich, try a half sandwich on carbmaster bread, or 647 bread, or a bfree wrap. Load up on veggBlack House. Skip lynne and use mustard or oil and vinegar. Pair with small serving of bagged salad and drizzle the dressing on it. Or have whole salad with tuna packet and Chaitanya dressing. Ok to have 1-2 pickles on the side. 4) 5-6pm - dinner, lean cuisine is ok, look for ones less than 450 mg Na per serving. Or try pre-prepared fresh meals from [facility] or [employer] if not wanting to make dinner. If choosing spaghetti - try pasta alternative such as zucchini noodles, cauliflower rice, lentil pasta, or shirataki mushroom noodles. Keep to tennis ball sized. 5) ND shake or ND bar after dinner PRN for hunger  6) 4-5 16.9 ounce bottles of water day   7) get the pepsi out of the house    Specific tips and techniques to facilitate compliance with above recommendations were provided and discussed. If further details are desired please contact me at [phone]. This phone number was also provided to the patient for any further questions or concerns.           Rama Bowers, MS, RD, LDN

## 2023-02-22 ENCOUNTER — OFFICE VISIT (OUTPATIENT)
Dept: SURGERY | Age: 68
End: 2023-02-22
Payer: MEDICARE

## 2023-02-22 VITALS
HEIGHT: 60 IN | DIASTOLIC BLOOD PRESSURE: 86 MMHG | TEMPERATURE: 98.2 F | SYSTOLIC BLOOD PRESSURE: 149 MMHG | BODY MASS INDEX: 44.17 KG/M2 | HEART RATE: 82 BPM | RESPIRATION RATE: 18 BRPM | WEIGHT: 225 LBS | OXYGEN SATURATION: 94 %

## 2023-02-22 DIAGNOSIS — I10 PRIMARY HYPERTENSION: ICD-10-CM

## 2023-02-22 DIAGNOSIS — E66.01 MORBID OBESITY (HCC): Primary | ICD-10-CM

## 2023-02-22 DIAGNOSIS — E78.5 HYPERLIPIDEMIA, UNSPECIFIED HYPERLIPIDEMIA TYPE: ICD-10-CM

## 2023-02-22 PROCEDURE — G8427 DOCREV CUR MEDS BY ELIG CLIN: HCPCS | Performed by: INTERNAL MEDICINE

## 2023-02-22 PROCEDURE — G8536 NO DOC ELDER MAL SCRN: HCPCS | Performed by: INTERNAL MEDICINE

## 2023-02-22 PROCEDURE — 99214 OFFICE O/P EST MOD 30 MIN: CPT | Performed by: INTERNAL MEDICINE

## 2023-02-22 PROCEDURE — 1101F PT FALLS ASSESS-DOCD LE1/YR: CPT | Performed by: INTERNAL MEDICINE

## 2023-02-22 PROCEDURE — 1090F PRES/ABSN URINE INCON ASSESS: CPT | Performed by: INTERNAL MEDICINE

## 2023-02-22 PROCEDURE — G8432 DEP SCR NOT DOC, RNG: HCPCS | Performed by: INTERNAL MEDICINE

## 2023-02-22 PROCEDURE — 3017F COLORECTAL CA SCREEN DOC REV: CPT | Performed by: INTERNAL MEDICINE

## 2023-02-22 PROCEDURE — G8400 PT W/DXA NO RESULTS DOC: HCPCS | Performed by: INTERNAL MEDICINE

## 2023-02-22 PROCEDURE — G9899 SCRN MAM PERF RSLTS DOC: HCPCS | Performed by: INTERNAL MEDICINE

## 2023-02-22 PROCEDURE — G8417 CALC BMI ABV UP PARAM F/U: HCPCS | Performed by: INTERNAL MEDICINE

## 2023-02-22 NOTE — PROGRESS NOTES
Identified pt with two pt identifiers (name and ). Reviewed chart in preparation for visit and have obtained necessary documentation. Trinidad Sol is a 69 y.o. female  Chief Complaint   Patient presents with    Weight Management     1 month follow up     Visit Vitals  BP (!) 149/86 (BP 1 Location: Left upper arm, BP Patient Position: Sitting, BP Cuff Size: Thigh)   Pulse 82   Temp 98.2 °F (36.8 °C) (Oral)   Resp 18   Ht 5' (1.524 m)   Wt 225 lb (102.1 kg)   SpO2 94%   BMI 43.94 kg/m²       1. Have you been to the ER, urgent care clinic since your last visit? Hospitalized since your last visit? No    2. Have you seen or consulted any other health care providers outside of the [address] [place] [address] since your last visit? Include any pap smears or colon screening. No    Patient and provider made aware of elevated BP x2. Patient asymptomatic. Patient reminded to monitor BP, continue to take BP medications if prescribed, and follow up with PCP/Cardiologist.  Patient expressed understanding and agreement.

## 2023-02-22 NOTE — PROGRESS NOTES
HISTORY OF PRESENT ILLNESS  Shobha Ling is a 69 y.o. female. Patient is an AA here for the medical weight center. Is not currently on medications. Is completing the LCD program and has been working with dietician. Reports that she has been snacking a lot . And sits at home a lot. Has been trying to get out with the complex and do more. Reports that she snacks because she has nothing else to do. Feels ok and with no complaints. Feels like it more to why she is not losing the weight. There is very little activity daily. Minimal walking   Is down another 3 lbs since last visit   Weight Management  Visit Vitals  BP (!) 149/86 (BP 1 Location: Left upper arm, BP Patient Position: Sitting, BP Cuff Size: Thigh)   Pulse 82   Temp 98.2 °F (36.8 °C) (Oral)   Resp 18   Ht 5' (1.524 m)   Wt 225 lb (102.1 kg)   SpO2 94%   BMI 43.94 kg/m²     Past Medical History:   Diagnosis Date    Asthma     Diabetes ([address]     reports she is borderline diabetic     Hypertension     Kidney stone     Left ureteral stone     Renal colic on left side     Stroke ([facility] 10/10/2022     Past Surgical History:   Procedure Laterality Date    HX COLONOSCOPY  01/22/2019    HX GASTRIC BYPASS  2012    HX KNEE REPLACEMENT Bilateral     2003, 2004    HX OTHER SURGICAL  09/06/2020    Kidney stones removed at [facility] in [place] [address] [address] [facility]    HX TUBAL LIGATION       Family History   Problem Relation Age of Onset    Hypertension Mother     Obesity Mother     Hypertension Father     Diabetes Maternal Grandmother     Hypertension Maternal Grandmother      Outpatient Encounter Medications as of 2/22/2023   Medication Sig Dispense Refill    amLODIPine-benazepril (LOTREL) 5-20 mg per capsule Take 1 Capsule by mouth daily. 60 Capsule 2    cholecalciferol, vitamin D3, (Vitamin D3) 50 mcg (2,000 unit) tab Take 1 Tablet by mouth daily. 90 Tablet 5    atorvastatin (LIPITOR) 40 mg tablet Take 1 Tablet by mouth nightly.  90 Tablet 1    furosemide (LASIX) 20 mg tablet Take 20 mg by mouth daily. aspirin 81 mg chewable tablet Take 81 mg by mouth daily. ferrous sulfate 325 mg (65 mg iron) tablet Take 325 mg by mouth Daily (before breakfast). calcium citrate/vitamin D3 (CALCIUM CITRATE + D PO) Take 3 Tablets by mouth nightly. multivitamin, tx-iron-ca-min (THERA-M w/ IRON) 9 mg iron-400 mcg tab tablet Take 1 Tablet by mouth daily. azelastine (ASTELIN) 137 mcg (0.1 %) nasal spray 2 Sprays by Nasal route two (2) times a day. mepolizumab (Nucala) 100 mg/mL syrg injection 100 mg by SubCUTAneous route every thirty (30) days. cyanocobalamin (VITAMIN B-12) 1,000 mcg sublingual tablet Take 1,000 mcg by mouth daily. tiotropium bromide (SPIRIVA RESPIMAT) 1.25 mcg/actuation inhaler Take 2 Puffs by inhalation daily. fluticasone propionate (FLONASE) 50 mcg/actuation nasal spray 2 Sprays by Both Nostrils route daily as needed. fluticasone furoate-vilanteroL (BREO ELLIPTA) 200-25 mcg/dose inhaler Take 1 Puff by inhalation daily. montelukast (SINGULAIR) 10 mg tablet Take 1 Tab by mouth nightly. 30 Tab 2    albuterol (VENTOLIN HFA) 90 mcg/actuation inhaler Take 2 Puffs by inhalation every four (4) hours as needed for Wheezing. 1 Inhaler 0    [DISCONTINUED] guaiFENesin-codeine (ROBITUSSIN AC) 100-10 mg/5 mL solution Take 5 mL by mouth three (3) times daily as needed for Cough. (Patient not taking: Reported on 2/22/2023)      [DISCONTINUED] benzonatate (TESSALON) 100 mg capsule TAKE 1 CAPSULE BY MOUTH THREE TIMES DAILY AS NEEDED FOR COUGH (Patient not taking: Reported on 2/22/2023)       No facility-administered encounter medications on file as of 2/22/2023. Review of Systems   Constitutional:  Positive for weight gain. Respiratory:  Negative for cough and hemoptysis. Cardiovascular: Negative. Gastrointestinal: Negative. Musculoskeletal:  Positive for joint pain. Neurological: Negative. Psychiatric/Behavioral: Negative. Physical Exam  Vitals and nursing note reviewed. Constitutional:       Appearance: She is obese. Cardiovascular:      Rate and Rhythm: Normal rate and regular rhythm. Pulmonary:      Effort: Pulmonary effort is normal.      Breath sounds: Normal breath sounds. Skin:     General: Skin is warm. Neurological:      Mental Status: She is alert and oriented to person, place, and time. Psychiatric:         Behavior: Behavior normal.       ASSESSMENT and PLAN  Diagnoses and all orders for this visit:    1. Morbid obesity ([address]     -  patient to remain on LCD. Patient was suggested to stop the snacking and get involved more with her community for stimulation   2. Hyperlipidemia, unspecified hyperlipidemia type    3.  Primary hypertension      Follow-up and Dispositions    Return in about 1 month (around 3/22/2023), or if symptoms worsen or fail to improve.       lab results and schedule of future lab studies reviewed with patient  reviewed diet, exercise and weight control  cardiovascular risk and specific lipid/LDL goals reviewed  reviewed medications and side effects in detail

## 2023-02-22 NOTE — PROGRESS NOTES
WEEK 1    Progress Note: Weekly Education Class in the [facility]         Patient is on Very Low Calorie Diet [] (4 meal replacements per day, 800 kcal/day)      Low Calorie Diet [x] (2-3 meal replacements per day, 9335-3328 kcal/day)    1) Did patient have any new symptoms or physical problems? Yes []    No [x]    If yes, check & comment: weakness [], fatigue [], lightheadedness [], headache [], cramps [], cold intolerance [], hair loss [], diarrhea [], constipation [],  NA [] other:                                 2) Has patient had any medical attention from other providers, urgent care or the emergency room this week? Yes [x]  No []       NA [], If yes, why: Check up with PCP                                      3) Any other sugar sweetened beverages consumed this week? Yes [x]  No []    4) Did patient have any problems adhering to the diet? Yes [x]  No [] NA []    If yes, Vacation [], Celebrations [], Conferences [], Family Reunions [x] other:                                                5) How many hours of sleep this week? 42.5    (range)  NA []    Number of meal replacements consumed daily? 18 (range)  NA []    Average ounces of water patient consumed daily this week (not including shakes)? 37     (divide the weekly total by 7)    Did you eat any food outside of the program? Yes [x] No []    Physical Activity Over the Past Week:    Cardio exercise: 0 min  Strength exercise: 0 workouts / week  Number of steps walked per day: 3817    How has patient mood overall been this week? Sad [], Happy [], Stressed [], Tired [x], Content [x], NA [], other            Medications reconciled by nurse Yes [x]  No[]    Patient was given therapeutic recommendations for any noted side effects of their dietary approach based upon [facility] patient manual per providers recommendation.

## 2023-02-22 NOTE — PROGRESS NOTES
[id number]        Progress Note: Weekly Education Class in the [facility]         Patient is on Very Low Calorie Diet [] (4 meal replacements per day, 800 kcal/day)      Low Calorie Diet [x] (2-3 meal replacements per day, 9679-1403 kcal/day)    1) Did patient have any new symptoms or physical problems? Yes []    No [x]    If yes, check & comment: weakness [], fatigue [], lightheadedness [], headache [], cramps [], cold intolerance [], hair loss [], diarrhea [], constipation [],  NA [] other:                                2) Has patient had any medical attention from other providers, urgent care or the emergency room this week? Yes [x]  No []       NA [], If yes, why: Check up with Dr Rossie Cabot (asthma)                                      3) Any other sugar sweetened beverages consumed this week? Yes [x]  No []    4) Did patient have any problems adhering to the diet? Yes [x]  No [] NA []    If yes, Vacation [], Celebrations [], Conferences [], Family Reunions [x] other: no will power                                               5) How many hours of sleep this week? 14    (range)  NA []    Number of meal replacements consumed daily? 5 (range)  NA []    Average ounces of water patient consumed daily this week (not including shakes)? 43     (divide the weekly total by 7)    Did you eat any food outside of the program? Yes [x] No []    Physical Activity Over the Past Week:    Cardio exercise: 0 min  Strength exercise: 0 workouts / week  Number of steps walked per day: 5364    How has patient mood overall been this week? Sad [], Happy [], Stressed [], Tired [x], Content [], NA [], other            Medications reconciled by nurse Yes [x]  No[]    Patient was given therapeutic recommendations for any noted side effects of their dietary approach based upon [facility] patient manual per providers recommendation.

## 2023-03-02 ENCOUNTER — OFFICE VISIT (OUTPATIENT)
Dept: SURGERY | Age: 68
End: 2023-03-02

## 2023-03-02 DIAGNOSIS — E66.01 MORBID OBESITY (HCC): Primary | ICD-10-CM

## 2023-03-03 NOTE — PROGRESS NOTES
[facility]  Metabolic Weight Loss Program        Patient's Name: Jamarcus Townsend  : 1955    This patient is a participant at [address] [place] [address] [facility] Management Center and attended the weekly virtual nutrition class hosted via [address] [place] [address] today.       Serge Cuellar, MS, RD, LDN

## 2023-03-08 ENCOUNTER — CLINICAL SUPPORT (OUTPATIENT)
Dept: SURGERY | Age: 68
End: 2023-03-08

## 2023-03-08 VITALS
HEIGHT: 60 IN | SYSTOLIC BLOOD PRESSURE: 142 MMHG | WEIGHT: 224.3 LBS | HEART RATE: 68 BPM | BODY MASS INDEX: 44.04 KG/M2 | OXYGEN SATURATION: 99 % | DIASTOLIC BLOOD PRESSURE: 90 MMHG | RESPIRATION RATE: 18 BRPM | TEMPERATURE: 97.7 F

## 2023-03-08 DIAGNOSIS — E66.01 MORBID OBESITY (HCC): Primary | ICD-10-CM

## 2023-03-08 NOTE — PROGRESS NOTES
Weight Management. 1. Have you been to the ER, urgent care clinic since your last visit? Hospitalized since your last visit? No    2. Have you seen or consulted any other health care providers outside of the [address] [place] [address] since your last visit? Include any pap smears or colon screening. No    Patient and provider made aware of elevated BP x2. Patient asymptomatic. Patient reminded to monitor BP, continue to take BP medications if prescribed, and follow up with PCP/Cardiologist.  Patient expressed understanding and agreement.

## 2023-03-08 NOTE — PROGRESS NOTES
Progress Note: Weekly Education Class in the [facility]         Patient is on Very Low Calorie Diet [] (4 meal replacements per day, 800 kcal/day)      Low Calorie Diet [x] (2-3 meal replacements per day, 5971-7336 kcal/day)    1) Did patient have any new symptoms or physical problems? Yes []    No [x]    If yes, check & comment: weakness [], fatigue [], lightheadedness [], headache [], cramps [], cold intolerance [], hair loss [], diarrhea [], constipation [],  NA [] other:                                 2) Has patient had any medical attention from other providers, urgent care or the emergency room this week? Yes []  No [x]       NA [], If yes, why:                                       3) Any other sugar sweetened beverages consumed this week? Yes [x]  No []    4) Did patient have any problems adhering to the diet? Yes [x]  No [] NA []    If yes, Vacation [], Celebrations [], Conferences [], Family Reunions [] other: funerals                                               5) How many hours of sleep this week? 4.5-7.5    (range)  NA []    Number of meal replacements consumed daily? 1-2 (range)  NA []    Average ounces of water patient consumed daily this week (not including shakes)? 33     (divide the weekly total by 7)    Did you eat any food outside of the program? Yes [x] No []    Physical Activity Over the Past Week:    Cardio exercise: 0 min  Strength exercise: 0 workouts / week  Number of steps walked per day: 9887-8919    How has patient mood overall been this week? Sad [], Happy [], Stressed [], Tired [], Content [], NA [], other   NOT ANSWERED             Medications reconciled by nurse Yes [x]  No[]    Patient was given therapeutic recommendations for any noted side effects of their dietary approach based upon [facility] patient manual per providers recommendation.

## 2023-03-21 ENCOUNTER — CLINICAL SUPPORT (OUTPATIENT)
Dept: SURGERY | Age: 68
End: 2023-03-21

## 2023-03-21 ENCOUNTER — OFFICE VISIT (OUTPATIENT)
Dept: SURGERY | Age: 68
End: 2023-03-21

## 2023-03-21 DIAGNOSIS — E66.01 MORBID OBESITY (HCC): Primary | ICD-10-CM

## 2023-03-21 NOTE — PROGRESS NOTES
[facility]  Metabolic Program Follow-up Nutrition Consult    Date: 3/21/2023   Physician: Roque Kellogg NP  Name: Romy Collier  :  1955    Type of Plan: LCD  Weeks on Plan: 5 months  Virtual visit was completed through [address] [place] [address]. ASSESSMENT:    Medications/Supplements:   Prior to Admission medications    Medication Sig Start Date End Date Taking? Authorizing Provider   amLODIPine-benazepril (LOTREL) 5-20 mg per capsule Take 1 Capsule by mouth daily. 10/3/22   Zaheer Garrett MD   cholecalciferol, vitamin D3, (Vitamin D3) 50 mcg (2,000 unit) tab Take 1 Tablet by mouth daily. 22   Zaheer Garrett MD   atorvastatin (LIPITOR) 40 mg tablet Take 1 Tablet by mouth nightly. 22   Zaheer Garrett MD   furosemide (LASIX) 20 mg tablet Take 20 mg by mouth daily. 3/2/22   Provider, Historical   aspirin 81 mg chewable tablet Take 81 mg by mouth daily. OtherKalyani MD   ferrous sulfate 325 mg (65 mg iron) tablet Take 325 mg by mouth Daily (before breakfast). Kalyani Mclaughlin MD   calcium citrate/vitamin D3 (CALCIUM CITRATE + D PO) Take 3 Tablets by mouth nightly. Kalyani Mclaughlin MD   multivitamin, tx-iron-ca-min (THERA-M w/ IRON) 9 mg iron-400 mcg tab tablet Take 1 Tablet by mouth daily. Kalyani Mclaughlin MD   azelastine (ASTELIN) 137 mcg (0.1 %) nasal spray 2 Sprays by Nasal route two (2) times a day. Provider, Historical   mepolizumab (Nucala) 100 mg/mL syrg injection 100 mg by SubCUTAneous route every thirty (30) days. Provider, Historical   cyanocobalamin (VITAMIN B-12) 1,000 mcg sublingual tablet Take 1,000 mcg by mouth daily. Provider, Historical   tiotropium bromide (SPIRIVA RESPIMAT) 1.25 mcg/actuation inhaler Take 2 Puffs by inhalation daily. Provider, Historical   fluticasone propionate (FLONASE) 50 mcg/actuation nasal spray 2 Sprays by Both Nostrils route daily as needed.     Other, MD Kalyani   fluticasone furoate-vilanteroL (BREO ELLIPTA) 200-25 mcg/dose inhaler Take 1 Puff by inhalation daily. Kalyani Mclaughlin MD   montelukast (SINGULAIR) 10 mg tablet Take 1 Tab by mouth nightly. 2/15/19   Dmitry Bill MD   albuterol (VENTOLIN HFA) 90 mcg/actuation inhaler Take 2 Puffs by inhalation every four (4) hours as needed for Wheezing. 17   Remy Jaramillo PA-C              Starting Weight: 239#   Current Weight: 224# (225# last visit one month ago)  Overall Pounds Lost: 15#  Overall Pounds Gained: 0    Exercise/Physical Activity:not reported, but typically walks [place]. Is patient using New Directions products:no  If yes, how many per day:na    Aversions/side effects of product/program:na    Fluids used to mix with products:na    Reported Diet History:not hungry, off track, not focusing on herself since passing of her brother and father in the last month. No structured meals or snacks, walks through kitchen and grabs bites of food, usually refined carbs. Up at 6am, coffee 2 cups toast butter and boiled egg    Beverages: coffee, one 16.9 ounce bottle of water. Was up to 3 a day prior to deaths in family. 3-4 mini pepsi every day     24 Hour Diet Recall  Breakfast  none   Lunch  drumstick   Dinner  Bologna sandwich   Snacks  Small bag pretzels, ginger snaps, ND bar   Beverages  Pepsi, coffee, 1 bottle of water     Barriers/concerns preventing patient from achieving goal(s) since last visit:brother  last month, a week later her father . NUTRITION INTERVENTION:  Pt educated on nutrition recommendations for the New Direction Low Calorie Plan (LCD), with the specific meal pattern of 2 meal replacements every day plus a grocery meal and snack. Daily recommended totals: 1200 calories, 60 grams carbs, 80+ grams protein, and remaining calories as healthy fats. Use LCD handout for meal and snack suggestions and preparation.       Grocery meal:  Use the balanced plate method to plan meals, include 3-6 oz of lean source of protein, unlimited non-starchy vegetables, 1/2 cup whole grains/beans OR 1/2 cup fruit OR 1 serving of low fat dairy. Utilize handouts listing healthy snack and meal ideas. Read all nutrition labels. Demonstrated and emphasized identifying serving size, total fat, sugar and protein content. Defined low fat as </= 3 g per serving. Discussed lean and extra lean sources of protein. Avoid foods with sugar listed in the first 3 ingredients and >/10 g sugar per serving. Consume meal replacements every three to four hours or pattern as discussed with provider. Mix with water. May add herbs/spices for taste. Practice mindful eating habits; take small bites, chew thoroughly, avoid distractions, utilize hunger/fullness scale. Reviewed attendance policy of attending weekly nutrition classes. Metabolic support group recommended, and increase physical activity (approved per MD) for long term weight maintenance. NUTRITION MONITORING AND EVALUATION:  15# loss x 5 months with 1# loss x last 30 days. Death of her brother last month and death of her father last week have caused her to not be able to focus on herself, understandably. Wants to get back on track today. Not doing the meal replacements. Created a few very specific goals to prevent feeling overwhelmed per patient's request. Followup one month    The following goals were established with patient;  1) Increase back to three 16.9 ounce bottles of water today. Do this every day for next 7 days. Then increase to four 16.9 ounce bottles of water thereafter. 2)wean down to 1 mini pepsi a day only, then eventually zero  3) 1/2 packet with 4-5 ounces 1-2  - munchy crunchy   4) put veggies on sandwich  5) green giant frozen veggies with Mrs. Nino, have with sandwich      Specific tips and techniques to facilitate compliance with above recommendations were provided and discussed. If further details are desired please contact me at [phone].   This phone number was also provided to the patient for any further questions or concerns.           Ryan Morgan, MS, RD, LDN

## 2023-03-21 NOTE — PROGRESS NOTES
[facility]  Metabolic Weight Loss Program        Patient's Name: Juany Combs  : 1955    This patient is a participant at [address] [place] [address] [facility] and attended the weekly virtual nutrition class hosted via [facility].       Meredith Tejeda MS, RD, LDN

## 2023-03-22 ENCOUNTER — OFFICE VISIT (OUTPATIENT)
Dept: SURGERY | Age: 68
End: 2023-03-22
Payer: MEDICARE

## 2023-03-22 VITALS
SYSTOLIC BLOOD PRESSURE: 124 MMHG | OXYGEN SATURATION: 98 % | HEART RATE: 72 BPM | WEIGHT: 223.4 LBS | TEMPERATURE: 98.2 F | RESPIRATION RATE: 18 BRPM | HEIGHT: 60 IN | BODY MASS INDEX: 43.86 KG/M2 | DIASTOLIC BLOOD PRESSURE: 87 MMHG

## 2023-03-22 DIAGNOSIS — E66.01 MORBID OBESITY (HCC): Primary | ICD-10-CM

## 2023-03-22 DIAGNOSIS — F43.21 FEELING GRIEF: ICD-10-CM

## 2023-03-22 DIAGNOSIS — E78.5 HYPERLIPIDEMIA, UNSPECIFIED HYPERLIPIDEMIA TYPE: ICD-10-CM

## 2023-03-22 DIAGNOSIS — I10 PRIMARY HYPERTENSION: ICD-10-CM

## 2023-03-22 PROCEDURE — G9899 SCRN MAM PERF RSLTS DOC: HCPCS | Performed by: INTERNAL MEDICINE

## 2023-03-22 PROCEDURE — G8536 NO DOC ELDER MAL SCRN: HCPCS | Performed by: INTERNAL MEDICINE

## 2023-03-22 PROCEDURE — 1101F PT FALLS ASSESS-DOCD LE1/YR: CPT | Performed by: INTERNAL MEDICINE

## 2023-03-22 PROCEDURE — G8432 DEP SCR NOT DOC, RNG: HCPCS | Performed by: INTERNAL MEDICINE

## 2023-03-22 PROCEDURE — G8417 CALC BMI ABV UP PARAM F/U: HCPCS | Performed by: INTERNAL MEDICINE

## 2023-03-22 PROCEDURE — 3017F COLORECTAL CA SCREEN DOC REV: CPT | Performed by: INTERNAL MEDICINE

## 2023-03-22 PROCEDURE — G8400 PT W/DXA NO RESULTS DOC: HCPCS | Performed by: INTERNAL MEDICINE

## 2023-03-22 PROCEDURE — 1090F PRES/ABSN URINE INCON ASSESS: CPT | Performed by: INTERNAL MEDICINE

## 2023-03-22 PROCEDURE — 99214 OFFICE O/P EST MOD 30 MIN: CPT | Performed by: INTERNAL MEDICINE

## 2023-03-22 PROCEDURE — G8427 DOCREV CUR MEDS BY ELIG CLIN: HCPCS | Performed by: INTERNAL MEDICINE

## 2023-03-22 NOTE — PROGRESS NOTES
Progress Note: Weekly Education Class in the [facility]         Patient is on Very Low Calorie Diet [] (4 meal replacements per day, 800 kcal/day)      Low Calorie Diet [x] (2-3 meal replacements per day, 9703-1657 kcal/day)    1) Did patient have any new symptoms or physical problems? Yes []    No [x]    If yes, check & comment: weakness [], fatigue [], lightheadedness [], headache [], cramps [], cold intolerance [], hair loss [], diarrhea [], constipation [],  NA [] other:                                  2) Has patient had any medical attention from other providers, urgent care or the emergency room this week? Yes []  No [x]       NA [], If yes, why:                                        3) Any other sugar sweetened beverages consumed this week? Yes [x]  No []    4) Did patient have any problems adhering to the diet? Yes  X  No [] NA []    If yes, Vacation [], Celebrations [], Conferences [], Family Reunions [] other: death in the family                                               5) How many hours of sleep this week? 5-7    (range)  NA []    Number of meal replacements consumed daily? 1-2 (range)  NA []    Average ounces of water patient consumed daily this week (not including shakes)? 26     (divide the weekly total by 7)    Did you eat any food outside of the program? Yes [] No []    Physical Activity Over the Past Week:    Cardio exercise: 0 min  Strength exercise: 0 workouts / week  Number of steps walked per day: 85328    How has patient mood overall been this week? Sad [], Happy [], Stressed [], Tired [], Content [], NA [x], other             Medications reconciled by nurse Yes [x]  No[]    Patient was given therapeutic recommendations for any noted side effects of their dietary approach based upon [facility] patient manual per providers recommendation.

## 2023-03-22 NOTE — PROGRESS NOTES
Weight Management. 1 month follow up. 1. Have you been to the ER, urgent care clinic since your last visit? Hospitalized since your last visit? No    2. Have you seen or consulted any other health care providers outside of the [address] [place] [address] since your last visit? Include any pap smears or colon screening. No    BMI - 43.4    Patient and provider made aware of elevated BP x2. Patient asymptomatic. Patient reminded to monitor BP, continue to take BP medications if prescribed, and follow up with PCP/Cardiologist.  Patient expressed understanding and agreement.

## 2023-03-22 NOTE — PROGRESS NOTES
[address] [facility] [place]   HISTORY OF PRESENT ILLNESS  Shai Madrid is a 69 y.o. female. Patient was seen for a follow up at the [facility] weight center. Is an AA. Lost her brother and and her dad in the last month unexpectedly. Has not been sticking with the program during the time. Wants to be back on track. Has gone back to drinking soda and overeating. Was working on getting moving more before this all happened. Wants to get back on the LCD. Has spoken to her dietician and reviewed her plan. Not on any medications for weight. PMH of HLD, HTN and CVA  Visit Vitals  /87 (BP 1 Location: Left arm, BP Patient Position: Sitting, BP Cuff Size: Large adult)   Pulse 72   Temp 98.2 °F (36.8 °C) (Oral)   Resp 18   Ht 5' (1.524 m)   Wt 223 lb 6.4 oz (101.3 kg)   SpO2 98%   BMI 43.63 kg/m²     Past Medical History:   Diagnosis Date    Asthma     Diabetes ([facility] [address]     reports she is borderline diabetic     Hypertension     Kidney stone     Left ureteral stone     Renal colic on left side     Stroke ([facility] [address] 10/10/2022     Past Surgical History:   Procedure Laterality Date    HX COLONOSCOPY  01/22/2019    HX GASTRIC BYPASS  2012    HX KNEE REPLACEMENT Bilateral     2003, 2004    HX OTHER SURGICAL  09/06/2020    Kidney stones removed at [facility] in [place], [place]    HX TUBAL LIGATION       Family History   Problem Relation Age of Onset    Hypertension Mother     Obesity Mother     Hypertension Father     Diabetes Maternal Grandmother     Hypertension Maternal Grandmother      Outpatient Encounter Medications as of 3/22/2023   Medication Sig Dispense Refill    amLODIPine-benazepril (LOTREL) 5-20 mg per capsule Take 1 Capsule by mouth daily. 60 Capsule 2    cholecalciferol, vitamin D3, (Vitamin D3) 50 mcg (2,000 unit) tab Take 1 Tablet by mouth daily. 90 Tablet 5    atorvastatin (LIPITOR) 40 mg tablet Take 1 Tablet by mouth nightly.  90 Tablet 1    furosemide (LASIX) 20 mg tablet Take 20 mg by mouth daily.      aspirin 81 mg chewable tablet Take 81 mg by mouth daily. ferrous sulfate 325 mg (65 mg iron) tablet Take 325 mg by mouth Daily (before breakfast). calcium citrate/vitamin D3 (CALCIUM CITRATE + D PO) Take 3 Tablets by mouth nightly. multivitamin, tx-iron-ca-min (THERA-M w/ IRON) 9 mg iron-400 mcg tab tablet Take 1 Tablet by mouth daily. azelastine (ASTELIN) 137 mcg (0.1 %) nasal spray 2 Sprays by Nasal route two (2) times a day. mepolizumab (Nucala) 100 mg/mL syrg injection 100 mg by SubCUTAneous route every thirty (30) days. cyanocobalamin (VITAMIN B-12) 1,000 mcg sublingual tablet Take 1,000 mcg by mouth daily. tiotropium bromide (SPIRIVA RESPIMAT) 1.25 mcg/actuation inhaler Take 2 Puffs by inhalation daily. fluticasone propionate (FLONASE) 50 mcg/actuation nasal spray 2 Sprays by Both Nostrils route daily as needed. fluticasone furoate-vilanteroL (BREO ELLIPTA) 200-25 mcg/dose inhaler Take 1 Puff by inhalation daily. montelukast (SINGULAIR) 10 mg tablet Take 1 Tab by mouth nightly. 30 Tab 2    albuterol (VENTOLIN HFA) 90 mcg/actuation inhaler Take 2 Puffs by inhalation every four (4) hours as needed for Wheezing. 1 Inhaler 0     No facility-administered encounter medications on file as of 3/22/2023. Weight Management  Pertinent negatives include no chest pain. Review of Systems   Constitutional:  Positive for weight gain. Negative for chills and fever. Respiratory: Negative. Cardiovascular:  Negative for chest pain and palpitations. Gastrointestinal: Negative. Musculoskeletal:  Positive for joint pain. Neurological: Negative. Psychiatric/Behavioral:  Positive for depression. Physical Exam  Vitals and nursing note reviewed. Constitutional:       Appearance: She is obese. Cardiovascular:      Rate and Rhythm: Normal rate and regular rhythm.    Pulmonary:      Effort: Pulmonary effort is normal.      Breath sounds: Normal breath sounds. Abdominal:      Palpations: Abdomen is soft. Skin:     General: Skin is warm. Neurological:      Mental Status: She is alert and oriented to person, place, and time. Psychiatric:         Mood and Affect: Mood normal.       ASSESSMENT and PLAN  Diagnoses and all orders for this visit:    1. Morbid obesity ([address]      -  going to get back on the LCD and is working on this with the dietician   2. Hyperlipidemia, unspecified hyperlipidemia type    3. Primary hypertension    4.  Feeling grief      -  has the support of her family and coping well right now     Follow-up and Dispositions    Return in about 1 month (around 4/22/2023), or if symptoms worsen or fail to improve.       lab results and schedule of future lab studies reviewed with patient  reviewed diet, exercise and weight control  reviewed medications and side effects in detail

## 2023-04-03 ENCOUNTER — CLINICAL SUPPORT (OUTPATIENT)
Dept: SURGERY | Age: 68
End: 2023-04-03

## 2023-04-03 VITALS
RESPIRATION RATE: 20 BRPM | TEMPERATURE: 98.1 F | DIASTOLIC BLOOD PRESSURE: 86 MMHG | OXYGEN SATURATION: 96 % | SYSTOLIC BLOOD PRESSURE: 125 MMHG | WEIGHT: 221.5 LBS | HEIGHT: 60 IN | BODY MASS INDEX: 43.49 KG/M2 | HEART RATE: 65 BPM

## 2023-04-03 DIAGNOSIS — E66.01 MORBID OBESITY (HCC): Primary | ICD-10-CM

## 2023-04-19 ENCOUNTER — CLINICAL SUPPORT (OUTPATIENT)
Dept: SURGERY | Age: 68
End: 2023-04-19

## 2023-04-19 DIAGNOSIS — E66.01 MORBID OBESITY (HCC): Primary | ICD-10-CM

## 2023-04-19 NOTE — PROGRESS NOTES
[facility]  Metabolic Program Follow-up Nutrition Consult    Date: 2023   Physician: Joann Galan  Name: Ardie Severs  :  1955    Type of Plan: LCD  Weeks on Plan: 6 months  Virtual visit was completed through [address] [place] [address]. ASSESSMENT:    Medications/Supplements:   Prior to Admission medications    Medication Sig Start Date End Date Taking? Authorizing Provider   amLODIPine-benazepril (LOTREL) 5-20 mg per capsule TAKE 1 CAPSULE BY MOUTH DAILY 3/28/23   Todd Perdue MD   atorvastatin (LIPITOR) 40 mg tablet TAKE 1 TABLET BY MOUTH EVERY NIGHT 3/22/23   Todd Perdue MD   cholecalciferol, vitamin D3, (Vitamin D3) 50 mcg (2,000 unit) tab Take 1 Tablet by mouth daily. 22   Todd Perdue MD   furosemide (LASIX) 20 mg tablet Take 20 mg by mouth daily. 3/2/22   Provider, Historical   aspirin 81 mg chewable tablet Take 81 mg by mouth daily. Kalyani Mclaughlin MD   ferrous sulfate 325 mg (65 mg iron) tablet Take 325 mg by mouth Daily (before breakfast). Kalyani Mclaughlin MD   calcium citrate/vitamin D3 (CALCIUM CITRATE + D PO) Take 3 Tablets by mouth nightly. Kalyani Mclaughlin MD   multivitamin, tx-iron-ca-min (THERA-M w/ IRON) 9 mg iron-400 mcg tab tablet Take 1 Tablet by mouth daily. Kalyani Mclaughlin MD   azelastine (ASTELIN) 137 mcg (0.1 %) nasal spray 2 Sprays by Nasal route two (2) times a day. Provider, Historical   mepolizumab (Nucala) 100 mg/mL syrg injection 100 mg by SubCUTAneous route every thirty (30) days. Provider, Historical   cyanocobalamin (VITAMIN B-12) 1,000 mcg sublingual tablet Take 1,000 mcg by mouth daily. Provider, Historical   tiotropium bromide (SPIRIVA RESPIMAT) 1.25 mcg/actuation inhaler Take 2 Puffs by inhalation daily. Provider, Historical   fluticasone propionate (FLONASE) 50 mcg/actuation nasal spray 2 Sprays by Both Nostrils route daily as needed.     Kalyani Mclaughlin MD   fluticasone furoate-vilanteroL (BREO ELLIPTA) 200-25 mcg/dose inhaler Take 1 Puff by inhalation daily. Arnoldo, MD Kalyani   montelukast (SINGULAIR) 10 mg tablet Take 1 Tab by mouth nightly. 2/15/19   iLzz Lomeli MD   albuterol (VENTOLIN HFA) 90 mcg/actuation inhaler Take 2 Puffs by inhalation every four (4) hours as needed for Wheezing. 12/28/17   Wolf Zhou PA-C              Starting Weight: 239#   Current Weight: 221#  Overall Pounds Lost: 18#  Overall Pounds Gained: 0    Exercise/Physical Activity:gym started back yesterday, son-in-law is  2x week    Is patient using New Directions products:yes  If yes, how many per day:2    Aversions/side effects of product/program:no    Fluids used to mix with products:water    Reported Diet History:  Skips meals: no  Fast food:no  Cooks at home: yes  Meal choices: lean cuisine 3 x week. Incorporating fish. Try salmon. Snacks: animal crackers, tangerines, ND bar  Beverages:soda back 2 mini cans a day, coffee 3 splenda and dab milk, water intake down  To bed 8pm, up 5am    24 Hour Diet Recall  Breakfast 5:30a, 8am ND shake; Scrambled egg, 1 slice toast, 46.8 oz water   Lunch 12p ND shake   Dinner 6pm Chicken leg baked, corn 1/2 c, kalyn greens 1 c   Snacks  none   Beverages  Water,coffee,pepsi     Barriers/concerns preventing patient from achieving goal(s) since last visit:soda, death of father and brother    NUTRITION INTERVENTION:  Pt educated on nutrition recommendations for the New Direction Low Calorie Plan (LCD), with the specific meal pattern of 2 meal replacements every day plus a grocery meal and snack. Daily recommended totals: 1200 calories, 60 grams carbs, 80+ grams protein, and remaining calories as healthy fats. Use LCD handout for meal and snack suggestions and preparation.     Grocery meal:  Use the balanced plate method to plan meals, include 3-6 oz of lean source of protein, unlimited non-starchy vegetables, 1/2 cup whole grains/beans OR 1/2 cup fruit OR 1 serving of low fat dairy. Utilize handouts listing healthy snack and meal ideas. Read all nutrition labels. Demonstrated and emphasized identifying serving size, total fat, sugar and protein content. Defined low fat as </= 3 g per serving. Discussed lean and extra lean sources of protein. Avoid foods with sugar listed in the first 3 ingredients and >/10 g sugar per serving. Consume meal replacements every three to four hours or pattern as discussed with provider. Mix with water. May add herbs/spices for taste. Practice mindful eating habits; take small bites, chew thoroughly, avoid distractions, utilize hunger/fullness scale. Reviewed attendance policy of attending weekly nutrition classes. Metabolic support group recommended, and increase physical activity (approved per MD) for long term weight maintenance. NUTRITION MONITORING AND EVALUATION:  18# loss/gain x 6 months. Meeting most nutrition goals. Reviewed the following goals below. Followup one month. The following goals were established with patient;  1) no more soda  2) increase water  3) try salmon in air fryer 390 degrees 7-10 minutes  4) limit starch to 1/2 c on plate      Specific tips and techniques to facilitate compliance with above recommendations were provided and discussed. If further details are desired please contact me at [phone]. This phone number was also provided to the patient for any further questions or concerns.           Nigel Castro, MS, RD, LDN

## 2023-04-26 ENCOUNTER — OFFICE VISIT (OUTPATIENT)
Dept: SURGERY | Age: 68
End: 2023-04-26
Payer: MEDICARE

## 2023-04-26 VITALS
OXYGEN SATURATION: 95 % | SYSTOLIC BLOOD PRESSURE: 127 MMHG | BODY MASS INDEX: 42.9 KG/M2 | DIASTOLIC BLOOD PRESSURE: 84 MMHG | HEART RATE: 66 BPM | WEIGHT: 218.5 LBS | TEMPERATURE: 97.8 F | RESPIRATION RATE: 20 BRPM | HEIGHT: 60 IN

## 2023-04-26 DIAGNOSIS — E78.5 HYPERLIPIDEMIA, UNSPECIFIED HYPERLIPIDEMIA TYPE: ICD-10-CM

## 2023-04-26 DIAGNOSIS — E66.01 MORBID OBESITY (HCC): Primary | ICD-10-CM

## 2023-04-26 PROCEDURE — 1090F PRES/ABSN URINE INCON ASSESS: CPT | Performed by: INTERNAL MEDICINE

## 2023-04-26 PROCEDURE — G8536 NO DOC ELDER MAL SCRN: HCPCS | Performed by: INTERNAL MEDICINE

## 2023-04-26 PROCEDURE — G8417 CALC BMI ABV UP PARAM F/U: HCPCS | Performed by: INTERNAL MEDICINE

## 2023-04-26 PROCEDURE — G9899 SCRN MAM PERF RSLTS DOC: HCPCS | Performed by: INTERNAL MEDICINE

## 2023-04-26 PROCEDURE — 1101F PT FALLS ASSESS-DOCD LE1/YR: CPT | Performed by: INTERNAL MEDICINE

## 2023-04-26 PROCEDURE — G8427 DOCREV CUR MEDS BY ELIG CLIN: HCPCS | Performed by: INTERNAL MEDICINE

## 2023-04-26 PROCEDURE — 99214 OFFICE O/P EST MOD 30 MIN: CPT | Performed by: INTERNAL MEDICINE

## 2023-04-26 PROCEDURE — G8432 DEP SCR NOT DOC, RNG: HCPCS | Performed by: INTERNAL MEDICINE

## 2023-04-26 PROCEDURE — G8400 PT W/DXA NO RESULTS DOC: HCPCS | Performed by: INTERNAL MEDICINE

## 2023-04-26 PROCEDURE — 3017F COLORECTAL CA SCREEN DOC REV: CPT | Performed by: INTERNAL MEDICINE

## 2023-04-26 NOTE — PROGRESS NOTES
[address] [facility]     HISTORY OF PRESENT ILLNESS  Kalpana Adkins is a 69 y.o. female. Patient was seen for  a follow up at the medical weight center. Is down 5 lbs since last visit. Reports that she got back to being on the LCD. Has continued to drink a pepsi everyday, but is trying to weans this back. Has also began going out with her friends in the community and walking a lot. Has been to the gym twice. Is not currently on any medications. Her sleep has been around 6 hours nightly. Reviewed her eating habits with me. Drinks coffee in the AM, slice a toast, a shake by 9, lunch by 1 (shake) and for dinner a saucer worth of food. But tells me she is hungry again by 8. Sometimes she will also have a Lean Cuisine at lunch if she continues to be hungry.    Visit Vitals  /84 (BP 1 Location: Left upper arm, BP Patient Position: Sitting, BP Cuff Size: Large adult)   Pulse 66   Temp 97.8 °F (36.6 °C) (Oral)   Resp 20   Ht 5' (1.524 m)   Wt 218 lb 8 oz (99.1 kg)   SpO2 95%   BMI 42.67 kg/m²     Past Medical History:   Diagnosis Date    Asthma     Diabetes ([address]     reports she is borderline diabetic     Hypertension     Kidney stone     Left ureteral stone     Renal colic on left side     Stroke ([facility] [address] 10/10/2022     Past Surgical History:   Procedure Laterality Date    HX COLONOSCOPY  01/22/2019    HX GASTRIC BYPASS  2012    HX KNEE REPLACEMENT Bilateral     2003, 2004    HX OTHER SURGICAL  09/06/2020    Kidney stones removed at [facility] in [address] TUBAL LIGATION       Family History   Problem Relation Age of Onset    Hypertension Mother     Obesity Mother     Hypertension Father     Diabetes Maternal Grandmother     Hypertension Maternal Grandmother      Outpatient Encounter Medications as of 4/26/2023   Medication Sig Dispense Refill    amLODIPine-benazepril (LOTREL) 5-20 mg per capsule TAKE 1 CAPSULE BY MOUTH DAILY 90 Capsule 3    atorvastatin (LIPITOR) 40 mg tablet TAKE 1 TABLET BY MOUTH EVERY NIGHT 90 Tablet 3    cholecalciferol, vitamin D3, (Vitamin D3) 50 mcg (2,000 unit) tab Take 1 Tablet by mouth daily. 90 Tablet 5    furosemide (LASIX) 20 mg tablet Take 1 Tablet by mouth daily. aspirin 81 mg chewable tablet Take 1 Tablet by mouth daily. ferrous sulfate 325 mg (65 mg iron) tablet Take 1 Tablet by mouth Daily (before breakfast). calcium citrate/vitamin D3 (CALCIUM CITRATE + D PO) Take 3 Tablets by mouth nightly. multivitamin, tx-iron-ca-min (THERA-M w/ IRON) 9 mg iron-400 mcg tab tablet Take 1 Tablet by mouth daily. azelastine (ASTELIN) 137 mcg (0.1 %) nasal spray 2 Sprays by Nasal route two (2) times a day. mepolizumab (Nucala) 100 mg/mL syrg injection 100 mg by SubCUTAneous route every thirty (30) days. cyanocobalamin (VITAMIN B-12) 1,000 mcg sublingual tablet Take 1 Tablet by mouth daily. tiotropium bromide (SPIRIVA RESPIMAT) 1.25 mcg/actuation inhaler Take 2 Puffs by inhalation daily. fluticasone propionate (FLONASE) 50 mcg/actuation nasal spray 2 Sprays by Both Nostrils route daily as needed. fluticasone furoate-vilanteroL (BREO ELLIPTA) 200-25 mcg/dose inhaler Take 1 Puff by inhalation daily. montelukast (SINGULAIR) 10 mg tablet Take 1 Tab by mouth nightly. 30 Tab 2    albuterol (VENTOLIN HFA) 90 mcg/actuation inhaler Take 2 Puffs by inhalation every four (4) hours as needed for Wheezing. 1 Inhaler 0     No facility-administered encounter medications on file as of 4/26/2023. Weight Management  Pertinent negatives include no headaches. Review of Systems   Constitutional:  Positive for weight gain. Respiratory: Negative. Cardiovascular: Negative. Gastrointestinal: Negative. Musculoskeletal:  Positive for joint pain. Neurological:  Negative for dizziness and headaches. Physical Exam  Vitals and nursing note reviewed. Cardiovascular:      Rate and Rhythm: Normal rate and regular rhythm. Pulmonary:      Breath sounds: Normal breath sounds. Abdominal:      Palpations: Abdomen is soft. Skin:     General: Skin is warm. Neurological:      Mental Status: She is alert and oriented to person, place, and time. Psychiatric:         Behavior: Behavior normal.       ASSESSMENT and PLAN  Diagnoses and all orders for this visit:    1. Morbid obesity ([address]      -   continue on LCD. Suggested that patient add fruit, or a scoop of peanut butter to shake for fullness. Also suggested eating less frozen meals and vegetables when wanting to snack. -   encouraged her to get moving daily for at least 30 min. Water intake should at least be 72 oz daily. Reduce/stop the pepsi intake   2.  Hyperlipidemia, unspecified hyperlipidemia type      Follow-up and Dispositions    Return in about 1 month (around 5/26/2023), or if symptoms worsen or fail to improve.       lab results and schedule of future lab studies reviewed with patient  reviewed diet, exercise and weight control  reviewed medications and side effects in detail

## 2023-04-26 NOTE — PROGRESS NOTES
Identified pt with two pt identifiers (name and ). Reviewed chart in preparation for visit and have obtained necessary documentation. Abdullahi Mccoy is a 69 y.o. female  Chief Complaint   Patient presents with    Weight Management     1 Month Follow Up, [place]   BMI42.5    Visit Vitals  /84 (BP 1 Location: Left upper arm, BP Patient Position: Sitting, BP Cuff Size: Large adult)   Pulse 66   Temp 97.8 °F (36.6 °C) (Oral)   Resp 20   Ht 5' (1.524 m)   Wt 218 lb 8 oz (99.1 kg)   SpO2 95%   BMI 42.67 kg/m²       1. Have you been to the ER, urgent care clinic since your last visit? Hospitalized since your last visit? No    2. Have you seen or consulted any other health care providers outside of the [address] [place] [address] since your last visit? Include any pap smears or colon screening. No       Progress Note: Weekly Education Class in the [facility] Loss Program         Patient is on Very Low Calorie Diet [] (4 meal replacements per day, 800 kcal/day)      Low Calorie Diet [x] (2-3 meal replacements per day, 4763-5641 kcal/day)    1) Did patient have any new symptoms or physical problems? Yes []    No [x]    If yes, check & comment: weakness [], fatigue [], lightheadedness [], headache [], cramps [], cold intolerance [], hair loss [], diarrhea [], constipation [],  NA [] other:                                  2) Has patient had any medical attention from other providers, urgent care or the emergency room this week? Yes []  No [x]       NA [], If yes, why:                                        3) Any other sugar sweetened beverages consumed this week? Yes [x]  No []    4) Did patient have any problems adhering to the diet? Yes [x]  No [] NA []    If yes, Vacation [], Celebrations [], Conferences [], Family Reunions [] other: no will power                                               5) How many hours of sleep this week?  4-7    (range)  NA []    Number of meal replacements consumed daily? 1-2 (range)  NA []    Average ounces of water patient consumed daily this week (not including shakes)? 25     (divide the weekly total by 7)    Did you eat any food outside of the program? Yes [] No [x]    Physical Activity Over the Past Week:    Cardio exercise: 0 min  Strength exercise: 0 workouts / week  Number of steps walked per day: [phone]    How has patient mood overall been this week? Sad [], Happy [], Stressed [], Tired [], Content [], NA [x], other             Medications reconciled by nurse Yes [x]  No[]    Patient was given therapeutic recommendations for any noted side effects of their dietary approach based upon [facility] patient manual per providers recommendation. Progress Note: Weekly Education Class in the [facility] Program         Patient is on Very Low Calorie Diet [] (4 meal replacements per day, 800 kcal/day)      Low Calorie Diet [x] (2-3 meal replacements per day, 3770-9979 kcal/day)    1) Did patient have any new symptoms or physical problems? Yes []    No [x]    If yes, check & comment: weakness [], fatigue [], lightheadedness [], headache [], cramps [], cold intolerance [], hair loss [], diarrhea [], constipation [],  NA [] other:                                  2) Has patient had any medical attention from other providers, urgent care or the emergency room this week? Yes []  No [x]       NA [], If yes, why:                                        3) Any other sugar sweetened beverages consumed this week? Yes [x]  No []    4) Did patient have any problems adhering to the diet? Yes [x]  No [] NA []    If yes, Vacation [], Celebrations [], Conferences [], Family Reunions [] other: no will power                                               5) How many hours of sleep this week? 6-8    (range)  NA []    Number of meal replacements consumed daily? 1-2 (range)  NA []    Average ounces of water patient consumed daily this week (not including shakes)?  28 (divide the weekly total by 7)    Did you eat any food outside of the program? Yes [x] No []    Physical Activity Over the Past Week:    Cardio exercise: 0 min  Strength exercise: 0 workouts / week  Number of steps walked per day: [phone]    How has patient mood overall been this week? Sad [], Happy [], Stressed [], Tired [], Content [], NA [x], other 0           Medications reconciled by nurse Yes [x]  No[]    Patient was given therapeutic recommendations for any noted side effects of their dietary approach based upon [facility] patient manual per providers recommendation.

## 2023-05-10 ENCOUNTER — NURSE ONLY (OUTPATIENT)
Age: 68
End: 2023-05-10

## 2023-05-10 VITALS
WEIGHT: 218.9 LBS | DIASTOLIC BLOOD PRESSURE: 86 MMHG | HEART RATE: 64 BPM | OXYGEN SATURATION: 98 % | RESPIRATION RATE: 18 BRPM | TEMPERATURE: 97.8 F | BODY MASS INDEX: 42.98 KG/M2 | HEIGHT: 60 IN | SYSTOLIC BLOOD PRESSURE: 132 MMHG

## 2023-05-10 DIAGNOSIS — E66.01 MORBID (SEVERE) OBESITY DUE TO EXCESS CALORIES (HCC): Primary | ICD-10-CM

## 2023-05-10 RX ORDER — TIOTROPIUM BROMIDE INHALATION SPRAY 1.56 UG/1
2 SPRAY, METERED RESPIRATORY (INHALATION) DAILY
COMMUNITY

## 2023-05-10 RX ORDER — ASPIRIN 81 MG/1
81 TABLET, CHEWABLE ORAL DAILY
COMMUNITY

## 2023-05-10 RX ORDER — FERROUS SULFATE 325(65) MG
325 TABLET ORAL
COMMUNITY

## 2023-05-10 RX ORDER — MEPOLIZUMAB 100 MG/ML
100 INJECTION, SOLUTION SUBCUTANEOUS
COMMUNITY

## 2023-05-10 RX ORDER — AZELASTINE 1 MG/ML
2 SPRAY, METERED NASAL 2 TIMES DAILY
COMMUNITY

## 2023-05-10 RX ORDER — UBIDECARENONE 75 MG
1000 CAPSULE ORAL DAILY
COMMUNITY

## 2023-05-10 RX ORDER — ALBUTEROL SULFATE 2.5 MG/3ML
2.5 SOLUTION RESPIRATORY (INHALATION)
COMMUNITY

## 2023-05-10 RX ORDER — ATORVASTATIN CALCIUM 40 MG/1
40 TABLET, FILM COATED ORAL NIGHTLY
COMMUNITY
Start: 2023-03-22

## 2023-05-10 RX ORDER — PHENOL 1.4 %
1 AEROSOL, SPRAY (ML) MUCOUS MEMBRANE NIGHTLY
COMMUNITY

## 2023-05-10 RX ORDER — FLUTICASONE PROPIONATE 50 MCG
2 SPRAY, SUSPENSION (ML) NASAL DAILY PRN
COMMUNITY

## 2023-05-10 RX ORDER — ALBUTEROL SULFATE 90 UG/1
2 AEROSOL, METERED RESPIRATORY (INHALATION) EVERY 4 HOURS PRN
COMMUNITY
Start: 2017-12-28

## 2023-05-10 RX ORDER — MONTELUKAST SODIUM 10 MG/1
10 TABLET ORAL NIGHTLY
COMMUNITY
Start: 2019-02-15

## 2023-05-10 RX ORDER — FUROSEMIDE 20 MG/1
20 TABLET ORAL DAILY
COMMUNITY
Start: 2023-04-07

## 2023-05-10 RX ORDER — FLUTICASONE FUROATE AND VILANTEROL 200; 25 UG/1; UG/1
1 POWDER RESPIRATORY (INHALATION) DAILY
COMMUNITY

## 2023-05-10 RX ORDER — CHOLECALCIFEROL (VITAMIN D3) 125 MCG
1 CAPSULE ORAL DAILY
COMMUNITY
Start: 2023-03-21

## 2023-05-10 RX ORDER — AMLODIPINE BESYLATE AND BENAZEPRIL HYDROCHLORIDE 5; 20 MG/1; MG/1
1 CAPSULE ORAL DAILY
COMMUNITY
Start: 2023-03-28

## 2023-05-10 ASSESSMENT — PATIENT HEALTH QUESTIONNAIRE - PHQ9
SUM OF ALL RESPONSES TO PHQ9 QUESTIONS 1 & 2: 0
1. LITTLE INTEREST OR PLEASURE IN DOING THINGS: 0
SUM OF ALL RESPONSES TO PHQ QUESTIONS 1-9: 0
2. FEELING DOWN, DEPRESSED OR HOPELESS: 0

## 2023-05-22 ENCOUNTER — OFFICE VISIT (OUTPATIENT)
Age: 68
End: 2023-05-22

## 2023-05-22 DIAGNOSIS — E66.01 MORBID (SEVERE) OBESITY DUE TO EXCESS CALORIES (HCC): Primary | ICD-10-CM

## 2023-05-22 NOTE — PROGRESS NOTES
lean source of protein, unlimited non-starchy vegetables, 1/2 cup whole grains/beans OR 1/2 cup fruit OR 1 serving of low fat dairy. Utilize handouts listing healthy snack and meal ideas. Read all nutrition labels. Demonstrated and emphasized identifying serving size, total fat, sugar and protein content. Defined low fat as </= 3 g per serving. Discussed lean and extra lean sources of protein. Avoid foods with sugar listed in the first 3 ingredients and >/10 g sugar per serving. Consume meal replacements every three to four hours or pattern as discussed with provider. Mix with water. May add herbs/spices for taste. Practice mindful eating habits; take small bites, chew thoroughly, avoid distractions, utilize hunger/fullness scale. Reviewed attendance policy of attending weekly nutrition classes. Metabolic support group recommended, and increase physical activity (approved per MD) for long term weight maintenance. NUTRITION MONITORING AND EVALUATION: 21# loss x 7  months. Meeting nutrition goals 3 out of 7 days a week. Feels off track some days,  likes sweets and fried food. Great job on exercise! Discussed goals below and reviewed LCD plan. Pt remains motivated,adherence likely. Followup one month. The following goals were established with patient;  1) no pepsi, or at least limit to one a day to start this week to prevent no caffeine headaches. 2) increase to four 16.9 ounce bottles of water a day, limit to two of them having sf  flavor enhancers such as carol drops or crystal light. 3) have 2 ND products a day. Have a ND shake when get off the phone. No skipping  4) throw away extra birthday cake  5) increase exercise to 300 minutes a week as tolerated. Specific tips and techniques to facilitate compliance with above recommendations were provided and discussed. If further details are desired please contact me at [phone].   This phone number was also provided to the

## 2023-05-31 ENCOUNTER — OFFICE VISIT (OUTPATIENT)
Age: 68
End: 2023-05-31
Payer: MEDICARE

## 2023-05-31 VITALS
DIASTOLIC BLOOD PRESSURE: 87 MMHG | RESPIRATION RATE: 16 BRPM | SYSTOLIC BLOOD PRESSURE: 132 MMHG | HEIGHT: 60 IN | BODY MASS INDEX: 42.88 KG/M2 | OXYGEN SATURATION: 98 % | TEMPERATURE: 98.2 F | HEART RATE: 73 BPM | WEIGHT: 218.4 LBS

## 2023-05-31 DIAGNOSIS — E66.01 MORBID (SEVERE) OBESITY DUE TO EXCESS CALORIES (HCC): Primary | ICD-10-CM

## 2023-05-31 DIAGNOSIS — I10 ESSENTIAL (PRIMARY) HYPERTENSION: ICD-10-CM

## 2023-05-31 PROCEDURE — G8417 CALC BMI ABV UP PARAM F/U: HCPCS | Performed by: INTERNAL MEDICINE

## 2023-05-31 PROCEDURE — 99214 OFFICE O/P EST MOD 30 MIN: CPT | Performed by: INTERNAL MEDICINE

## 2023-05-31 PROCEDURE — 3017F COLORECTAL CA SCREEN DOC REV: CPT | Performed by: INTERNAL MEDICINE

## 2023-05-31 PROCEDURE — G8400 PT W/DXA NO RESULTS DOC: HCPCS | Performed by: INTERNAL MEDICINE

## 2023-05-31 PROCEDURE — 3075F SYST BP GE 130 - 139MM HG: CPT | Performed by: INTERNAL MEDICINE

## 2023-05-31 PROCEDURE — G8427 DOCREV CUR MEDS BY ELIG CLIN: HCPCS | Performed by: INTERNAL MEDICINE

## 2023-05-31 PROCEDURE — 1036F TOBACCO NON-USER: CPT | Performed by: INTERNAL MEDICINE

## 2023-05-31 PROCEDURE — 1090F PRES/ABSN URINE INCON ASSESS: CPT | Performed by: INTERNAL MEDICINE

## 2023-05-31 PROCEDURE — 3079F DIAST BP 80-89 MM HG: CPT | Performed by: INTERNAL MEDICINE

## 2023-05-31 PROCEDURE — 1123F ACP DISCUSS/DSCN MKR DOCD: CPT | Performed by: INTERNAL MEDICINE

## 2023-05-31 ASSESSMENT — PATIENT HEALTH QUESTIONNAIRE - PHQ9
SUM OF ALL RESPONSES TO PHQ QUESTIONS 1-9: 0
SUM OF ALL RESPONSES TO PHQ QUESTIONS 1-9: 0
SUM OF ALL RESPONSES TO PHQ9 QUESTIONS 1 & 2: 0
2. FEELING DOWN, DEPRESSED OR HOPELESS: 0
SUM OF ALL RESPONSES TO PHQ QUESTIONS 1-9: 0
1. LITTLE INTEREST OR PLEASURE IN DOING THINGS: 0
SUM OF ALL RESPONSES TO PHQ QUESTIONS 1-9: 0

## 2023-05-31 ASSESSMENT — ENCOUNTER SYMPTOMS
GASTROINTESTINAL NEGATIVE: 1
RESPIRATORY NEGATIVE: 1

## 2023-05-31 NOTE — PROGRESS NOTES
Progress Note: Weekly Education Class in the [facility] Weight Loss Program         Patient is on Very Low Calorie Diet [] (4 meal replacements per day, 800 kcal/day)      Low Calorie Diet [x] (2-3 meal replacements per day, 6924-6032 kcal/day)    1) Did patient have any new symptoms or physical problems? Yes []    No [x]    If yes, check & comment: weakness [], fatigue [], lightheadedness [], headache [], cramps [], cold intolerance [], hair loss [], diarrhea [], constipation [],  NA [] other:                                 2) Has patient had any medical attention from other providers, urgent care or the emergency room this week? Yes []  No [x]       NA [], If yes, why:                                       3) Any other sugar sweetened beverages consumed this week? Yes [x]  No []    4) Did patient have any problems adhering to the diet? Yes []  No [x] NA []    If yes, Vacation [], Celebrations [x], Conferences [], Family Reunions [] other:                                                5) How many hours of sleep this week? 4-7    (range)  NA []    Number of meal replacements consumed daily? 1-3 (range)  NA []    Average ounces of water patient consumed daily this week (not including shakes)? 27     (divide the weekly total by 7)    Did you eat any food outside of the program? Yes [] No [x]    Physical Activity Over the Past Week:    Cardio exercise: 15 min  Strength exercise: 1 workouts / week  Number of steps walked per day: [phone]    How has patient mood overall been this week? Sad [], Happy [], Stressed [], Tired [], Content [], NA [], other NOT ANSWERED             Medications reconciled by nurse Yes [x]  No[]    Patient was given therapeutic recommendations for any noted side effects of their dietary approach based upon [facility] patient manual per providers recommendation.

## 2023-05-31 NOTE — PROGRESS NOTES
[facility]     Subjective    Roma Sheldon is a 69 y.o. female who presents today for the following: patient was seen for a follow up at the [facility] weight center. Patient reports that she has been keeping herself buy and away with family and going on day trips to keep her busy. Has not had the best diet due to being on the go, but is down 2 lbs. Reports that she feels like she is going to get back on track. Has been doing the LCD program majority of her time with us. Is not on any medications to help with weight. Her sleep has been around 6 hours or more a night. Still drinking one sugar free pepsi daily. Chief Complaint   Patient presents with    Weight Management           PMH/PSH/Allergies/Social History/medication list and most recent studies reviewed with patient. reports that she has never smoked. She has never used smokeless tobacco.    reports current alcohol use.      Vitals:    05/31/23 0901   BP: 132/87   Pulse: 73   Resp: 16   Temp: 98.2 °F (36.8 °C)   SpO2: 98%      Past Medical History:   Diagnosis Date    Asthma     Diabetes ([facility] [address]     reports she is borderline diabetic     Hypertension     Kidney stone     Left ureteral stone     Renal colic on left side     Stroke ([facility] [address] 10/10/2022       No Known Allergies     Current Outpatient Medications on File Prior to Visit   Medication Sig Dispense Refill    albuterol sulfate HFA (PROVENTIL;VENTOLIN;PROAIR) 108 (90 Base) MCG/ACT inhaler Inhale 2 puffs into the lungs every 4 hours as needed      albuterol (PROVENTIL) (2.5 MG/3ML) 0.083% nebulizer solution Inhale 3 mLs into the lungs      amLODIPine-benazepril (LOTREL) 5-20 MG per capsule Take 1 capsule by mouth daily      atorvastatin (LIPITOR) 40 MG tablet Take 1 tablet by mouth nightly      aspirin 81 MG chewable tablet Take 1 tablet by mouth daily      Cholecalciferol (VITAMIN D3) 50 MCG (2000 UT) TABS Take 1 tablet by mouth daily      furosemide (LASIX) 20 MG tablet

## 2023-05-31 NOTE — PROGRESS NOTES
Identified pt with two pt identifiers (name and ). Reviewed chart in preparation for visit and have obtained necessary documentation. Haylee Martinez is a 69 y.o. female  Chief Complaint   Patient presents with    Weight Management   1 month follow up. /87 (Site: Right Upper Arm, Position: Sitting, Cuff Size: Large Adult)   Pulse 73   Temp 98.2 °F (36.8 °C) (Oral)   Resp 16   Ht 5' (1.524 m)   Wt 218 lb 6.4 oz (99.1 kg)   SpO2 98%   BMI 42.65 kg/m²     1. Have you been to the ER, urgent care clinic since your last visit? Hospitalized since your last visit?no    2. Have you seen or consulted any other health care providers outside of the [address] [place] [address] since your last visit? Include any pap smears or colon screening.  Yes, [facility] for shingles vaccines & TB test.    BMI - 42.4 Waiting to schedule

## 2023-06-14 ENCOUNTER — NURSE ONLY (OUTPATIENT)
Age: 68
End: 2023-06-14

## 2023-06-14 VITALS
BODY MASS INDEX: 40.67 KG/M2 | HEART RATE: 62 BPM | OXYGEN SATURATION: 91 % | RESPIRATION RATE: 16 BRPM | DIASTOLIC BLOOD PRESSURE: 85 MMHG | SYSTOLIC BLOOD PRESSURE: 137 MMHG | WEIGHT: 215.4 LBS | TEMPERATURE: 97.6 F | HEIGHT: 61 IN

## 2023-06-14 DIAGNOSIS — E66.01 MORBID OBESITY (HCC): Primary | ICD-10-CM

## 2023-06-14 ASSESSMENT — PATIENT HEALTH QUESTIONNAIRE - PHQ9
SUM OF ALL RESPONSES TO PHQ9 QUESTIONS 1 & 2: 0
SUM OF ALL RESPONSES TO PHQ QUESTIONS 1-9: 0
SUM OF ALL RESPONSES TO PHQ QUESTIONS 1-9: 0
1. LITTLE INTEREST OR PLEASURE IN DOING THINGS: 0
2. FEELING DOWN, DEPRESSED OR HOPELESS: 0
SUM OF ALL RESPONSES TO PHQ QUESTIONS 1-9: 0
SUM OF ALL RESPONSES TO PHQ QUESTIONS 1-9: 0

## 2023-06-26 ENCOUNTER — OFFICE VISIT (OUTPATIENT)
Age: 68
End: 2023-06-26

## 2023-06-26 DIAGNOSIS — E66.01 MORBID OBESITY (HCC): Primary | ICD-10-CM

## 2023-06-28 ENCOUNTER — OFFICE VISIT (OUTPATIENT)
Age: 68
End: 2023-06-28
Payer: MEDICARE

## 2023-06-28 VITALS
HEART RATE: 61 BPM | RESPIRATION RATE: 18 BRPM | HEIGHT: 61 IN | WEIGHT: 215.7 LBS | TEMPERATURE: 98 F | OXYGEN SATURATION: 98 % | BODY MASS INDEX: 40.72 KG/M2 | DIASTOLIC BLOOD PRESSURE: 88 MMHG | SYSTOLIC BLOOD PRESSURE: 136 MMHG

## 2023-06-28 DIAGNOSIS — E78.5 HYPERLIPIDEMIA, UNSPECIFIED HYPERLIPIDEMIA TYPE: ICD-10-CM

## 2023-06-28 DIAGNOSIS — I10 PRIMARY HYPERTENSION: ICD-10-CM

## 2023-06-28 DIAGNOSIS — E66.01 MORBID OBESITY (HCC): Primary | ICD-10-CM

## 2023-06-28 PROCEDURE — 3017F COLORECTAL CA SCREEN DOC REV: CPT | Performed by: INTERNAL MEDICINE

## 2023-06-28 PROCEDURE — 99214 OFFICE O/P EST MOD 30 MIN: CPT | Performed by: INTERNAL MEDICINE

## 2023-06-28 PROCEDURE — 1090F PRES/ABSN URINE INCON ASSESS: CPT | Performed by: INTERNAL MEDICINE

## 2023-06-28 PROCEDURE — 1036F TOBACCO NON-USER: CPT | Performed by: INTERNAL MEDICINE

## 2023-06-28 PROCEDURE — 1123F ACP DISCUSS/DSCN MKR DOCD: CPT | Performed by: INTERNAL MEDICINE

## 2023-06-28 PROCEDURE — 3075F SYST BP GE 130 - 139MM HG: CPT | Performed by: INTERNAL MEDICINE

## 2023-06-28 PROCEDURE — G8417 CALC BMI ABV UP PARAM F/U: HCPCS | Performed by: INTERNAL MEDICINE

## 2023-06-28 PROCEDURE — G8427 DOCREV CUR MEDS BY ELIG CLIN: HCPCS | Performed by: INTERNAL MEDICINE

## 2023-06-28 PROCEDURE — 3079F DIAST BP 80-89 MM HG: CPT | Performed by: INTERNAL MEDICINE

## 2023-06-28 PROCEDURE — G8400 PT W/DXA NO RESULTS DOC: HCPCS | Performed by: INTERNAL MEDICINE

## 2023-06-28 ASSESSMENT — PATIENT HEALTH QUESTIONNAIRE - PHQ9
SUM OF ALL RESPONSES TO PHQ QUESTIONS 1-9: 0
SUM OF ALL RESPONSES TO PHQ QUESTIONS 1-9: 0
SUM OF ALL RESPONSES TO PHQ9 QUESTIONS 1 & 2: 0
SUM OF ALL RESPONSES TO PHQ QUESTIONS 1-9: 0
2. FEELING DOWN, DEPRESSED OR HOPELESS: 0
SUM OF ALL RESPONSES TO PHQ QUESTIONS 1-9: 0
1. LITTLE INTEREST OR PLEASURE IN DOING THINGS: 0

## 2023-06-28 ASSESSMENT — ENCOUNTER SYMPTOMS
RESPIRATORY NEGATIVE: 1
GASTROINTESTINAL NEGATIVE: 1

## 2023-07-12 ENCOUNTER — NURSE ONLY (OUTPATIENT)
Age: 68
End: 2023-07-12

## 2023-07-12 VITALS
DIASTOLIC BLOOD PRESSURE: 82 MMHG | BODY MASS INDEX: 41.07 KG/M2 | RESPIRATION RATE: 20 BRPM | TEMPERATURE: 97.7 F | HEIGHT: 61 IN | WEIGHT: 217.5 LBS | HEART RATE: 66 BPM | OXYGEN SATURATION: 98 % | SYSTOLIC BLOOD PRESSURE: 118 MMHG

## 2023-07-12 DIAGNOSIS — E66.01 MORBID OBESITY (HCC): Primary | ICD-10-CM

## 2023-07-12 ASSESSMENT — PATIENT HEALTH QUESTIONNAIRE - PHQ9
SUM OF ALL RESPONSES TO PHQ QUESTIONS 1-9: 0
1. LITTLE INTEREST OR PLEASURE IN DOING THINGS: 0
2. FEELING DOWN, DEPRESSED OR HOPELESS: 0
SUM OF ALL RESPONSES TO PHQ QUESTIONS 1-9: 0
SUM OF ALL RESPONSES TO PHQ9 QUESTIONS 1 & 2: 0
SUM OF ALL RESPONSES TO PHQ QUESTIONS 1-9: 0
SUM OF ALL RESPONSES TO PHQ QUESTIONS 1-9: 0

## 2023-07-17 ENCOUNTER — TRANSCRIBE ORDERS (OUTPATIENT)
Facility: HOSPITAL | Age: 68
End: 2023-07-17

## 2023-07-17 DIAGNOSIS — Z12.31 VISIT FOR SCREENING MAMMOGRAM: Primary | ICD-10-CM

## 2023-07-25 ENCOUNTER — HOSPITAL ENCOUNTER (OUTPATIENT)
Facility: HOSPITAL | Age: 68
Discharge: HOME | End: 2023-07-28
Payer: MEDICARE

## 2023-07-25 DIAGNOSIS — Z12.31 VISIT FOR SCREENING MAMMOGRAM: ICD-10-CM

## 2023-07-25 PROCEDURE — 77063 BREAST TOMOSYNTHESIS BI: CPT

## 2023-07-26 ENCOUNTER — OFFICE VISIT (OUTPATIENT)
Age: 68
End: 2023-07-26
Payer: MEDICARE

## 2023-07-26 VITALS
HEART RATE: 68 BPM | BODY MASS INDEX: 41.27 KG/M2 | WEIGHT: 218.6 LBS | HEIGHT: 61 IN | OXYGEN SATURATION: 98 % | SYSTOLIC BLOOD PRESSURE: 127 MMHG | TEMPERATURE: 98.2 F | RESPIRATION RATE: 20 BRPM | DIASTOLIC BLOOD PRESSURE: 87 MMHG

## 2023-07-26 DIAGNOSIS — E78.5 HYPERLIPIDEMIA, UNSPECIFIED HYPERLIPIDEMIA TYPE: ICD-10-CM

## 2023-07-26 DIAGNOSIS — E66.01 MORBID OBESITY (HCC): Primary | ICD-10-CM

## 2023-07-26 DIAGNOSIS — I10 PRIMARY HYPERTENSION: ICD-10-CM

## 2023-07-26 PROCEDURE — 99214 OFFICE O/P EST MOD 30 MIN: CPT | Performed by: INTERNAL MEDICINE

## 2023-07-26 PROCEDURE — 1036F TOBACCO NON-USER: CPT | Performed by: INTERNAL MEDICINE

## 2023-07-26 PROCEDURE — G8417 CALC BMI ABV UP PARAM F/U: HCPCS | Performed by: INTERNAL MEDICINE

## 2023-07-26 PROCEDURE — 3074F SYST BP LT 130 MM HG: CPT | Performed by: INTERNAL MEDICINE

## 2023-07-26 PROCEDURE — 3079F DIAST BP 80-89 MM HG: CPT | Performed by: INTERNAL MEDICINE

## 2023-07-26 PROCEDURE — 1123F ACP DISCUSS/DSCN MKR DOCD: CPT | Performed by: INTERNAL MEDICINE

## 2023-07-26 PROCEDURE — 1090F PRES/ABSN URINE INCON ASSESS: CPT | Performed by: INTERNAL MEDICINE

## 2023-07-26 PROCEDURE — 3017F COLORECTAL CA SCREEN DOC REV: CPT | Performed by: INTERNAL MEDICINE

## 2023-07-26 PROCEDURE — G8427 DOCREV CUR MEDS BY ELIG CLIN: HCPCS | Performed by: INTERNAL MEDICINE

## 2023-07-26 PROCEDURE — G8400 PT W/DXA NO RESULTS DOC: HCPCS | Performed by: INTERNAL MEDICINE

## 2023-07-26 RX ORDER — CHOLECALCIFEROL (VITAMIN D3) 125 MCG
1 CAPSULE ORAL DAILY
Qty: 90 TABLET | Refills: 3 | Status: SHIPPED | OUTPATIENT
Start: 2023-07-26

## 2023-07-26 ASSESSMENT — ENCOUNTER SYMPTOMS
WHEEZING: 0
CHEST TIGHTNESS: 0
COUGH: 1
GASTROINTESTINAL NEGATIVE: 1
SHORTNESS OF BREATH: 0

## 2023-07-26 ASSESSMENT — PATIENT HEALTH QUESTIONNAIRE - PHQ9
1. LITTLE INTEREST OR PLEASURE IN DOING THINGS: 0
SUM OF ALL RESPONSES TO PHQ QUESTIONS 1-9: 0
2. FEELING DOWN, DEPRESSED OR HOPELESS: 0
SUM OF ALL RESPONSES TO PHQ QUESTIONS 1-9: 0
SUM OF ALL RESPONSES TO PHQ9 QUESTIONS 1 & 2: 0
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
## 2023-07-27 ENCOUNTER — NURSE TRIAGE (OUTPATIENT)
Dept: OTHER | Facility: CLINIC | Age: 68
End: 2023-07-27
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
## 2023-07-31 ENCOUNTER — OFFICE VISIT (OUTPATIENT)
Age: 68
End: 2023-07-31

## 2023-07-31 DIAGNOSIS — E66.01 MORBID OBESITY (HCC): Primary | ICD-10-CM

## 2023-07-31 NOTE — PROGRESS NOTES
[facility]  Metabolic Program Follow-up Nutrition Consult    Date: 2023   Physician: Shane Thomas NP  Name: Evie Gutierrez  :  1955    Type of Plan: LCD    Weeks on Plan: 9 months  Virtual visit was completed through [address] [facility] ASSESSMENT:    Medications/Supplements:   Prior to Admission medications    Medication Sig Start Date End Date Taking?  Authorizing Provider   Cholecalciferol (VITAMIN D3) 50 MCG ( UT) TABS TAKE 1 TABLET BY MOUTH DAILY 23   Jerzy Cooler, MD   albuterol sulfate HFA (PROVENTIL;VENTOLIN;PROAIR) 108 (90 Base) MCG/ACT inhaler Inhale 2 puffs into the lungs every 4 hours as needed 17   Historical Provider, MD   albuterol (PROVENTIL) (2.5 MG/3ML) 0.083% nebulizer solution Inhale 3 mLs into the lungs    Historical Provider, MD   amLODIPine-benazepril (LOTREL) 5-20 MG per capsule Take 1 capsule by mouth daily 3/28/23   Historical Provider, MD   atorvastatin (LIPITOR) 40 MG tablet Take 1 tablet by mouth nightly 3/22/23   Historical Provider, MD   aspirin 81 MG chewable tablet Take 1 tablet by mouth daily    Historical Provider, MD   montelukast (SINGULAIR) 10 MG tablet Take 1 tablet by mouth nightly 2/15/19   Historical Provider, MD   tiotropium (SPIRIVA RESPIMAT) 1.25 MCG/ACT AERS inhaler Inhale 2 puffs into the lungs daily    Historical Provider, MD   mepolizumab (NUCALA) 100 MG/ML SOSY injection Inject 1 mL into the skin every 30 days    Historical Provider, MD   fluticasone furoate-vilanterol (BREO ELLIPTA) 200-25 MCG/ACT AEPB inhaler Inhale 1 puff into the lungs as needed    Historical Provider, MD   azelastine (ASTELIN) 0.1 % nasal spray 2 sprays by Nasal route 2 times daily    Historical Provider, MD   fluticasone (FLONASE) 50 MCG/ACT nasal spray 2 sprays by Nasal route daily as needed    Historical Provider, MD   ferrous sulfate (IRON 325) 325 (65 Fe) MG tablet Take 1 tablet by mouth every morning (before breakfast)    Historical

## 2023-08-09 ENCOUNTER — NURSE ONLY (OUTPATIENT)
Age: 68
End: 2023-08-09

## 2023-08-09 VITALS
WEIGHT: 214.7 LBS | SYSTOLIC BLOOD PRESSURE: 122 MMHG | RESPIRATION RATE: 18 BRPM | HEIGHT: 61 IN | HEART RATE: 67 BPM | TEMPERATURE: 97.8 F | DIASTOLIC BLOOD PRESSURE: 80 MMHG | BODY MASS INDEX: 40.54 KG/M2 | OXYGEN SATURATION: 98 %

## 2023-08-09 DIAGNOSIS — I10 PRIMARY HYPERTENSION: ICD-10-CM

## 2023-08-09 DIAGNOSIS — E78.5 HYPERLIPIDEMIA, UNSPECIFIED HYPERLIPIDEMIA TYPE: ICD-10-CM

## 2023-08-09 DIAGNOSIS — E66.01 MORBID OBESITY (HCC): Primary | ICD-10-CM

## 2023-08-09 ASSESSMENT — PATIENT HEALTH QUESTIONNAIRE - PHQ9
SUM OF ALL RESPONSES TO PHQ QUESTIONS 1-9: 0
SUM OF ALL RESPONSES TO PHQ9 QUESTIONS 1 & 2: 0
2. FEELING DOWN, DEPRESSED OR HOPELESS: 0
SUM OF ALL RESPONSES TO PHQ QUESTIONS 1-9: 0
1. LITTLE INTEREST OR PLEASURE IN DOING THINGS: 0
SUM OF ALL RESPONSES TO PHQ QUESTIONS 1-9: 0
SUM OF ALL RESPONSES TO PHQ QUESTIONS 1-9: 0

## 2023-08-09 NOTE — PROGRESS NOTES
Progress Note: Weekly Education Class in the [facility] Weight Loss Program         Patient is on Very Low Calorie Diet [] (4 meal replacements per day, 800 kcal/day)      Low Calorie Diet [x] (2-3 meal replacements per day, 6728-3898 kcal/day)    1) Did patient have any new symptoms or physical problems? Yes []    No [x]    If yes, check & comment: weakness [], fatigue [], lightheadedness [], headache [], cramps [], cold intolerance [], hair loss [], diarrhea [], constipation [],  NA [] other:                                 2) Has patient had any medical attention from other providers, urgent care or the emergency room this week? Yes []  No [x]       NA [], If yes, why:                                       3) Any other sugar sweetened beverages consumed this week? Yes [x]  No []    4) Did patient have any problems adhering to the diet? Yes [x]  No [] NA []    If yes, Vacation [], Celebrations [x], Conferences [], Family Reunions [x] other:                                                5) How many hours of sleep this week? 6    (range)  NA []    Number of meal replacements consumed daily? 2 (range)  NA []    Average ounces of water patient consumed daily this week (not including shakes)? 56     (divide the weekly total by 7)    Did you eat any food outside of the program? Yes [] No [x]    Physical Activity Over the Past Week:    Cardio exercise: 0 min  Strength exercise: 0 workouts / week  Number of steps walked per day: 5168    How has patient mood overall been this week? Sad [], Happy [], Stressed [], Tired [], Content [], NA [], other            Medications reconciled by nurse Yes [x]  No[]    Patient was given therapeutic recommendations for any noted side effects of their dietary approach based upon [facility] patient manual per providers recommendation. Identified patient with two patient identifiers (name and ).  Reviewed chart in preparation for visit and have obtained necessary

## 2023-08-15 NOTE — PROGRESS NOTES
Nurse note from patient's weekly LCD / Maintenance class was reviewed. Pertinent medical concerns were:   reviewed     BP Readings from Last 3 Encounters:   08/09/23 122/80   07/26/23 127/87   07/12/23 118/82       No flowsheet data found. Current Outpatient Medications   Medication Sig Dispense Refill    Cholecalciferol (VITAMIN D3) 50 MCG (2000 UT) TABS TAKE 1 TABLET BY MOUTH DAILY 90 tablet 3    albuterol sulfate HFA (PROVENTIL;VENTOLIN;PROAIR) 108 (90 Base) MCG/ACT inhaler Inhale 2 puffs into the lungs every 4 hours as needed      albuterol (PROVENTIL) (2.5 MG/3ML) 0.083% nebulizer solution Inhale 3 mLs into the lungs      amLODIPine-benazepril (LOTREL) 5-20 MG per capsule Take 1 capsule by mouth daily      atorvastatin (LIPITOR) 40 MG tablet Take 1 tablet by mouth nightly      aspirin 81 MG chewable tablet Take 1 tablet by mouth daily      montelukast (SINGULAIR) 10 MG tablet Take 1 tablet by mouth nightly      tiotropium (SPIRIVA RESPIMAT) 1.25 MCG/ACT AERS inhaler Inhale 2 puffs into the lungs daily      mepolizumab (NUCALA) 100 MG/ML SOSY injection Inject 1 mL into the skin every 30 days      fluticasone furoate-vilanterol (BREO ELLIPTA) 200-25 MCG/ACT AEPB inhaler Inhale 1 puff into the lungs as needed      azelastine (ASTELIN) 0.1 % nasal spray 2 sprays by Nasal route 2 times daily      fluticasone (FLONASE) 50 MCG/ACT nasal spray 2 sprays by Nasal route daily as needed      ferrous sulfate (IRON 325) 325 (65 Fe) MG tablet Take 1 tablet by mouth every morning (before breakfast)      vitamin B-12 (CYANOCOBALAMIN) 100 MCG tablet Take 10 tablets by mouth daily      calcium carbonate 600 MG TABS tablet Take 1 tablet by mouth nightly      Multiple Vitamins-Minerals (ONE-A-DAY WOMENS 50+ PO) Take 1 tablet by mouth daily       No current facility-administered medications for this visit.

## 2023-08-16 ENCOUNTER — OFFICE VISIT (OUTPATIENT)
Facility: CLINIC | Age: 68
End: 2023-08-16

## 2023-08-16 VITALS
TEMPERATURE: 98.1 F | WEIGHT: 217.8 LBS | OXYGEN SATURATION: 100 % | BODY MASS INDEX: 41.12 KG/M2 | DIASTOLIC BLOOD PRESSURE: 88 MMHG | HEART RATE: 61 BPM | SYSTOLIC BLOOD PRESSURE: 163 MMHG | HEIGHT: 61 IN | RESPIRATION RATE: 18 BRPM

## 2023-08-16 DIAGNOSIS — E66.01 MORBID OBESITY (HCC): ICD-10-CM

## 2023-08-16 DIAGNOSIS — Z86.2 HISTORY OF ANEMIA: ICD-10-CM

## 2023-08-16 DIAGNOSIS — M81.0 AGE RELATED OSTEOPOROSIS, UNSPECIFIED PATHOLOGICAL FRACTURE PRESENCE: ICD-10-CM

## 2023-08-16 DIAGNOSIS — D51.0 PERNICIOUS ANEMIA: ICD-10-CM

## 2023-08-16 DIAGNOSIS — J45.50 SEVERE PERSISTENT ASTHMA WITHOUT COMPLICATION: ICD-10-CM

## 2023-08-16 DIAGNOSIS — I10 PRIMARY HYPERTENSION: Primary | ICD-10-CM

## 2023-08-16 DIAGNOSIS — Z98.84 BARIATRIC SURGERY STATUS: ICD-10-CM

## 2023-08-16 LAB
25(OH)D3 SERPL-MCNC: 67.5 NG/ML (ref 30–100)
FERRITIN SERPL-MCNC: 118 NG/ML (ref 8–252)
VIT B12 SERPL-MCNC: 1353 PG/ML (ref 193–986)

## 2023-08-16 PROCEDURE — G8400 PT W/DXA NO RESULTS DOC: HCPCS | Performed by: INTERNAL MEDICINE

## 2023-08-16 PROCEDURE — G8417 CALC BMI ABV UP PARAM F/U: HCPCS | Performed by: INTERNAL MEDICINE

## 2023-08-16 PROCEDURE — 1036F TOBACCO NON-USER: CPT | Performed by: INTERNAL MEDICINE

## 2023-08-16 PROCEDURE — G8427 DOCREV CUR MEDS BY ELIG CLIN: HCPCS | Performed by: INTERNAL MEDICINE

## 2023-08-16 PROCEDURE — 1090F PRES/ABSN URINE INCON ASSESS: CPT | Performed by: INTERNAL MEDICINE

## 2023-08-16 PROCEDURE — 3017F COLORECTAL CA SCREEN DOC REV: CPT | Performed by: INTERNAL MEDICINE

## 2023-08-16 RX ORDER — IBUPROFEN 800 MG/1
TABLET ORAL
COMMUNITY
Start: 2023-07-18

## 2023-08-16 SDOH — ECONOMIC STABILITY: INCOME INSECURITY: HOW HARD IS IT FOR YOU TO PAY FOR THE VERY BASICS LIKE FOOD, HOUSING, MEDICAL CARE, AND HEATING?: NOT HARD AT ALL

## 2023-08-16 SDOH — ECONOMIC STABILITY: FOOD INSECURITY: WITHIN THE PAST 12 MONTHS, THE FOOD YOU BOUGHT JUST DIDN'T LAST AND YOU DIDN'T HAVE MONEY TO GET MORE.: NEVER TRUE

## 2023-08-16 SDOH — ECONOMIC STABILITY: FOOD INSECURITY: WITHIN THE PAST 12 MONTHS, YOU WORRIED THAT YOUR FOOD WOULD RUN OUT BEFORE YOU GOT MONEY TO BUY MORE.: NEVER TRUE

## 2023-08-16 SDOH — ECONOMIC STABILITY: HOUSING INSECURITY
IN THE LAST 12 MONTHS, WAS THERE A TIME WHEN YOU DID NOT HAVE A STEADY PLACE TO SLEEP OR SLEPT IN A SHELTER (INCLUDING NOW)?: NO

## 2023-08-16 ASSESSMENT — PATIENT HEALTH QUESTIONNAIRE - PHQ9
SUM OF ALL RESPONSES TO PHQ QUESTIONS 1-9: 0
2. FEELING DOWN, DEPRESSED OR HOPELESS: 0
SUM OF ALL RESPONSES TO PHQ9 QUESTIONS 1 & 2: 0
1. LITTLE INTEREST OR PLEASURE IN DOING THINGS: 0
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
## 2023-08-21 ENCOUNTER — HOSPITAL ENCOUNTER (OUTPATIENT)
Facility: HOSPITAL | Age: 68
Discharge: HOME | End: 2023-08-24
Payer: MEDICARE

## 2023-08-21 DIAGNOSIS — M81.0 AGE RELATED OSTEOPOROSIS, UNSPECIFIED PATHOLOGICAL FRACTURE PRESENCE: ICD-10-CM

## 2023-08-21 PROCEDURE — 77080 DXA BONE DENSITY AXIAL: CPT

## 2023-08-22 PROBLEM — M81.0 AGE-RELATED OSTEOPOROSIS WITHOUT CURRENT PATHOLOGICAL FRACTURE: Status: ACTIVE | Noted: 2023-08-22

## 2023-08-30 ENCOUNTER — OFFICE VISIT (OUTPATIENT)
Age: 68
End: 2023-08-30
Payer: MEDICARE

## 2023-08-30 VITALS
HEIGHT: 61 IN | DIASTOLIC BLOOD PRESSURE: 86 MMHG | SYSTOLIC BLOOD PRESSURE: 133 MMHG | BODY MASS INDEX: 40.55 KG/M2 | TEMPERATURE: 98.1 F | RESPIRATION RATE: 16 BRPM | OXYGEN SATURATION: 98 % | WEIGHT: 214.8 LBS | HEART RATE: 68 BPM

## 2023-08-30 DIAGNOSIS — Z98.84 BARIATRIC SURGERY STATUS: ICD-10-CM

## 2023-08-30 DIAGNOSIS — E66.01 MORBID OBESITY (HCC): Primary | ICD-10-CM

## 2023-08-30 DIAGNOSIS — I10 PRIMARY HYPERTENSION: ICD-10-CM

## 2023-08-30 PROCEDURE — 1036F TOBACCO NON-USER: CPT | Performed by: INTERNAL MEDICINE

## 2023-08-30 PROCEDURE — 3075F SYST BP GE 130 - 139MM HG: CPT | Performed by: INTERNAL MEDICINE

## 2023-08-30 PROCEDURE — G8417 CALC BMI ABV UP PARAM F/U: HCPCS | Performed by: INTERNAL MEDICINE

## 2023-08-30 PROCEDURE — 3017F COLORECTAL CA SCREEN DOC REV: CPT | Performed by: INTERNAL MEDICINE

## 2023-08-30 PROCEDURE — G8427 DOCREV CUR MEDS BY ELIG CLIN: HCPCS | Performed by: INTERNAL MEDICINE

## 2023-08-30 PROCEDURE — 1090F PRES/ABSN URINE INCON ASSESS: CPT | Performed by: INTERNAL MEDICINE

## 2023-08-30 PROCEDURE — G8399 PT W/DXA RESULTS DOCUMENT: HCPCS | Performed by: INTERNAL MEDICINE

## 2023-08-30 PROCEDURE — 99214 OFFICE O/P EST MOD 30 MIN: CPT | Performed by: INTERNAL MEDICINE

## 2023-08-30 PROCEDURE — 1123F ACP DISCUSS/DSCN MKR DOCD: CPT | Performed by: INTERNAL MEDICINE

## 2023-08-30 PROCEDURE — 3079F DIAST BP 80-89 MM HG: CPT | Performed by: INTERNAL MEDICINE

## 2023-08-30 ASSESSMENT — PATIENT HEALTH QUESTIONNAIRE - PHQ9
1. LITTLE INTEREST OR PLEASURE IN DOING THINGS: 0
SUM OF ALL RESPONSES TO PHQ QUESTIONS 1-9: 0
SUM OF ALL RESPONSES TO PHQ9 QUESTIONS 1 & 2: 0
2. FEELING DOWN, DEPRESSED OR HOPELESS: 0

## 2023-08-30 ASSESSMENT — ENCOUNTER SYMPTOMS: RESPIRATORY NEGATIVE: 1

## 2023-09-13 ENCOUNTER — NURSE ONLY (OUTPATIENT)
Age: 68
End: 2023-09-13

## 2023-09-13 VITALS
BODY MASS INDEX: 41.04 KG/M2 | RESPIRATION RATE: 18 BRPM | OXYGEN SATURATION: 97 % | WEIGHT: 217.4 LBS | HEART RATE: 64 BPM | DIASTOLIC BLOOD PRESSURE: 94 MMHG | TEMPERATURE: 98.1 F | HEIGHT: 61 IN | SYSTOLIC BLOOD PRESSURE: 148 MMHG

## 2023-09-13 DIAGNOSIS — E66.01 MORBID OBESITY (HCC): Primary | ICD-10-CM

## 2023-09-13 ASSESSMENT — PATIENT HEALTH QUESTIONNAIRE - PHQ9
1. LITTLE INTEREST OR PLEASURE IN DOING THINGS: 0
SUM OF ALL RESPONSES TO PHQ QUESTIONS 1-9: 0
2. FEELING DOWN, DEPRESSED OR HOPELESS: 0
SUM OF ALL RESPONSES TO PHQ QUESTIONS 1-9: 0
SUM OF ALL RESPONSES TO PHQ QUESTIONS 1-9: 0
SUM OF ALL RESPONSES TO PHQ9 QUESTIONS 1 & 2: 0
SUM OF ALL RESPONSES TO PHQ QUESTIONS 1-9: 0

## 2023-09-13 NOTE — PROGRESS NOTES
Progress Note: Weekly Education Class in the [facility] Weight Loss Program         Patient is on Very Low Calorie Diet [] (4 meal replacements per day, 800 kcal/day)      Low Calorie Diet [x] (2-3 meal replacements per day, 5393-1300 kcal/day)    1) Did patient have any new symptoms or physical problems? Yes []    No [x]    If yes, check & comment: weakness [], fatigue [], lightheadedness [], headache [], cramps [], cold intolerance [], hair loss [], diarrhea [], constipation [],  NA [] other:                                 2) Has patient had any medical attention from other providers, urgent care or the emergency room this week? Yes []  No [x]       NA [], If yes, why:                                       3) Any other sugar sweetened beverages consumed this week? Yes [x]  No []    4) Did patient have any problems adhering to the diet? Yes [x]  No [] NA []    If yes, Vacation [x], Celebrations [x], Conferences [], Family Reunions [] other:                                                5) How many hours of sleep this week? 5-7.25    (range)  NA []    Number of meal replacements consumed daily? 1-3 (range)  NA []    Average ounces of water patient consumed daily this week (not including shakes)? 30     (divide the weekly total by 7)    Did you eat any food outside of the program? Yes [] No [x]    Physical Activity Over the Past Week:    Cardio exercise: 0 min  Strength exercise: 0 workouts / week  Number of steps walked per day: 9535-1112    How has patient mood overall been this week? Sad [], Happy [], Stressed [], Tired [], Content [x], NA [], other            Medications reconciled by nurse Yes [x]  No[]    Patient was given therapeutic recommendations for any noted side effects of their dietary approach based upon [facility] patient manual per providers recommendation.

## 2023-09-18 ENCOUNTER — OFFICE VISIT (OUTPATIENT)
Age: 68
End: 2023-09-18

## 2023-09-18 DIAGNOSIS — E66.01 MORBID OBESITY (HCC): Primary | ICD-10-CM

## 2023-09-18 NOTE — PROGRESS NOTES
[facility]  Metabolic Program Follow-up Nutrition Consult    Date: 2023   Physician: Reeta Litten, NP  Name: Jasen Mckinnon  :  1955    Type of Plan: LCD  Weeks on Plan: 10 months  Virtual visit was completed through [address] [facility]. ASSESSMENT:    Medications/Supplements:   Prior to Admission medications    Medication Sig Start Date End Date Taking?  Authorizing Provider   ibuprofen (ADVIL;MOTRIN) 800 MG tablet TAKE 1 TABLET BY MOUTH EVERY 6 TO 8 HOURS AS NEEDED 23   Historical Provider, MD   vitamin B-12 500 MCG tablet Take 2 tablets by mouth daily 23   Mariana Marti MD   Cholecalciferol (VITAMIN D3) 50 MCG (2000 UT) TABS TAKE 1 TABLET BY MOUTH DAILY 23   Mariana Marti MD   albuterol sulfate HFA (PROVENTIL;VENTOLIN;PROAIR) 108 (90 Base) MCG/ACT inhaler Inhale 2 puffs into the lungs every 4 hours as needed 17   Historical Provider, MD   albuterol (PROVENTIL) (2.5 MG/3ML) 0.083% nebulizer solution Inhale 3 mLs into the lungs    Historical Provider, MD   amLODIPine-benazepril (LOTREL) 5-20 MG per capsule Take 1 capsule by mouth daily 3/28/23   Historical Provider, MD   atorvastatin (LIPITOR) 40 MG tablet Take 1 tablet by mouth nightly 3/22/23   Historical Provider, MD   aspirin 81 MG chewable tablet Take 1 tablet by mouth daily    Historical Provider, MD   montelukast (SINGULAIR) 10 MG tablet Take 1 tablet by mouth nightly 2/15/19   Historical Provider, MD   tiotropium (SPIRIVA RESPIMAT) 1.25 MCG/ACT AERS inhaler Inhale 2 puffs into the lungs daily    Historical Provider, MD   mepolizumab (NUCALA) 100 MG/ML SOSY injection Inject 1 mL into the skin every 30 days    Historical Provider, MD   fluticasone furoate-vilanterol (BREO ELLIPTA) 200-25 MCG/ACT AEPB inhaler Inhale 1 puff into the lungs as needed    Historical Provider, MD   azelastine (ASTELIN) 0.1 % nasal spray 2 sprays by Nasal route 2 times daily    Historical Provider, MD   fluticasone

## 2023-09-22 ENCOUNTER — TELEPHONE (OUTPATIENT)
Age: 68
End: 2023-09-22

## 2023-09-22 NOTE — TELEPHONE ENCOUNTER
Contacted patient to reschedule appointment on 10/11/23 as Tobydrew Ying will be out of the office.  Rescheduled appointment to 10/18/23 at 9:00 AM.

## 2023-10-04 ENCOUNTER — OFFICE VISIT (OUTPATIENT)
Facility: CLINIC | Age: 68
End: 2023-10-04
Payer: MEDICARE

## 2023-10-04 VITALS
RESPIRATION RATE: 16 BRPM | HEART RATE: 61 BPM | OXYGEN SATURATION: 96 % | TEMPERATURE: 98 F | BODY MASS INDEX: 40.97 KG/M2 | DIASTOLIC BLOOD PRESSURE: 80 MMHG | WEIGHT: 217 LBS | HEIGHT: 61 IN | SYSTOLIC BLOOD PRESSURE: 128 MMHG

## 2023-10-04 DIAGNOSIS — Z23 NEEDS FLU SHOT: ICD-10-CM

## 2023-10-04 DIAGNOSIS — I10 PRIMARY HYPERTENSION: Primary | ICD-10-CM

## 2023-10-04 PROCEDURE — 90694 VACC AIIV4 NO PRSRV 0.5ML IM: CPT | Performed by: INTERNAL MEDICINE

## 2023-10-04 PROCEDURE — G8427 DOCREV CUR MEDS BY ELIG CLIN: HCPCS | Performed by: INTERNAL MEDICINE

## 2023-10-04 PROCEDURE — 3017F COLORECTAL CA SCREEN DOC REV: CPT | Performed by: INTERNAL MEDICINE

## 2023-10-04 PROCEDURE — 1090F PRES/ABSN URINE INCON ASSESS: CPT | Performed by: INTERNAL MEDICINE

## 2023-10-04 PROCEDURE — 3079F DIAST BP 80-89 MM HG: CPT | Performed by: INTERNAL MEDICINE

## 2023-10-04 PROCEDURE — G8484 FLU IMMUNIZE NO ADMIN: HCPCS | Performed by: INTERNAL MEDICINE

## 2023-10-04 PROCEDURE — 3074F SYST BP LT 130 MM HG: CPT | Performed by: INTERNAL MEDICINE

## 2023-10-04 PROCEDURE — 1036F TOBACCO NON-USER: CPT | Performed by: INTERNAL MEDICINE

## 2023-10-04 PROCEDURE — 1123F ACP DISCUSS/DSCN MKR DOCD: CPT | Performed by: INTERNAL MEDICINE

## 2023-10-04 PROCEDURE — G0008 ADMIN INFLUENZA VIRUS VAC: HCPCS | Performed by: INTERNAL MEDICINE

## 2023-10-04 PROCEDURE — G8417 CALC BMI ABV UP PARAM F/U: HCPCS | Performed by: INTERNAL MEDICINE

## 2023-10-04 PROCEDURE — 99213 OFFICE O/P EST LOW 20 MIN: CPT | Performed by: INTERNAL MEDICINE

## 2023-10-04 PROCEDURE — G8399 PT W/DXA RESULTS DOCUMENT: HCPCS | Performed by: INTERNAL MEDICINE

## 2023-10-04 NOTE — PROGRESS NOTES
Desirae Greco is a 69 y.o. female and presents with   Chief Complaint   Patient presents with    Blood Pressure Check     Short term f/up        . Subjective:    (mother is Rachel Lucero)  [facility] from [facility] 2/5/2022      Pt comes-in for short term bp check. Her bp was quite elevated last appt    Pts meera le weakness since TIA x 2 has improved post PT    Chronic le pain. Told she has lumbar DJD (vs DDD). Pain has decreased of late    Pt is enrolled in [address] [place] [address] weight loss program.      PMH- HTN  BP Readings from Last 3 Encounters:   10/04/23 128/80   09/13/23 (!) 148/94   08/30/23 133/86         [facility]  Lab Results   Component Value Date    CHOL 176 02/14/2023    TRIG 58 02/14/2023    HDL 97 02/14/2023    LDLCALC 67.4 02/14/2023    LABVLDL 11.6 02/14/2023    VLDL 12 03/23/2022    CHOLHDLRATIO 1.8 02/14/2023     PFO-no surgical closure indicated as per cards      Vit B12 deficiency  Lab Results   Component Value Date    BJBZJQJO93 1353 (H) 08/16/2023       OA     Osteoporosis      H/o TIA     H/o kidney stones     PSH- meera TKR   Gastric bypass  Wt Readings from Last 3 Encounters:   10/04/23 217 lb (98.4 kg)   09/13/23 217 lb 6.4 oz (98.6 kg)   08/30/23 214 lb 12.8 oz (97.4 kg)        SH-   Lives alone   2 daughters and 4 grandchildren    FH- 6 siblings    HM  mammo- UTD  Colonoscopy- 1/22/2019  Immunizations  Eye care ~ 2 yrs ago  Dental care  BMD UTD      Review of Systems  Constitutional: negative for fevers, chills, anorexia and weight loss  Eyes:   negative for visual disturbance and irritation  ENT:   negative for tinnitus,sore throat,nasal congestion,ear pains. hoarseness  Respiratory:  negative for cough, hemoptysis, dyspnea,wheezing  CV:   negative for chest pain, palpitations, lower extremity edema  GI:   negative for nausea, vomiting, diarrhea, abdominal pain,melena  Musculoskel: positive for myalgias, arthralgias, back

## 2023-10-18 ENCOUNTER — OFFICE VISIT (OUTPATIENT)
Age: 68
End: 2023-10-18
Payer: MEDICARE

## 2023-10-18 VITALS
HEIGHT: 61 IN | SYSTOLIC BLOOD PRESSURE: 125 MMHG | RESPIRATION RATE: 20 BRPM | BODY MASS INDEX: 41.07 KG/M2 | WEIGHT: 217.5 LBS | HEART RATE: 62 BPM | TEMPERATURE: 97.9 F | OXYGEN SATURATION: 97 % | DIASTOLIC BLOOD PRESSURE: 85 MMHG

## 2023-10-18 DIAGNOSIS — E66.01 MORBID OBESITY (HCC): Primary | ICD-10-CM

## 2023-10-18 DIAGNOSIS — Z86.39 HISTORY OF DIABETES MELLITUS: ICD-10-CM

## 2023-10-18 DIAGNOSIS — E78.5 HYPERLIPIDEMIA, UNSPECIFIED HYPERLIPIDEMIA TYPE: ICD-10-CM

## 2023-10-18 DIAGNOSIS — I10 PRIMARY HYPERTENSION: ICD-10-CM

## 2023-10-18 PROCEDURE — G8417 CALC BMI ABV UP PARAM F/U: HCPCS | Performed by: INTERNAL MEDICINE

## 2023-10-18 PROCEDURE — G8399 PT W/DXA RESULTS DOCUMENT: HCPCS | Performed by: INTERNAL MEDICINE

## 2023-10-18 PROCEDURE — 1036F TOBACCO NON-USER: CPT | Performed by: INTERNAL MEDICINE

## 2023-10-18 PROCEDURE — 3074F SYST BP LT 130 MM HG: CPT | Performed by: INTERNAL MEDICINE

## 2023-10-18 PROCEDURE — G8427 DOCREV CUR MEDS BY ELIG CLIN: HCPCS | Performed by: INTERNAL MEDICINE

## 2023-10-18 PROCEDURE — 3079F DIAST BP 80-89 MM HG: CPT | Performed by: INTERNAL MEDICINE

## 2023-10-18 PROCEDURE — 3017F COLORECTAL CA SCREEN DOC REV: CPT | Performed by: INTERNAL MEDICINE

## 2023-10-18 PROCEDURE — 1123F ACP DISCUSS/DSCN MKR DOCD: CPT | Performed by: INTERNAL MEDICINE

## 2023-10-18 PROCEDURE — 1090F PRES/ABSN URINE INCON ASSESS: CPT | Performed by: INTERNAL MEDICINE

## 2023-10-18 PROCEDURE — G8484 FLU IMMUNIZE NO ADMIN: HCPCS | Performed by: INTERNAL MEDICINE

## 2023-10-18 PROCEDURE — 99214 OFFICE O/P EST MOD 30 MIN: CPT | Performed by: INTERNAL MEDICINE

## 2023-10-18 ASSESSMENT — ENCOUNTER SYMPTOMS
RESPIRATORY NEGATIVE: 1
GASTROINTESTINAL NEGATIVE: 1

## 2023-10-18 ASSESSMENT — PATIENT HEALTH QUESTIONNAIRE - PHQ9
SUM OF ALL RESPONSES TO PHQ QUESTIONS 1-9: 0
1. LITTLE INTEREST OR PLEASURE IN DOING THINGS: 0
SUM OF ALL RESPONSES TO PHQ QUESTIONS 1-9: 0
SUM OF ALL RESPONSES TO PHQ9 QUESTIONS 1 & 2: 0
SUM OF ALL RESPONSES TO PHQ QUESTIONS 1-9: 0
2. FEELING DOWN, DEPRESSED OR HOPELESS: 0
SUM OF ALL RESPONSES TO PHQ QUESTIONS 1-9: 0

## 2023-11-01 ENCOUNTER — NURSE ONLY (OUTPATIENT)
Age: 68
End: 2023-11-01

## 2023-11-01 VITALS
SYSTOLIC BLOOD PRESSURE: 129 MMHG | HEART RATE: 63 BPM | WEIGHT: 217 LBS | OXYGEN SATURATION: 98 % | BODY MASS INDEX: 40.97 KG/M2 | TEMPERATURE: 98.1 F | HEIGHT: 61 IN | DIASTOLIC BLOOD PRESSURE: 86 MMHG | RESPIRATION RATE: 20 BRPM

## 2023-11-01 DIAGNOSIS — E66.01 MORBID OBESITY (HCC): Primary | ICD-10-CM

## 2023-11-01 ASSESSMENT — PATIENT HEALTH QUESTIONNAIRE - PHQ9
SUM OF ALL RESPONSES TO PHQ QUESTIONS 1-9: 0
2. FEELING DOWN, DEPRESSED OR HOPELESS: 0
SUM OF ALL RESPONSES TO PHQ QUESTIONS 1-9: 0
1. LITTLE INTEREST OR PLEASURE IN DOING THINGS: 0
SUM OF ALL RESPONSES TO PHQ9 QUESTIONS 1 & 2: 0
SUM OF ALL RESPONSES TO PHQ QUESTIONS 1-9: 0
SUM OF ALL RESPONSES TO PHQ QUESTIONS 1-9: 0

## 2023-11-01 NOTE — PROGRESS NOTES
Progress Note: Weekly Education Class in the [facility]         Patient is on Very Low Calorie Diet [] (4 meal replacements per day, 800 kcal/day)      Low Calorie Diet [x] (2-3 meal replacements per day, 4720-4982 kcal/day)    1) Did patient have any new symptoms or physical problems? Yes []    No [x]    If yes, check & comment: weakness [], fatigue [], lightheadedness [], headache [], cramps [], cold intolerance [], hair loss [], diarrhea [], constipation [],  NA [] other:                                 2) Has patient had any medical attention from other providers, urgent care or the emergency room this week? Yes []  No [x]       NA [], If yes, why:                                       3) Any other sugar sweetened beverages consumed this week? Yes [x]  No []    4) Did patient have any problems adhering to the diet? Yes [x]  No [] NA []    If yes, Vacation [], Celebrations [x], Conferences [], Family Reunions [] other:                                                5) How many hours of sleep this week? 5-8    (range)  NA []    Number of meal replacements consumed daily? 2 (range)  NA []    Average ounces of water patient consumed daily this week (not including shakes)? 35     (divide the weekly total by 7)    Did you eat any food outside of the program? Yes [x] No []    Physical Activity Over the Past Week:    Cardio exercise: 0 min  Strength exercise: 0 workouts / week  Number of steps walked per day: 9144-4266    How has patient mood overall been this week? Sad [], Happy [], Stressed [], Tired [], Content [x], NA [], other            Medications reconciled by nurse Yes [x]  No[]    Patient was given therapeutic recommendations for any noted side effects of their dietary approach based upon [facility] patient manual per providers recommendation.

## 2023-11-06 ENCOUNTER — OFFICE VISIT (OUTPATIENT)
Age: 68
End: 2023-11-06

## 2023-11-06 DIAGNOSIS — E66.01 MORBID OBESITY (HCC): Primary | ICD-10-CM

## 2023-11-06 NOTE — PROGRESS NOTES
[facility]  Metabolic Program Follow-up Nutrition Consult    Date: 2023   Physician: Zulma Silva NP  Name: Zuleyma Seo  :  1955    Type of Plan: LCD  Weeks on Plan: 11 month  Virtual visit was completed through [address] [facility] ASSESSMENT:    Medications/Supplements:   Prior to Admission medications    Medication Sig Start Date End Date Taking?  Authorizing Provider   ibuprofen (ADVIL;MOTRIN) 800 MG tablet TAKE 1 TABLET BY MOUTH EVERY 6 TO 8 HOURS AS NEEDED 23   Jatin Collins MD   vitamin B-12 500 MCG tablet Take 2 tablets by mouth daily 23   Laya Shook MD   Cholecalciferol (VITAMIN D3) 50 MCG (2000 UT) TABS TAKE 1 TABLET BY MOUTH DAILY 23   Laya Shook MD   albuterol sulfate HFA (PROVENTIL;VENTOLIN;PROAIR) 108 (90 Base) MCG/ACT inhaler Inhale 2 puffs into the lungs every 4 hours as needed 17   Jatin Collins MD   albuterol (PROVENTIL) (2.5 MG/3ML) 0.083% nebulizer solution Inhale 3 mLs into the lungs    Jatin Collins MD   amLODIPine-benazepril (LOTREL) 5-20 MG per capsule Take 1 capsule by mouth daily 3/28/23   Jatin Collins MD   atorvastatin (LIPITOR) 40 MG tablet Take 1 tablet by mouth nightly 3/22/23   Jatin Collins MD   aspirin 81 MG chewable tablet Take 1 tablet by mouth daily    Jatin Collins MD   montelukast (SINGULAIR) 10 MG tablet Take 1 tablet by mouth nightly 2/15/19   Jatin Collins MD   tiotropium (SPIRIVA RESPIMAT) 1.25 MCG/ACT AERS inhaler Inhale 2 puffs into the lungs daily    Jatin Collins MD   mepolizumab (NUCALA) 100 MG/ML SOSY injection Inject 1 mL into the skin every 30 days    Jatin Collins MD   fluticasone furoate-vilanterol (BREO ELLIPTA) 200-25 MCG/ACT AEPB inhaler Inhale 1 puff into the lungs as needed    Jatin Collins MD   azelastine (ASTELIN) 0.1 % nasal spray 2 sprays by Nasal route 2 times daily    Jatin Collins MD

## 2023-11-15 ENCOUNTER — OFFICE VISIT (OUTPATIENT)
Age: 68
End: 2023-11-15
Payer: MEDICARE

## 2023-11-15 VITALS
DIASTOLIC BLOOD PRESSURE: 89 MMHG | HEART RATE: 64 BPM | RESPIRATION RATE: 18 BRPM | HEIGHT: 61 IN | WEIGHT: 216.9 LBS | BODY MASS INDEX: 40.95 KG/M2 | TEMPERATURE: 98.3 F | OXYGEN SATURATION: 97 % | SYSTOLIC BLOOD PRESSURE: 138 MMHG

## 2023-11-15 DIAGNOSIS — E78.5 HYPERLIPIDEMIA, UNSPECIFIED HYPERLIPIDEMIA TYPE: ICD-10-CM

## 2023-11-15 DIAGNOSIS — E66.01 MORBID OBESITY (HCC): Primary | ICD-10-CM

## 2023-11-15 DIAGNOSIS — I10 PRIMARY HYPERTENSION: ICD-10-CM

## 2023-11-15 DIAGNOSIS — E66.01 MORBID OBESITY (HCC): ICD-10-CM

## 2023-11-15 DIAGNOSIS — Z86.39 HISTORY OF DIABETES MELLITUS: ICD-10-CM

## 2023-11-15 LAB
BASOPHILS # BLD AUTO: 0 X10E3/UL (ref 0–0.2)
BASOPHILS NFR BLD AUTO: 0 %
EOSINOPHIL # BLD AUTO: 0 X10E3/UL (ref 0–0.4)
EOSINOPHIL NFR BLD AUTO: 1 %
ERYTHROCYTE [DISTWIDTH] IN BLOOD BY AUTOMATED COUNT: 13.3 % (ref 11.7–15.4)
HCT VFR BLD AUTO: 42.9 % (ref 34–46.6)
HGB BLD-MCNC: 13.7 G/DL (ref 11.1–15.9)
IMM GRANULOCYTES # BLD AUTO: 0 X10E3/UL (ref 0–0.1)
IMM GRANULOCYTES NFR BLD AUTO: 0 %
LYMPHOCYTES # BLD AUTO: 1.9 X10E3/UL (ref 0.7–3.1)
LYMPHOCYTES NFR BLD AUTO: 31 %
MCH RBC QN AUTO: 26.1 PG (ref 26.6–33)
MCHC RBC AUTO-ENTMCNC: 31.9 G/DL (ref 31.5–35.7)
MCV RBC AUTO: 82 FL (ref 79–97)
MONOCYTES # BLD AUTO: 0.6 X10E3/UL (ref 0.1–0.9)
MONOCYTES NFR BLD AUTO: 9 %
NEUTROPHILS # BLD AUTO: 3.5 X10E3/UL (ref 1.4–7)
NEUTROPHILS NFR BLD AUTO: 59 %
PLATELET # BLD AUTO: 196 X10E3/UL (ref 150–450)
RBC # BLD AUTO: 5.25 X10E6/UL (ref 3.77–5.28)
WBC # BLD AUTO: 6 X10E3/UL (ref 3.4–10.8)

## 2023-11-15 PROCEDURE — 1090F PRES/ABSN URINE INCON ASSESS: CPT | Performed by: INTERNAL MEDICINE

## 2023-11-15 PROCEDURE — 3075F SYST BP GE 130 - 139MM HG: CPT | Performed by: INTERNAL MEDICINE

## 2023-11-15 PROCEDURE — G8417 CALC BMI ABV UP PARAM F/U: HCPCS | Performed by: INTERNAL MEDICINE

## 2023-11-15 PROCEDURE — 1036F TOBACCO NON-USER: CPT | Performed by: INTERNAL MEDICINE

## 2023-11-15 PROCEDURE — G8399 PT W/DXA RESULTS DOCUMENT: HCPCS | Performed by: INTERNAL MEDICINE

## 2023-11-15 PROCEDURE — G8427 DOCREV CUR MEDS BY ELIG CLIN: HCPCS | Performed by: INTERNAL MEDICINE

## 2023-11-15 PROCEDURE — 3017F COLORECTAL CA SCREEN DOC REV: CPT | Performed by: INTERNAL MEDICINE

## 2023-11-15 PROCEDURE — 99214 OFFICE O/P EST MOD 30 MIN: CPT | Performed by: INTERNAL MEDICINE

## 2023-11-15 PROCEDURE — 3079F DIAST BP 80-89 MM HG: CPT | Performed by: INTERNAL MEDICINE

## 2023-11-15 PROCEDURE — 1123F ACP DISCUSS/DSCN MKR DOCD: CPT | Performed by: INTERNAL MEDICINE

## 2023-11-15 PROCEDURE — G8484 FLU IMMUNIZE NO ADMIN: HCPCS | Performed by: INTERNAL MEDICINE

## 2023-11-15 ASSESSMENT — PATIENT HEALTH QUESTIONNAIRE - PHQ9
SUM OF ALL RESPONSES TO PHQ9 QUESTIONS 1 & 2: 0
SUM OF ALL RESPONSES TO PHQ QUESTIONS 1-9: 0
1. LITTLE INTEREST OR PLEASURE IN DOING THINGS: 0
SUM OF ALL RESPONSES TO PHQ QUESTIONS 1-9: 0
2. FEELING DOWN, DEPRESSED OR HOPELESS: 0
SUM OF ALL RESPONSES TO PHQ QUESTIONS 1-9: 0
SUM OF ALL RESPONSES TO PHQ QUESTIONS 1-9: 0

## 2023-11-15 ASSESSMENT — ENCOUNTER SYMPTOMS
RESPIRATORY NEGATIVE: 1
GASTROINTESTINAL NEGATIVE: 1

## 2023-11-16 LAB
ALBUMIN SERPL-MCNC: 4.3 G/DL (ref 3.9–4.9)
ALBUMIN/GLOB SERPL: 2 {RATIO} (ref 1.2–2.2)
ALP SERPL-CCNC: 102 IU/L (ref 44–121)
ALT SERPL-CCNC: 23 IU/L (ref 0–32)
AST SERPL-CCNC: 24 IU/L (ref 0–40)
BILIRUB SERPL-MCNC: <0.2 MG/DL (ref 0–1.2)
BUN SERPL-MCNC: 15 MG/DL (ref 8–27)
BUN/CREAT SERPL: 21 (ref 12–28)
CALCIUM SERPL-MCNC: 10.1 MG/DL (ref 8.7–10.3)
CHLORIDE SERPL-SCNC: 104 MMOL/L (ref 96–106)
CHOLEST SERPL-MCNC: 207 MG/DL (ref 100–199)
CO2 SERPL-SCNC: 25 MMOL/L (ref 20–29)
CREAT SERPL-MCNC: 0.72 MG/DL (ref 0.57–1)
EGFRCR SERPLBLD CKD-EPI 2021: 90 ML/MIN/1.73
GLOBULIN SER CALC-MCNC: 2.2 G/DL (ref 1.5–4.5)
GLUCOSE SERPL-MCNC: 89 MG/DL (ref 70–99)
HBA1C MFR BLD: 5.7 % (ref 4.8–5.6)
HDLC SERPL-MCNC: 97 MG/DL
LDLC SERPL CALC-MCNC: 101 MG/DL (ref 0–99)
POTASSIUM SERPL-SCNC: 4.4 MMOL/L (ref 3.5–5.2)
PROT SERPL-MCNC: 6.5 G/DL (ref 6–8.5)
SODIUM SERPL-SCNC: 141 MMOL/L (ref 134–144)
TRIGL SERPL-MCNC: 47 MG/DL (ref 0–149)
VLDLC SERPL CALC-MCNC: 9 MG/DL (ref 5–40)

## 2023-11-27 ENCOUNTER — NURSE ONLY (OUTPATIENT)
Age: 68
End: 2023-11-27

## 2023-11-27 VITALS
RESPIRATION RATE: 20 BRPM | BODY MASS INDEX: 40.97 KG/M2 | HEART RATE: 71 BPM | WEIGHT: 217 LBS | DIASTOLIC BLOOD PRESSURE: 87 MMHG | SYSTOLIC BLOOD PRESSURE: 137 MMHG | OXYGEN SATURATION: 98 % | TEMPERATURE: 98.1 F | HEIGHT: 61 IN

## 2023-11-27 DIAGNOSIS — E66.01 MORBID OBESITY (HCC): Primary | ICD-10-CM

## 2023-11-27 NOTE — PROGRESS NOTES
Progress Note: Weekly Education Class in the [facility]         Patient is on Very Low Calorie Diet [] (4 meal replacements per day, 800 kcal/day)      Low Calorie Diet [x] (2-3 meal replacements per day, 0482-0935 kcal/day)    1) Did patient have any new symptoms or physical problems? Yes []    No [x]    If yes, check & comment: weakness [], fatigue [], lightheadedness [], headache [], cramps [], cold intolerance [], hair loss [], diarrhea [], constipation [],  NA [] other:                                 2) Has patient had any medical attention from other providers, urgent care or the emergency room this week? Yes []  No [x]       NA [], If yes, why:                                       3) Any other sugar sweetened beverages consumed this week? Yes []  No [x]    4) Did patient have any problems adhering to the diet? Yes [x]  No [] NA []    If yes, Vacation [], Celebrations [], Conferences [], Family Reunions [] other:                                                5) How many hours of sleep this week? 4.5-7.5    (range)  NA []    Number of meal replacements consumed daily? 1-2 (range)  NA []    Average ounces of water patient consumed daily this week (not including shakes)? 23     (divide the weekly total by 7)    Did you eat any food outside of the program? Yes [x] No []    Physical Activity Over the Past Week:    Cardio exercise: 0 min  Strength exercise: 0 workouts / week  Number of steps walked per day: 3054-6794    How has patient mood overall been this week? Sad [], Happy [], Stressed [], Tired [x], Content [x], NA [], other            Medications reconciled by nurse Yes [x]  No[]    Patient was given therapeutic recommendations for any noted side effects of their dietary approach based upon [facility] patient manual per providers recommendation.

## 2023-12-13 ENCOUNTER — OFFICE VISIT (OUTPATIENT)
Age: 68
End: 2023-12-13
Payer: MEDICARE

## 2023-12-13 VITALS
OXYGEN SATURATION: 96 % | DIASTOLIC BLOOD PRESSURE: 88 MMHG | BODY MASS INDEX: 40.23 KG/M2 | RESPIRATION RATE: 20 BRPM | HEIGHT: 61 IN | SYSTOLIC BLOOD PRESSURE: 138 MMHG | TEMPERATURE: 97.9 F | HEART RATE: 62 BPM | WEIGHT: 213.1 LBS

## 2023-12-13 DIAGNOSIS — M79.605 LEG PAIN, BILATERAL: ICD-10-CM

## 2023-12-13 DIAGNOSIS — I10 PRIMARY HYPERTENSION: ICD-10-CM

## 2023-12-13 DIAGNOSIS — E78.5 HYPERLIPIDEMIA, UNSPECIFIED HYPERLIPIDEMIA TYPE: ICD-10-CM

## 2023-12-13 DIAGNOSIS — E66.01 MORBID OBESITY (HCC): Primary | ICD-10-CM

## 2023-12-13 DIAGNOSIS — M79.604 LEG PAIN, BILATERAL: ICD-10-CM

## 2023-12-13 PROCEDURE — 1123F ACP DISCUSS/DSCN MKR DOCD: CPT | Performed by: INTERNAL MEDICINE

## 2023-12-13 PROCEDURE — G8484 FLU IMMUNIZE NO ADMIN: HCPCS | Performed by: INTERNAL MEDICINE

## 2023-12-13 PROCEDURE — G8417 CALC BMI ABV UP PARAM F/U: HCPCS | Performed by: INTERNAL MEDICINE

## 2023-12-13 PROCEDURE — 99214 OFFICE O/P EST MOD 30 MIN: CPT | Performed by: INTERNAL MEDICINE

## 2023-12-13 PROCEDURE — G8427 DOCREV CUR MEDS BY ELIG CLIN: HCPCS | Performed by: INTERNAL MEDICINE

## 2023-12-13 PROCEDURE — 3079F DIAST BP 80-89 MM HG: CPT | Performed by: INTERNAL MEDICINE

## 2023-12-13 PROCEDURE — G8399 PT W/DXA RESULTS DOCUMENT: HCPCS | Performed by: INTERNAL MEDICINE

## 2023-12-13 PROCEDURE — 3017F COLORECTAL CA SCREEN DOC REV: CPT | Performed by: INTERNAL MEDICINE

## 2023-12-13 PROCEDURE — 1090F PRES/ABSN URINE INCON ASSESS: CPT | Performed by: INTERNAL MEDICINE

## 2023-12-13 PROCEDURE — 3075F SYST BP GE 130 - 139MM HG: CPT | Performed by: INTERNAL MEDICINE

## 2023-12-13 PROCEDURE — 1036F TOBACCO NON-USER: CPT | Performed by: INTERNAL MEDICINE

## 2023-12-13 ASSESSMENT — ENCOUNTER SYMPTOMS
RESPIRATORY NEGATIVE: 1
GASTROINTESTINAL NEGATIVE: 1

## 2023-12-13 ASSESSMENT — PATIENT HEALTH QUESTIONNAIRE - PHQ9
SUM OF ALL RESPONSES TO PHQ QUESTIONS 1-9: 0
SUM OF ALL RESPONSES TO PHQ QUESTIONS 1-9: 0
SUM OF ALL RESPONSES TO PHQ9 QUESTIONS 1 & 2: 0
SUM OF ALL RESPONSES TO PHQ QUESTIONS 1-9: 0
1. LITTLE INTEREST OR PLEASURE IN DOING THINGS: 0
SUM OF ALL RESPONSES TO PHQ QUESTIONS 1-9: 0
2. FEELING DOWN, DEPRESSED OR HOPELESS: 0

## 2024-01-02 ENCOUNTER — OFFICE VISIT (OUTPATIENT)
Age: 69
End: 2024-01-02

## 2024-01-02 VITALS
HEIGHT: 61 IN | OXYGEN SATURATION: 96 % | RESPIRATION RATE: 18 BRPM | WEIGHT: 218 LBS | DIASTOLIC BLOOD PRESSURE: 89 MMHG | SYSTOLIC BLOOD PRESSURE: 132 MMHG | BODY MASS INDEX: 41.16 KG/M2 | TEMPERATURE: 98.1 F | HEART RATE: 78 BPM

## 2024-01-02 DIAGNOSIS — J06.9 VIRAL UPPER RESPIRATORY TRACT INFECTION: ICD-10-CM

## 2024-01-02 DIAGNOSIS — E66.01 OBESITY, CLASS III, BMI 40-49.9 (MORBID OBESITY) (HCC): ICD-10-CM

## 2024-01-02 DIAGNOSIS — R05.8 OTHER COUGH: Primary | ICD-10-CM

## 2024-01-02 LAB
Lab: NORMAL
QC PASS/FAIL: NORMAL
SARS-COV-2 RDRP RESP QL NAA+PROBE: NEGATIVE

## 2024-01-02 RX ORDER — FUROSEMIDE 20 MG/1
20 TABLET ORAL DAILY
COMMUNITY

## 2024-01-02 RX ORDER — ACETAMINOPHEN 160 MG
TABLET,DISINTEGRATING ORAL
COMMUNITY

## 2024-01-02 RX ORDER — MONTELUKAST SODIUM 10 MG/1
10 TABLET ORAL NIGHTLY
COMMUNITY

## 2024-01-02 NOTE — PROGRESS NOTES
Subjective:       History was provided by the patient.  Maria G Sanchez is a 68 y.o. female who presents for evaluation of symptoms of a URI. Symptoms include  none was exposed to Covid . Onset of symptoms was 0 day ago, stable since that time. Associated symptoms include  none .  She is drinking plenty of fluids. Evaluation to date: none. Treatment to date: none  Patient's medications, allergies, past medical, surgical, social and family histories were reviewed and updated as appropriate.    Review of Systems  Pertinent items are noted in HPI.      Objective:      General appearance: alert, appears stated age, and cooperative  Ears: normal TM's and external ear canals both ears  Nose: no discharge, turbinates normal, no sinus tenderness  Throat: normal findings: pink and moist  Lungs: clear to auscultation bilaterally  Heart: S1, S2 normal         Assessment:      viral upper respiratory illness, patient asymptomatic was exposed to Covid, Covid negative, VSS, afebrile, SPO2   96% on room air.  -POCT COVID-19 Rapid, NAAT  Results for orders placed or performed in visit on 01/02/24   POCT COVID-19 Rapid, NAAT   Result Value Ref Range    SARS-COV-2, RdRp gene Negative Negative    Lot Number [id number]     QC Pass/Fail PASS            Plan:      Discussed dx and tx of URIs  Suggested symptomatic OTC remedies.  Nasal saline sprays for congestion.  RTC prn.      1. Handout given with care instructions.     2. OTC for symptom management. Increase fluid intake.     3. Follow-up with PCP as needed.     4. Go to ED with development of any acute symptoms.      Follow-up     Follow-up with PCP or ER  immediately for any new worsening or changes or if symptoms are not improving over the next 5-7 days. Patient verbalizes understanding, no questions or concerns at this time.

## 2024-01-03 ENCOUNTER — TELEMEDICINE (OUTPATIENT)
Age: 69
End: 2024-01-03
Payer: MEDICARE

## 2024-01-03 DIAGNOSIS — E66.01 MORBID OBESITY (HCC): ICD-10-CM

## 2024-01-03 DIAGNOSIS — F50.9 EATING DISORDER, UNSPECIFIED TYPE: Primary | ICD-10-CM

## 2024-01-03 PROCEDURE — 90834 PSYTX W PT 45 MINUTES: CPT | Performed by: COUNSELOR

## 2024-01-03 PROCEDURE — 1123F ACP DISCUSS/DSCN MKR DOCD: CPT | Performed by: COUNSELOR

## 2024-01-03 NOTE — PROGRESS NOTES
[facility]   In-Office Counseling Session Note      This note will not be viewable in [employer] for the following reason(s). This is a Psychotherapy Note. (Behavorial Health Providers Only)      Name: Maria G Sanchez                                                :  1955    Gender:  female    Marital Status:                                     Race/Ethnicity:  BLACK/       Date:  24    Time Start: 9:01 AM    Time End:  9:45 AM   CPT code:  84067   Virtual:  Y      Maria G Sanchez was evaluated through a patient-initiated, synchronous (real-time) audio-visual encounter.  Patient (or guardian if applicable) is aware that it is a billable service, which includes applicable co-pays, with coverage as determined by her insurance carrier. This visit was conducted with the patient's (and/or legal guardian's) verbal consent.  The patient was located in a state where the provider was licensed to provide care.  The patient was located at: Home: [address] [place] [address]  The provider was located at: Home [place]      Chief Complaint/Presenting Problem:  Maria G continues to express frustration with eating habits.  She continues to struggle with motivation and states that she often feels like she overeats to the point of not feeling good and having pain in her stomach since her stomach is smaller from having had bariatric surgery in the past.  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  However, she states that when she comes home and if she is bored, she tends to eat snacks.  She associates her eating with boredom and loneliness.      Current Symptoms:  Feeling of a lack of control in her eating      Objective (factual account of what was observed, mental health status):   Maria G Sanchez is a 67 y.o. female who is alert and oriented

## 2024-01-08 ENCOUNTER — OFFICE VISIT (OUTPATIENT)
Age: 69
End: 2024-01-08

## 2024-01-08 DIAGNOSIS — E66.01 MORBID OBESITY (HCC): Primary | ICD-10-CM

## 2024-01-08 NOTE — PROGRESS NOTES
[facility]  Metabolic Program Follow-up Nutrition Consult    Date: 2024   Physician: Ursula Wan NP  Name: Maria G Sanchez  :  1955    Type of Plan: LCD  Weeks on Plan: 12 months  Virtual visit was completed through Zoom.    ASSESSMENT:    Medications/Supplements:   Prior to Admission medications    Medication Sig Start Date End Date Taking? Authorizing Provider   furosemide (LASIX) 20 MG tablet Take 1 tablet by mouth daily    Jatin Collins MD   montelukast (SINGULAIR) 10 MG tablet Take 1 tablet by mouth nightly    Jatin Collins MD   Cholecalciferol (VITAMIN D3) 50 MCG ( UT) CAPS Take by mouth    Jatin Collins MD   ibuprofen (ADVIL;MOTRIN) 800 MG tablet TAKE 1 TABLET BY MOUTH EVERY 6 TO 8 HOURS AS NEEDED 23   Jatin Collins MD   vitamin B-12 500 MCG tablet Take 2 tablets by mouth daily 23   Rissa Hamilton MD   Cholecalciferol (VITAMIN D3) 50 MCG ( UT) TABS TAKE 1 TABLET BY MOUTH DAILY 23   Rissa Hamilton MD   albuterol sulfate HFA (PROVENTIL;VENTOLIN;PROAIR) 108 (90 Base) MCG/ACT inhaler Inhale 2 puffs into the lungs every 4 hours as needed 17   Jatin Collins MD   albuterol (PROVENTIL) (2.5 MG/3ML) 0.083% nebulizer solution Inhale 3 mLs into the lungs    Jatin Collins MD   amLODIPine-benazepril (LOTREL) 5-20 MG per capsule Take 1 capsule by mouth daily 3/28/23   Jatin Collins MD   atorvastatin (LIPITOR) 40 MG tablet Take 1 tablet by mouth nightly 3/22/23   Jatin Collins MD   aspirin 81 MG chewable tablet Take 1 tablet by mouth daily    Jatin Collins MD   montelukast (SINGULAIR) 10 MG tablet Take 1 tablet by mouth nightly 2/15/19   Jatin Collins MD   tiotropium (SPIRIVA RESPIMAT) 1.25 MCG/ACT AERS inhaler Inhale 2 puffs into the lungs daily    Jatin Collins MD   mepolizumab (NUCALA) 100 MG/ML SOSY injection Inject 1 mL into the skin every 30 days

## 2024-01-10 ENCOUNTER — OFFICE VISIT (OUTPATIENT)
Age: 69
End: 2024-01-10
Payer: MEDICARE

## 2024-01-10 VITALS
HEIGHT: 61 IN | WEIGHT: 216.4 LBS | BODY MASS INDEX: 40.86 KG/M2 | TEMPERATURE: 98.2 F | SYSTOLIC BLOOD PRESSURE: 130 MMHG | HEART RATE: 61 BPM | RESPIRATION RATE: 16 BRPM | OXYGEN SATURATION: 98 % | DIASTOLIC BLOOD PRESSURE: 88 MMHG

## 2024-01-10 DIAGNOSIS — E66.01 MORBID OBESITY (HCC): Primary | ICD-10-CM

## 2024-01-10 DIAGNOSIS — I10 PRIMARY HYPERTENSION: ICD-10-CM

## 2024-01-10 DIAGNOSIS — M79.604 LEG PAIN, BILATERAL: ICD-10-CM

## 2024-01-10 DIAGNOSIS — M79.605 LEG PAIN, BILATERAL: ICD-10-CM

## 2024-01-10 PROCEDURE — 1036F TOBACCO NON-USER: CPT | Performed by: INTERNAL MEDICINE

## 2024-01-10 PROCEDURE — G8427 DOCREV CUR MEDS BY ELIG CLIN: HCPCS | Performed by: INTERNAL MEDICINE

## 2024-01-10 PROCEDURE — 3075F SYST BP GE 130 - 139MM HG: CPT | Performed by: INTERNAL MEDICINE

## 2024-01-10 PROCEDURE — G8417 CALC BMI ABV UP PARAM F/U: HCPCS | Performed by: INTERNAL MEDICINE

## 2024-01-10 PROCEDURE — G8484 FLU IMMUNIZE NO ADMIN: HCPCS | Performed by: INTERNAL MEDICINE

## 2024-01-10 PROCEDURE — 1123F ACP DISCUSS/DSCN MKR DOCD: CPT | Performed by: INTERNAL MEDICINE

## 2024-01-10 PROCEDURE — 3079F DIAST BP 80-89 MM HG: CPT | Performed by: INTERNAL MEDICINE

## 2024-01-10 PROCEDURE — 3017F COLORECTAL CA SCREEN DOC REV: CPT | Performed by: INTERNAL MEDICINE

## 2024-01-10 PROCEDURE — 1090F PRES/ABSN URINE INCON ASSESS: CPT | Performed by: INTERNAL MEDICINE

## 2024-01-10 PROCEDURE — G8399 PT W/DXA RESULTS DOCUMENT: HCPCS | Performed by: INTERNAL MEDICINE

## 2024-01-10 PROCEDURE — 99214 OFFICE O/P EST MOD 30 MIN: CPT | Performed by: INTERNAL MEDICINE

## 2024-01-10 ASSESSMENT — PATIENT HEALTH QUESTIONNAIRE - PHQ9
SUM OF ALL RESPONSES TO PHQ QUESTIONS 1-9: 0
1. LITTLE INTEREST OR PLEASURE IN DOING THINGS: 0
SUM OF ALL RESPONSES TO PHQ QUESTIONS 1-9: 0
2. FEELING DOWN, DEPRESSED OR HOPELESS: 0
SUM OF ALL RESPONSES TO PHQ QUESTIONS 1-9: 0
SUM OF ALL RESPONSES TO PHQ QUESTIONS 1-9: 0
SUM OF ALL RESPONSES TO PHQ9 QUESTIONS 1 & 2: 0

## 2024-01-10 ASSESSMENT — ENCOUNTER SYMPTOMS: RESPIRATORY NEGATIVE: 1

## 2024-01-10 NOTE — PROGRESS NOTES
Identified pt with two pt identifiers (name and ). Reviewed chart in preparation for visit and have obtained necessary documentation.    Maria G Sanchez is a 68 y.o. female  Chief Complaint   Patient presents with    Weight Management     1 month follow up     /88 (Site: Right Upper Arm, Position: Sitting, Cuff Size: Large Adult) Comment: manual  Pulse 61   Temp 98.2 °F (36.8 °C) (Oral)   Resp 16   Ht 1.549 m (5' 1\")   Wt 98.2 kg (216 lb 6.4 oz)   SpO2 98%   BMI 40.89 kg/m²     1. Have you been to the ER, urgent care clinic since your last visit?  Hospitalized since your last visit?yes - for COVID test on Maura Ester - negative    2. Have you seen or consulted any other health care providers outside of the [facility] since your last visit?  Include any pap smears or colon screening. No    BMI - 41.0    Patient and provider made aware of elevated BP x2. Patient asymptomatic. Patient reminded to monitor BP, continue to take BP medications if prescribed, and follow up with PCP/Cardiologist.  Patient expressed understanding and agreement.

## 2024-01-10 NOTE — PROGRESS NOTES
[facility]     Subjective    Maria G Sanchez is a 68 y.o. female who presents today for the following: patient was seen for a follow up at the medical weight center.     Is not down in any weight. She did agree to begin working with our behavioral specialist. She has a follow up in a few weeks. She tends to over eat and eat when she has nothing to do. She has not been getting out much due to her knee pain and holidays.     She has decreased her soda intake. She drinks at least 2 cups of coffee with splenda and a splash of sweetener.     HTN: has been stable. But she has not changed her salt intake   Chief Complaint   Patient presents with    Weight Management     1 month follow up           PMH/PSH/Allergies/Social History/medication list and most recent studies reviewed with patient.     reports that she has never smoked. She has never used smokeless tobacco.    reports current alcohol use.     Vitals:    01/10/24 0830   BP: 130/88   Pulse:    Resp:    Temp:    SpO2:       Past Medical History:   Diagnosis Date    Asthma     Diabetes ([facility])     reports she is borderline diabetic     Hypertension     Kidney stone     Left ureteral stone     Renal colic on left side     Stroke ([facility]) 10/10/2022       No Known Allergies     Current Outpatient Medications on File Prior to Visit   Medication Sig Dispense Refill    furosemide (LASIX) 20 MG tablet Take 1 tablet by mouth daily      montelukast (SINGULAIR) 10 MG tablet Take 1 tablet by mouth nightly      Cholecalciferol (VITAMIN D3) 50 MCG (2000 UT) CAPS Take by mouth      ibuprofen (ADVIL;MOTRIN) 800 MG tablet TAKE 1 TABLET BY MOUTH EVERY 6 TO 8 HOURS AS NEEDED      vitamin B-12 500 MCG tablet Take 2 tablets by mouth daily 2 tablet 0    Cholecalciferol (VITAMIN D3) 50 MCG (2000 UT) TABS TAKE 1 TABLET BY MOUTH DAILY 90 tablet 3    albuterol sulfate HFA (PROVENTIL;VENTOLIN;PROAIR) 108 (90 Base) MCG/ACT inhaler Inhale 2 puffs into the lungs every 4 hours

## 2024-01-31 ENCOUNTER — NURSE ONLY (OUTPATIENT)
Age: 69
End: 2024-01-31

## 2024-01-31 VITALS
DIASTOLIC BLOOD PRESSURE: 90 MMHG | HEIGHT: 61 IN | SYSTOLIC BLOOD PRESSURE: 154 MMHG | TEMPERATURE: 98 F | HEART RATE: 59 BPM | BODY MASS INDEX: 41.84 KG/M2 | OXYGEN SATURATION: 96 % | RESPIRATION RATE: 18 BRPM | WEIGHT: 221.6 LBS

## 2024-01-31 DIAGNOSIS — E66.01 MORBID OBESITY (HCC): Primary | ICD-10-CM

## 2024-01-31 ASSESSMENT — PATIENT HEALTH QUESTIONNAIRE - PHQ9
SUM OF ALL RESPONSES TO PHQ QUESTIONS 1-9: 0
SUM OF ALL RESPONSES TO PHQ9 QUESTIONS 1 & 2: 0
SUM OF ALL RESPONSES TO PHQ QUESTIONS 1-9: 0
1. LITTLE INTEREST OR PLEASURE IN DOING THINGS: 0
2. FEELING DOWN, DEPRESSED OR HOPELESS: 0
SUM OF ALL RESPONSES TO PHQ QUESTIONS 1-9: 0
SUM OF ALL RESPONSES TO PHQ QUESTIONS 1-9: 0

## 2024-01-31 NOTE — PROGRESS NOTES
Identified pt with two pt identifiers (name and ). Reviewed chart in preparation for visit and have obtained necessary documentation.    Maria G Sanchez is a 68 y.o. female  Chief Complaint   Patient presents with    Weight Management     BP (!) 154/90 (Site: Left Upper Arm, Position: Sitting, Cuff Size: Large Adult) Comment: manual  Pulse 59   Temp 98 °F (36.7 °C) (Oral)   Resp 18   Ht 1.549 m (5' 1\")   SpO2 96%   BMI 40.89 kg/m²     1. Have you been to the ER, urgent care clinic since your last visit?  Hospitalized since your last visit?no    2. Have you seen or consulted any other health care providers outside of the [facility] since your last visit?  Include any pap smears or colon screening. No    Patient and provider made aware of elevated BP x2. Patient asymptomatic. Patient reminded to monitor BP, continue to take BP medications if prescribed, and follow up with PCP/Cardiologist.  Patient expressed understanding and agreement.        Patient has not taken her BP medication yet this morning.

## 2024-01-31 NOTE — PROGRESS NOTES
Progress Note: Weekly Education Class in the [facility] Weight Loss Program         Patient is on Very Low Calorie Diet [] (4 meal replacements per day, 800 kcal/day)      Low Calorie Diet [x] (2-3 meal replacements per day, 5729-7777 kcal/day)    1) Did patient have any new symptoms or physical problems?   Yes []    No [x]    If yes, check & comment: weakness [], fatigue [], lightheadedness [], headache [], cramps [], cold intolerance [], hair loss [], diarrhea [], constipation [],  NA [] other:                                 2) Has patient had any medical attention from other providers, urgent care or the emergency room this week?  Yes []  No [x]       NA [], If yes, why:                                       3) Any other sugar sweetened beverages consumed this week?   Yes [x]  No []    4) Did patient have any problems adhering to the diet? Yes [x]  No [] NA []    If yes, Vacation [], Celebrations [], Conferences [], Family Reunions [] other: no will power                                               5) How many hours of sleep this week? 5-7    (range)  NA []    Number of meal replacements consumed daily? 1-3 (range)  NA []    Average ounces of water patient consumed daily this week (not including shakes)? 27     (divide the weekly total by 7)    Did you eat any food outside of the program? Yes [] No [x]    Physical Activity Over the Past Week:    Cardio exercise: 0 min  Strength exercise: 0 workouts / week  Number of steps walked per day: 4381-4548    How has patient mood overall been this week? Sad [], Happy [], Stressed [], Tired [], Content [], NA [], other NOT ANSWERED             Medications reconciled by nurse Yes [x]  No[]    Patient was given therapeutic recommendations for any noted side effects of their dietary approach based upon [facility] patient manual per providers recommendation.

## 2024-02-05 ENCOUNTER — OFFICE VISIT (OUTPATIENT)
Facility: CLINIC | Age: 69
End: 2024-02-05
Payer: MEDICARE

## 2024-02-05 VITALS
SYSTOLIC BLOOD PRESSURE: 139 MMHG | TEMPERATURE: 98.1 F | BODY MASS INDEX: 41.47 KG/M2 | WEIGHT: 219.5 LBS | DIASTOLIC BLOOD PRESSURE: 89 MMHG | HEART RATE: 80 BPM | RESPIRATION RATE: 18 BRPM

## 2024-02-05 DIAGNOSIS — Z00.00 ENCOUNTER FOR MEDICARE ANNUAL WELLNESS EXAM: ICD-10-CM

## 2024-02-05 DIAGNOSIS — M15.9 PRIMARY OSTEOARTHRITIS INVOLVING MULTIPLE JOINTS: ICD-10-CM

## 2024-02-05 DIAGNOSIS — E66.01 MORBID OBESITY (HCC): ICD-10-CM

## 2024-02-05 DIAGNOSIS — Z98.84 BARIATRIC SURGERY STATUS: ICD-10-CM

## 2024-02-05 DIAGNOSIS — R42 DIZZINESS: ICD-10-CM

## 2024-02-05 DIAGNOSIS — Q21.12 PATENT FORAMEN OVALE: ICD-10-CM

## 2024-02-05 DIAGNOSIS — J45.50 SEVERE PERSISTENT ASTHMA WITHOUT COMPLICATION: ICD-10-CM

## 2024-02-05 DIAGNOSIS — E78.5 HYPERLIPIDEMIA, UNSPECIFIED HYPERLIPIDEMIA TYPE: ICD-10-CM

## 2024-02-05 DIAGNOSIS — I10 PRIMARY HYPERTENSION: Primary | ICD-10-CM

## 2024-02-05 PROCEDURE — G8399 PT W/DXA RESULTS DOCUMENT: HCPCS | Performed by: INTERNAL MEDICINE

## 2024-02-05 PROCEDURE — 3017F COLORECTAL CA SCREEN DOC REV: CPT | Performed by: INTERNAL MEDICINE

## 2024-02-05 PROCEDURE — G8484 FLU IMMUNIZE NO ADMIN: HCPCS | Performed by: INTERNAL MEDICINE

## 2024-02-05 PROCEDURE — 99214 OFFICE O/P EST MOD 30 MIN: CPT | Performed by: INTERNAL MEDICINE

## 2024-02-05 PROCEDURE — 1123F ACP DISCUSS/DSCN MKR DOCD: CPT | Performed by: INTERNAL MEDICINE

## 2024-02-05 PROCEDURE — G8427 DOCREV CUR MEDS BY ELIG CLIN: HCPCS | Performed by: INTERNAL MEDICINE

## 2024-02-05 PROCEDURE — G8417 CALC BMI ABV UP PARAM F/U: HCPCS | Performed by: INTERNAL MEDICINE

## 2024-02-05 PROCEDURE — 3079F DIAST BP 80-89 MM HG: CPT | Performed by: INTERNAL MEDICINE

## 2024-02-05 PROCEDURE — 1036F TOBACCO NON-USER: CPT | Performed by: INTERNAL MEDICINE

## 2024-02-05 PROCEDURE — 1090F PRES/ABSN URINE INCON ASSESS: CPT | Performed by: INTERNAL MEDICINE

## 2024-02-05 PROCEDURE — G0439 PPPS, SUBSEQ VISIT: HCPCS | Performed by: INTERNAL MEDICINE

## 2024-02-05 PROCEDURE — 3075F SYST BP GE 130 - 139MM HG: CPT | Performed by: INTERNAL MEDICINE

## 2024-02-05 ASSESSMENT — PATIENT HEALTH QUESTIONNAIRE - PHQ9
SUM OF ALL RESPONSES TO PHQ9 QUESTIONS 1 & 2: 0
SUM OF ALL RESPONSES TO PHQ QUESTIONS 1-9: 0
1. LITTLE INTEREST OR PLEASURE IN DOING THINGS: 0
SUM OF ALL RESPONSES TO PHQ QUESTIONS 1-9: 0
SUM OF ALL RESPONSES TO PHQ QUESTIONS 1-9: 0
2. FEELING DOWN, DEPRESSED OR HOPELESS: 0
SUM OF ALL RESPONSES TO PHQ QUESTIONS 1-9: 0

## 2024-02-05 NOTE — PROGRESS NOTES
Maria G Sanchez is a 68 y.o. female  Chief Complaint   Patient presents with    Hypertension    Other     Pt states her walking is getting bad.    Diabetes     Pt states she thinks her diabetes might be back     /89   Pulse 80   Temp 98.1 °F (36.7 °C) (Temporal)   Resp 18   Wt 99.6 kg (219 lb 8 oz)   BMI 41.47 kg/m²   1. Have you been to the ER, urgent care clinic since your last visit?  Hospitalized since your last visit?No    2. Have you seen or consulted any other health care providers outside of the [facility] since your last visit?  Include any pap smears or colon screening. No

## 2024-02-05 NOTE — PATIENT INSTRUCTIONS
the bathroom with you.   Where can you learn more?  Go to [email or web address] and enter G117 to learn more about \"Preventing Falls: Care Instructions.\"  Current as of: July 18, 2023               Content Version: 13.9  © 2656-8298 [employer].   Care instructions adapted under license by [employer]. If you have questions about a medical condition or this instruction, always ask your healthcare professional. [employer] disclaims any warranty or liability for your use of this information.           Starting a Weight Loss Plan: Care Instructions  Overview     If you're thinking about losing weight, it can be hard to know where to start. Your doctor can help you set up a weight loss plan that best meets your needs. You may want to take a class on nutrition or exercise, or you could join a weight loss support group. If you have questions about how to make changes to your eating or exercise habits, ask your doctor about seeing a registered dietitian or an exercise specialist.  It can be a big challenge to lose weight. But you don't have to make huge changes at once. Make small changes, and stick with them. When those changes become habit, add a few more changes.  If you don't think you're ready to make changes right now, try to pick a date in the future. Make an appointment to see your doctor to discuss whether the time is right for you to start a plan.  Follow-up care is a key part of your treatment and safety. Be sure to make and go to all appointments, and call your doctor if you are having problems. It's also a good idea to know your test results and keep a list of the medicines you take.  How can you care for yourself at home?  Set realistic goals. Many people expect to lose much more weight than is likely. A weight loss of 5% to 10% of your body weight may be enough to improve your health.  Get family and friends involved to provide support. Talk to them about why you

## 2024-02-05 NOTE — PROGRESS NOTES
Maria G Sanchez is a 67 y.o. female and presents with   Chief Complaint   Patient presents with    Hypertension    Other     Pt states her walking is getting bad.    Diabetes     Pt states she thinks her diabetes might be back        .  Subjective:    (mother is Shawna Haines)  Moved from [place] 2/5/2022        Pts meera le weakness since TIA x 2 has improved post PT. She has been very sedentary due to the cold weather and she requests another round of PT    Chronic le pain. Told she has lumbar DJD (vs DDD).     Pt is enrolled in [facility] medical weight loss program.    Pt requests neuro referral due to chronic dizziness. She was told by her cardiologist it is not cardiac in origin.      PMH- HTN  BP Readings from Last 3 Encounters:   02/05/24 139/89   01/31/24 (!) 154/90   01/10/24 130/88         Asthma- Dr. Sánchez Randolph[facility]     HLD-on atorvastatin 40mg  Lab Results   Component Value Date    CHOL 207 (H) 11/15/2023    TRIG 47 11/15/2023    HDL 97 11/15/2023    LDLCALC 101 (H) 11/15/2023    VLDL 12 03/23/2022    CHOLHDLRATIO 1.8 02/14/2023       PFO-no surgical closure indicated as per cards      Vit B12 deficiency  Lab Results   Component Value Date    WGBCYHPL54 1353 (H) 08/16/2023       OA     Osteoporosis      H/o TIA     H/o kidney stones     PSH- meera TKR   Gastric bypass  Wt Readings from Last 3 Encounters:   02/05/24 99.6 kg (219 lb 8 oz)   01/31/24 100.5 kg (221 lb 9.6 oz)   01/10/24 98.2 kg (216 lb 6.4 oz)        SH-   Lives alone   2 daughters and 4 grandchildren    FH- 6 siblings    HM  mammo- UTD  Colonoscopy- 1/22/2019  Immunizations  Eye care ~ 2 yrs ago  Dental care  BMD UTD      Review of Systems  Constitutional: negative for fevers, chills, anorexia and weight loss  Eyes:   negative for visual disturbance and irritation  ENT:   negative for tinnitus,sore throat,nasal congestion,ear pains.hoarseness  Respiratory:  negative for cough, hemoptysis, dyspnea,wheezing  CV:

## 2024-02-09 ENCOUNTER — TELEPHONE (OUTPATIENT)
Facility: CLINIC | Age: 69
End: 2024-02-09

## 2024-02-09 NOTE — TELEPHONE ENCOUNTER
----- Message from Shania Orr sent at 2/9/2024  9:03 AM EST -----  Subject: Referral Request    Reason for referral request? Patient says she spoke to someone earlier   this week about getting a copy of her Medical Record and copy of referral   sent to Neurology at [facility], Dr. Reina - [address]. Send attn to Nurse Rodrigez. Patient did not have Fax #. Phone: [phone]. Please call to advise.   Provider patient wants to be referred to(if known):     Provider Phone Number(if known):    Additional Information for Provider?   ---------------------------------------------------------------------------  --------------  CALL BACK INFO    [phone]; OK to leave message on voicemail  ---------------------------------------------------------------------------  --------------

## 2024-02-12 ENCOUNTER — OFFICE VISIT (OUTPATIENT)
Age: 69
End: 2024-02-12

## 2024-02-12 ENCOUNTER — TELEPHONE (OUTPATIENT)
Facility: CLINIC | Age: 69
End: 2024-02-12

## 2024-02-12 ENCOUNTER — TELEPHONE (OUTPATIENT)
Age: 69
End: 2024-02-12

## 2024-02-12 DIAGNOSIS — E66.01 MORBID OBESITY (HCC): Primary | ICD-10-CM

## 2024-02-12 NOTE — TELEPHONE ENCOUNTER
----- Message from Shania Orr sent at 2/9/2024  9:03 AM EST -----  Subject: Referral Request    Reason for referral request? Patient says she spoke to someone earlier   this week about getting a copy of her Medical Record and copy of referral   sent to Neurology at [facility], Dr. Reina - [address]. Send attn to Nurse Rodrigez. Patient did not have Fax #. Phone: [phone]. Please call to advise.   Provider patient wants to be referred to(if known):     Provider Phone Number(if known):    Additional Information for Provider?   ---------------------------------------------------------------------------  --------------  CALL BACK INFO    [phone]; OK to leave message on voicemail  ---------------------------------------------------------------------------  --------------

## 2024-02-12 NOTE — PROGRESS NOTES
[facility]  Metabolic Program Follow-up Nutrition Consult    Date: 2024   Physician: Ursula Wan NP  Name: Maria G Sanchez  :  1955    Type of Plan: LCD  Weeks on Plan: 13 months  Virtual visit was completed through Zoom.    ASSESSMENT:    Medications/Supplements:   Prior to Admission medications    Medication Sig Start Date End Date Taking? Authorizing Provider   furosemide (LASIX) 20 MG tablet Take 1 tablet by mouth daily    Jatin Collins MD   montelukast (SINGULAIR) 10 MG tablet Take 1 tablet by mouth nightly    Jatin Collins MD   Cholecalciferol (VITAMIN D3) 50 MCG ( UT) CAPS Take by mouth    Jatin Collins MD   vitamin B-12 500 MCG tablet Take 2 tablets by mouth daily 23   Rissa Hamilton MD   Cholecalciferol (VITAMIN D3) 50 MCG ( UT) TABS TAKE 1 TABLET BY MOUTH DAILY 23   Rissa Hamilton MD   albuterol sulfate HFA (PROVENTIL;VENTOLIN;PROAIR) 108 (90 Base) MCG/ACT inhaler Inhale 2 puffs into the lungs every 4 hours as needed 17   Jatin Collins MD   albuterol (PROVENTIL) (2.5 MG/3ML) 0.083% nebulizer solution Inhale 3 mLs into the lungs    Jatin Collins MD   amLODIPine-benazepril (LOTREL) 5-20 MG per capsule Take 1 capsule by mouth daily 3/28/23   Jatin Collins MD   atorvastatin (LIPITOR) 40 MG tablet Take 1 tablet by mouth nightly 3/22/23   Jatin Collins MD   aspirin 81 MG chewable tablet Take 1 tablet by mouth daily    Jatin Collins MD   montelukast (SINGULAIR) 10 MG tablet Take 1 tablet by mouth nightly 2/15/19   Jatin Collins MD   tiotropium (SPIRIVA RESPIMAT) 1.25 MCG/ACT AERS inhaler Inhale 2 puffs into the lungs daily    Jatin Collins MD   mepolizumab (NUCALA) 100 MG/ML SOSY injection Inject 1 mL into the skin every 30 days    Jatin Collins MD   fluticasone furoate-vilanterol (BREO ELLIPTA) 200-25 MCG/ACT AEPB inhaler Inhale 1 puff into the

## 2024-02-13 ENCOUNTER — TELEPHONE (OUTPATIENT)
Facility: CLINIC | Age: 69
End: 2024-02-13

## 2024-02-13 NOTE — TELEPHONE ENCOUNTER
----- Message from Shania Orr sent at 2/9/2024  9:03 AM EST -----  Subject: Referral Request    Reason for referral request? Patient says she spoke to someone earlier   this week about getting a copy of her Medical Record and copy of referral   sent to Neurology at [facility], Dr. Reina - [address]. Send attn to Nurse Rodrigez. Patient did not have Fax #. Phone: [phone]. Please call to advise.   Provider patient wants to be referred to(if known):     Provider Phone Number(if known):    Additional Information for Provider?   ---------------------------------------------------------------------------  --------------  CALL BACK INFO    [phone]; OK to leave message on voicemail  ---------------------------------------------------------------------------  --------------

## 2024-02-14 ENCOUNTER — TELEPHONE (OUTPATIENT)
Facility: CLINIC | Age: 69
End: 2024-02-14

## 2024-02-14 ENCOUNTER — OFFICE VISIT (OUTPATIENT)
Age: 69
End: 2024-02-14
Payer: MEDICARE

## 2024-02-14 DIAGNOSIS — E66.01 MORBID OBESITY (HCC): ICD-10-CM

## 2024-02-14 DIAGNOSIS — F50.9 EATING DISORDER, UNSPECIFIED TYPE: Primary | ICD-10-CM

## 2024-02-14 PROCEDURE — 90837 PSYTX W PT 60 MINUTES: CPT | Performed by: COUNSELOR

## 2024-02-14 PROCEDURE — 1123F ACP DISCUSS/DSCN MKR DOCD: CPT | Performed by: COUNSELOR

## 2024-02-14 NOTE — TELEPHONE ENCOUNTER
----- Message from Shania Orr sent at 2/9/2024  9:03 AM EST -----  Subject: Referral Request    Reason for referral request? Patient says she spoke to someone earlier   this week about getting a copy of her Medical Record and copy of referral   sent to Neurology at [facility], Dr. Reina - [address]. Send attn to Nurse Rodrigez. Patient did not have Fax #. Phone: [phone]. Please call to advise.   Provider patient wants to be referred to(if known):     Provider Phone Number(if known):    Additional Information for Provider?   ---------------------------------------------------------------------------  --------------  CALL BACK INFO    [phone]; OK to leave message on voicemail  ---------------------------------------------------------------------------  --------------

## 2024-02-14 NOTE — PROGRESS NOTES
[facility]  Counseling Session Note      This [facility] not be viewable [facility] for the following reason(s). This is a Psychotherapy Note. (Behavorial Health Providers Only)      Name: Maria G Sanchez                                                :  1955    Gender:  female    Marital Status:                                     Race/Ethnicity:  BLACK/       Date:  24    Time Start: 8:48 AM    Time End:  9:58 AM   CPT code:  05192   Virtual:  N          Chief Complaint/Presenting Problem:  Maria G continues to express frustration with eating habits.  She has gone from 240 lbs to 213 lbs. But now has gained back about 5 lbs (218 lbs). She is concerned that she will continue to gain weight and wants an \"off button\" to stop eating.  She discussed childhood comfort and love connected with being able to eat whatever she wanted.  Her kryptonite is currently cookies which she keeps for the grandkids, but eats them herself as well.  She continues to do activities with the other older people in her community and today is going to see the [employer] with a group.  She continues to       Current Symptoms:  Feeling of a lack of control in her eating      Objective (factual account of what was observed, mental health status):   Maria G Sanchez is a 67 y.o. female who is alert and oriented X3.  She does not have any suicidal or homicidal ideations.  Patient is not psychotic or delusional.   She has not been psychiatrically admitted to a hospital. Ms. Sanchez does have good eye  contact during this interview. She is verbal and engaging.  Patient does have good insight and judgement.  She is a good candidate for short term therapy to address the above issues.        Clinical Intervention:  This therapist engaged the client in processing her frustration with her eating habits, her family dynamics and how this contributes to her eating.  This therapist also discussed with

## 2024-03-06 ENCOUNTER — OFFICE VISIT (OUTPATIENT)
Age: 69
End: 2024-03-06
Payer: MEDICARE

## 2024-03-06 VITALS
TEMPERATURE: 98.5 F | BODY MASS INDEX: 40.75 KG/M2 | OXYGEN SATURATION: 97 % | SYSTOLIC BLOOD PRESSURE: 130 MMHG | WEIGHT: 215.8 LBS | HEIGHT: 61 IN | RESPIRATION RATE: 16 BRPM | HEART RATE: 72 BPM | DIASTOLIC BLOOD PRESSURE: 82 MMHG

## 2024-03-06 DIAGNOSIS — E66.01 MORBID OBESITY (HCC): Primary | ICD-10-CM

## 2024-03-06 DIAGNOSIS — I10 PRIMARY HYPERTENSION: ICD-10-CM

## 2024-03-06 PROCEDURE — 1036F TOBACCO NON-USER: CPT | Performed by: INTERNAL MEDICINE

## 2024-03-06 PROCEDURE — 1090F PRES/ABSN URINE INCON ASSESS: CPT | Performed by: INTERNAL MEDICINE

## 2024-03-06 PROCEDURE — 3075F SYST BP GE 130 - 139MM HG: CPT | Performed by: INTERNAL MEDICINE

## 2024-03-06 PROCEDURE — 3017F COLORECTAL CA SCREEN DOC REV: CPT | Performed by: INTERNAL MEDICINE

## 2024-03-06 PROCEDURE — 3079F DIAST BP 80-89 MM HG: CPT | Performed by: INTERNAL MEDICINE

## 2024-03-06 PROCEDURE — 1123F ACP DISCUSS/DSCN MKR DOCD: CPT | Performed by: INTERNAL MEDICINE

## 2024-03-06 PROCEDURE — G8427 DOCREV CUR MEDS BY ELIG CLIN: HCPCS | Performed by: INTERNAL MEDICINE

## 2024-03-06 PROCEDURE — 99214 OFFICE O/P EST MOD 30 MIN: CPT | Performed by: INTERNAL MEDICINE

## 2024-03-06 PROCEDURE — G8484 FLU IMMUNIZE NO ADMIN: HCPCS | Performed by: INTERNAL MEDICINE

## 2024-03-06 PROCEDURE — G8399 PT W/DXA RESULTS DOCUMENT: HCPCS | Performed by: INTERNAL MEDICINE

## 2024-03-06 PROCEDURE — G8417 CALC BMI ABV UP PARAM F/U: HCPCS | Performed by: INTERNAL MEDICINE

## 2024-03-06 RX ORDER — PREDNISONE 20 MG/1
20 TABLET ORAL DAILY
COMMUNITY
Start: 2024-02-21

## 2024-03-06 RX ORDER — PREDNISONE 10 MG/1
10 TABLET ORAL DAILY
COMMUNITY
Start: 2024-02-26

## 2024-03-06 RX ORDER — CODEINE PHOSPHATE AND GUAIFENESIN 10; 100 MG/5ML; MG/5ML
5 SOLUTION ORAL AS NEEDED
COMMUNITY
Start: 2024-02-26

## 2024-03-06 RX ORDER — DOXYCYCLINE HYCLATE 100 MG/1
100 CAPSULE ORAL EVERY 12 HOURS
COMMUNITY
Start: 2024-02-21

## 2024-03-06 RX ORDER — AMOXICILLIN AND CLAVULANATE POTASSIUM 875; 125 MG/1; MG/1
1 TABLET, FILM COATED ORAL 2 TIMES DAILY
COMMUNITY
Start: 2024-02-26

## 2024-03-06 ASSESSMENT — PATIENT HEALTH QUESTIONNAIRE - PHQ9
SUM OF ALL RESPONSES TO PHQ QUESTIONS 1-9: 0
2. FEELING DOWN, DEPRESSED OR HOPELESS: 0
1. LITTLE INTEREST OR PLEASURE IN DOING THINGS: 0
SUM OF ALL RESPONSES TO PHQ QUESTIONS 1-9: 0
SUM OF ALL RESPONSES TO PHQ9 QUESTIONS 1 & 2: 0

## 2024-03-06 ASSESSMENT — ENCOUNTER SYMPTOMS: RESPIRATORY NEGATIVE: 1

## 2024-03-06 NOTE — PROGRESS NOTES
Progress Note: Weekly Education Class in the [facility] Weight Loss Program         Patient is on Very Low Calorie Diet [] (4 meal replacements per day, 800 kcal/day)      Low Calorie Diet [x] (2-3 meal replacements per day, 6719-1645 kcal/day)    1) Did patient have any new symptoms or physical problems?   Yes []    No []    If yes, check & comment: weakness [x], fatigue [], lightheadedness [], headache [x], cramps [], cold intolerance [], hair loss [], diarrhea [], constipation [],  NA [] other: bronchitis                                2) Has patient had any medical attention from other providers, urgent care or the emergency room this week?  Yes [x]  No []       NA [], If yes, why: very bad cold/bronchitis                                      3) Any other sugar sweetened beverages consumed this week?   Yes [x]  No []    4) Did patient have any problems adhering to the diet? Yes [x]  No [] NA []    If yes, Vacation [], Celebrations [x], Conferences [], Family Reunions [x] other:                                                 5) How many hours of sleep this week? 5-8    (range)  NA []    Number of meal replacements consumed daily? 1-2 (range)  NA []    Average ounces of water patient consumed daily this week (not including shakes)? 29     (divide the weekly total by 7)    Did you eat any food outside of the program? Yes [] No []    Physical Activity Over the Past Week:    Cardio exercise:   min  Strength exercise:   workouts / week  Number of steps walked per day: 37783    How has patient mood overall been this week? Sad [], Happy [], Stressed [], Tired [], Content [x], NA [], other sick           Medications reconciled by nurse Yes [x]  No[]    Patient was given therapeutic recommendations for any noted side effects of their dietary approach based upon [facility] patient manual per providers recommendation.   
Identified pt with two pt identifiers (name and ). Reviewed chart in preparation for visit and have obtained necessary documentation.    Maria G Sanchez is a 68 y.o. female  Chief Complaint   Patient presents with    Weight Management     1 month      /82 (Site: Left Upper Arm, Position: Sitting, Cuff Size: Large Adult)   Pulse 72   Temp 98.5 °F (36.9 °C) (Oral)   Resp 16   Ht 1.549 m (5' 1\")   Wt 97.9 kg (215 lb 12.8 oz)   SpO2 97%   BMI 40.78 kg/m²     1. Have you been to the ER, urgent care clinic since your last visit?  Hospitalized since your last visit?yes - [facility]-     2. Have you seen or consulted any other health care providers outside of the [facility] since your last visit?  Include any pap smears or colon screening. No- Pulmonary    
capsule in the evening.      guaiFENesin-codeine (GUAIFENESIN AC) 100-10 MG/5ML liquid Take 5 mLs by mouth as needed.      predniSONE (DELTASONE) 10 MG tablet Take 1 tablet by mouth daily      predniSONE (DELTASONE) 20 MG tablet Take 1 tablet by mouth daily      furosemide (LASIX) 20 MG tablet Take 1 tablet by mouth daily      montelukast (SINGULAIR) 10 MG tablet Take 1 tablet by mouth nightly      Cholecalciferol (VITAMIN D3) 50 MCG (2000 UT) CAPS Take by mouth      Cholecalciferol (VITAMIN D3) 50 MCG (2000 UT) TABS TAKE 1 TABLET BY MOUTH DAILY 90 tablet 3    albuterol sulfate HFA (PROVENTIL;VENTOLIN;PROAIR) 108 (90 Base) MCG/ACT inhaler Inhale 2 puffs into the lungs every 4 hours as needed      albuterol (PROVENTIL) (2.5 MG/3ML) 0.083% nebulizer solution Inhale 3 mLs into the lungs      amLODIPine-benazepril (LOTREL) 5-20 MG per capsule Take 1 capsule by mouth daily      atorvastatin (LIPITOR) 40 MG tablet Take 1 tablet by mouth nightly      aspirin 81 MG chewable tablet Take 1 tablet by mouth daily      montelukast (SINGULAIR) 10 MG tablet Take 1 tablet by mouth nightly      tiotropium (SPIRIVA RESPIMAT) 1.25 MCG/ACT AERS inhaler Inhale 2 puffs into the lungs daily      mepolizumab (NUCALA) 100 MG/ML SOSY injection Inject 1 mL into the skin every 30 days      fluticasone furoate-vilanterol (BREO ELLIPTA) 200-25 MCG/ACT AEPB inhaler Inhale 1 puff into the lungs as needed      azelastine (ASTELIN) 0.1 % nasal spray 2 sprays by Nasal route 2 times daily      fluticasone (FLONASE) 50 MCG/ACT nasal spray 2 sprays by Nasal route daily as needed      ferrous sulfate (IRON 325) 325 (65 Fe) MG tablet Take 1 tablet by mouth every morning (before breakfast)      calcium carbonate 600 MG TABS tablet Take 1 tablet by mouth nightly      Multiple Vitamins-Minerals (ONE-A-DAY WOMENS 50+ PO) Take 1 tablet by mouth daily       No current facility-administered medications on file prior to visit.           Review of Systems

## 2024-03-18 ENCOUNTER — OFFICE VISIT (OUTPATIENT)
Age: 69
End: 2024-03-18

## 2024-03-18 DIAGNOSIS — E66.01 MORBID OBESITY (HCC): Primary | ICD-10-CM

## 2024-03-18 NOTE — PROGRESS NOTES
[facility]  Metabolic Program Follow-up Nutrition Consult    Date: 3/18/2024   Physician: Ursula Wan NP  Name: Maria G Sanchez  :  1955    Type of Plan: LCD  Weeks on Plan: 14 months  Virtual visit was completed through Zoom.    ASSESSMENT:    Medications/Supplements:   Prior to Admission medications    Medication Sig Start Date End Date Taking? Authorizing Provider   amoxicillin-clavulanate (AUGMENTIN) 875-125 MG per tablet Take 1 tablet by mouth 2 times daily 24   Jatin Collins MD   doxycycline hyclate (VIBRAMYCIN) 100 MG capsule Take 1 capsule by mouth in the morning and 1 capsule in the evening. 24   Jatin Collins MD   guaiFENesin-codeine (GUAIFENESIN AC) 100-10 MG/5ML liquid Take 5 mLs by mouth as needed. 24   Jatin Collins MD   predniSONE (DELTASONE) 10 MG tablet Take 1 tablet by mouth daily 24   Jatin Collins MD   predniSONE (DELTASONE) 20 MG tablet Take 1 tablet by mouth daily 24   Jatin Collins MD   furosemide (LASIX) 20 MG tablet Take 1 tablet by mouth daily    Jatin Collins MD   montelukast (SINGULAIR) 10 MG tablet Take 1 tablet by mouth nightly    Jatin Collins MD   Cholecalciferol (VITAMIN D3) 50 MCG ( UT) CAPS Take by mouth    Jatin Collins MD   Cholecalciferol (VITAMIN D3) 50 MCG ( UT) TABS TAKE 1 TABLET BY MOUTH DAILY 23   Rissa Hamilton MD   albuterol sulfate HFA (PROVENTIL;VENTOLIN;PROAIR) 108 (90 Base) MCG/ACT inhaler Inhale 2 puffs into the lungs every 4 hours as needed 17   Jatin Collins MD   albuterol (PROVENTIL) (2.5 MG/3ML) 0.083% nebulizer solution Inhale 3 mLs into the lungs    Jatin Collins MD   amLODIPine-benazepril (LOTREL) 5-20 MG per capsule Take 1 capsule by mouth daily 3/28/23   Jatin Collins MD   atorvastatin (LIPITOR) 40 MG tablet Take 1 tablet by mouth nightly 3/22/23   Jatin Collins MD   aspirin

## 2024-03-20 ENCOUNTER — NURSE ONLY (OUTPATIENT)
Age: 69
End: 2024-03-20

## 2024-03-20 VITALS
HEART RATE: 85 BPM | DIASTOLIC BLOOD PRESSURE: 85 MMHG | HEIGHT: 61 IN | OXYGEN SATURATION: 97 % | SYSTOLIC BLOOD PRESSURE: 124 MMHG | WEIGHT: 219 LBS | RESPIRATION RATE: 20 BRPM | TEMPERATURE: 97.8 F | BODY MASS INDEX: 41.35 KG/M2

## 2024-03-20 DIAGNOSIS — E78.5 HYPERLIPIDEMIA, UNSPECIFIED HYPERLIPIDEMIA TYPE: ICD-10-CM

## 2024-03-20 DIAGNOSIS — E66.01 MORBID OBESITY (HCC): Primary | ICD-10-CM

## 2024-03-20 DIAGNOSIS — I10 PRIMARY HYPERTENSION: ICD-10-CM

## 2024-03-20 ASSESSMENT — PATIENT HEALTH QUESTIONNAIRE - PHQ9
SUM OF ALL RESPONSES TO PHQ QUESTIONS 1-9: 0
SUM OF ALL RESPONSES TO PHQ9 QUESTIONS 1 & 2: 0
SUM OF ALL RESPONSES TO PHQ QUESTIONS 1-9: 0
SUM OF ALL RESPONSES TO PHQ QUESTIONS 1-9: 0
1. LITTLE INTEREST OR PLEASURE IN DOING THINGS: NOT AT ALL
SUM OF ALL RESPONSES TO PHQ QUESTIONS 1-9: 0
2. FEELING DOWN, DEPRESSED OR HOPELESS: NOT AT ALL

## 2024-03-20 NOTE — PROGRESS NOTES
Identified patient with two patient identifiers (name and ). Reviewed chart in preparation for visit and have obtained necessary documentation.    Maria G Sanchez is a 68 y.o. female  Chief Complaint   Patient presents with    Weight Management     Triage/SAL/Carlitos     /85 (Site: Right Upper Arm, Position: Sitting, Cuff Size: Large Adult)   Pulse 85   Temp 97.8 °F (36.6 °C) (Oral)   Resp 20   Ht 1.549 m (5' 1\")   Wt 99.3 kg (219 lb)   SpO2 97%   BMI 41.38 kg/m²     1. Have you been to the ER, urgent care clinic since your last visit?  Hospitalized since your last visit?no    2. Have you seen or consulted any other health care providers outside of the [facility] since your last visit?  Include any pap smears or colon screening. No        Progress Note: Weekly Education Class in the [facility] Loss Program         Patient is on Very Low Calorie Diet [] (4 meal replacements per day, 800 kcal/day)      Low Calorie Diet [x] (2-3 meal replacements per day, 7381-2333 kcal/day)    1) Did patient have any new symptoms or physical problems?   Yes []    No [x]    If yes, check & comment: weakness [], fatigue [], lightheadedness [], headache [], cramps [], cold intolerance [], hair loss [], diarrhea [], constipation [],  NA [] other:                                  2) Has patient had any medical attention from other providers, urgent care or the emergency room this week?  Yes []  No [x]       NA [], If yes, why:                                        3) Any other sugar sweetened beverages consumed this week?   Yes [x]  No []    4) Did patient have any problems adhering to the diet? Yes [x]  No [] NA []    If yes, Vacation [], Celebrations [x], Conferences [], Family Reunions [] other:                                                 5) How many hours of sleep this week? 5-8.5    (range)  NA []    Number of meal replacements consumed daily? 0-3 (range)  NA []    Average ounces of water

## 2024-03-27 ENCOUNTER — OFFICE VISIT (OUTPATIENT)
Age: 69
End: 2024-03-27
Payer: MEDICARE

## 2024-03-27 DIAGNOSIS — F50.9 EATING DISORDER, UNSPECIFIED TYPE: Primary | ICD-10-CM

## 2024-03-27 DIAGNOSIS — E66.01 MORBID OBESITY (HCC): ICD-10-CM

## 2024-03-27 PROCEDURE — 90834 PSYTX W PT 45 MINUTES: CPT | Performed by: COUNSELOR

## 2024-03-27 PROCEDURE — 1123F ACP DISCUSS/DSCN MKR DOCD: CPT | Performed by: COUNSELOR

## 2024-03-27 NOTE — PROGRESS NOTES
[facility]  Counseling Session Note      This [facility] will not be viewable in [facility] for the following reason(s). This is a Psychotherapy Note. (Behavorial Health Providers Only)      Name: Maria G Sanchez                                                :  1955    Gender:  female    Marital Status:                                     Race/Ethnicity:  BLACK/       Date:  24    Time Start: 8:58 AM    Time End:  9:45 AM   CPT code:  99837   Virtual:  N          Chief Complaint/Presenting Problem:  Maria G continues to express some frustration with her weight.  She reports that she has not been losing weight.  However, she states that she has been very active in her neighborhood.  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  She also has been trying to buy less junk food to fill her closet with.  She states that her grandson's did not like that but then she also stated \"they will get used to it.\"  She states that she will try to find healthier treats for her family when they come to visit her.  Overall, she thinks she may still be eating too many carbs, but is willing to work on improving her diet.        Current Symptoms:  Improved mood and making some progress with her willpower and eating habits    Objective (factual account of what was observed, mental health status):   Maria G Sanchez is a 67 y.o. female who is alert and oriented X3.  She does not have any suicidal or homicidal ideations.  Patient is not psychotic or delusional.   She has not been psychiatrically admitted to a hospital. Ms. Sanchez does have good eye  contact during this interview. She is verbal and engaging.  Patient does have good insight and judgement.  She is a good candidate for short term therapy to address the above issues.        Clinical Intervention:  This therapist engaged the client in

## 2024-04-04 NOTE — PROGRESS NOTES
Nurse note from patient's weekly LCD / Maintenance class was reviewed.  Pertinent medical concerns were:   reviewed     BP Readings from Last 3 Encounters:   03/20/24 124/85   03/06/24 130/82   02/05/24 139/89       Failed to redirect to the Timeline version of the [facility].    Current Outpatient Medications   Medication Sig Dispense Refill    amoxicillin-clavulanate (AUGMENTIN) 875-125 MG per tablet Take 1 tablet by mouth 2 times daily      doxycycline hyclate (VIBRAMYCIN) 100 MG capsule Take 1 capsule by mouth in the morning and 1 capsule in the evening.      guaiFENesin-codeine (GUAIFENESIN AC) 100-10 MG/5ML liquid Take 5 mLs by mouth as needed.      predniSONE (DELTASONE) 10 MG tablet Take 1 tablet by mouth daily      predniSONE (DELTASONE) 20 MG tablet Take 1 tablet by mouth daily      furosemide (LASIX) 20 MG tablet Take 1 tablet by mouth daily      montelukast (SINGULAIR) 10 MG tablet Take 1 tablet by mouth nightly      Cholecalciferol (VITAMIN D3) 50 MCG (2000 UT) CAPS Take by mouth      Cholecalciferol (VITAMIN D3) 50 MCG (2000 UT) TABS TAKE 1 TABLET BY MOUTH DAILY 90 tablet 3    albuterol sulfate HFA (PROVENTIL;VENTOLIN;PROAIR) 108 (90 Base) MCG/ACT inhaler Inhale 2 puffs into the lungs every 4 hours as needed      albuterol (PROVENTIL) (2.5 MG/3ML) 0.083% nebulizer solution Inhale 3 mLs into the lungs      amLODIPine-benazepril (LOTREL) 5-20 MG per capsule Take 1 capsule by mouth daily      atorvastatin (LIPITOR) 40 MG tablet Take 1 tablet by mouth nightly      aspirin 81 MG chewable tablet Take 1 tablet by mouth daily      montelukast (SINGULAIR) 10 MG tablet Take 1 tablet by mouth nightly      tiotropium (SPIRIVA RESPIMAT) 1.25 MCG/ACT AERS inhaler Inhale 2 puffs into the lungs daily      mepolizumab (NUCALA) 100 MG/ML SOSY injection Inject 1 mL into the skin every 30 days      fluticasone furoate-vilanterol (BREO ELLIPTA) 200-25 MCG/ACT AEPB inhaler Inhale 1 puff into the lungs as needed

## 2024-04-11 ENCOUNTER — OFFICE VISIT (OUTPATIENT)
Age: 69
End: 2024-04-11
Payer: MEDICARE

## 2024-04-11 VITALS
OXYGEN SATURATION: 97 % | HEART RATE: 63 BPM | WEIGHT: 218.4 LBS | DIASTOLIC BLOOD PRESSURE: 80 MMHG | TEMPERATURE: 97.7 F | RESPIRATION RATE: 20 BRPM | BODY MASS INDEX: 41.23 KG/M2 | SYSTOLIC BLOOD PRESSURE: 132 MMHG | HEIGHT: 61 IN

## 2024-04-11 DIAGNOSIS — I10 PRIMARY HYPERTENSION: ICD-10-CM

## 2024-04-11 DIAGNOSIS — E78.5 HYPERLIPIDEMIA, UNSPECIFIED HYPERLIPIDEMIA TYPE: ICD-10-CM

## 2024-04-11 DIAGNOSIS — E66.01 CLASS 3 SEVERE OBESITY DUE TO EXCESS CALORIES WITH SERIOUS COMORBIDITY AND BODY MASS INDEX (BMI) OF 40.0 TO 44.9 IN ADULT (HCC): ICD-10-CM

## 2024-04-11 DIAGNOSIS — E66.01 CLASS 3 SEVERE OBESITY DUE TO EXCESS CALORIES WITH SERIOUS COMORBIDITY AND BODY MASS INDEX (BMI) OF 40.0 TO 44.9 IN ADULT (HCC): Primary | ICD-10-CM

## 2024-04-11 DIAGNOSIS — R73.9 BLOOD GLUCOSE ELEVATED: ICD-10-CM

## 2024-04-11 PROCEDURE — 1123F ACP DISCUSS/DSCN MKR DOCD: CPT | Performed by: FAMILY MEDICINE

## 2024-04-11 PROCEDURE — 3079F DIAST BP 80-89 MM HG: CPT | Performed by: FAMILY MEDICINE

## 2024-04-11 PROCEDURE — 1036F TOBACCO NON-USER: CPT | Performed by: FAMILY MEDICINE

## 2024-04-11 PROCEDURE — G8417 CALC BMI ABV UP PARAM F/U: HCPCS | Performed by: FAMILY MEDICINE

## 2024-04-11 PROCEDURE — G8427 DOCREV CUR MEDS BY ELIG CLIN: HCPCS | Performed by: FAMILY MEDICINE

## 2024-04-11 PROCEDURE — 3017F COLORECTAL CA SCREEN DOC REV: CPT | Performed by: FAMILY MEDICINE

## 2024-04-11 PROCEDURE — 99214 OFFICE O/P EST MOD 30 MIN: CPT | Performed by: FAMILY MEDICINE

## 2024-04-11 PROCEDURE — 1090F PRES/ABSN URINE INCON ASSESS: CPT | Performed by: FAMILY MEDICINE

## 2024-04-11 PROCEDURE — G8399 PT W/DXA RESULTS DOCUMENT: HCPCS | Performed by: FAMILY MEDICINE

## 2024-04-11 PROCEDURE — 3075F SYST BP GE 130 - 139MM HG: CPT | Performed by: FAMILY MEDICINE

## 2024-04-11 ASSESSMENT — PATIENT HEALTH QUESTIONNAIRE - PHQ9
SUM OF ALL RESPONSES TO PHQ QUESTIONS 1-9: 0
2. FEELING DOWN, DEPRESSED OR HOPELESS: NOT AT ALL
SUM OF ALL RESPONSES TO PHQ QUESTIONS 1-9: 0
1. LITTLE INTEREST OR PLEASURE IN DOING THINGS: NOT AT ALL
SUM OF ALL RESPONSES TO PHQ QUESTIONS 1-9: 0
SUM OF ALL RESPONSES TO PHQ QUESTIONS 1-9: 0
SUM OF ALL RESPONSES TO PHQ9 QUESTIONS 1 & 2: 0

## 2024-04-11 NOTE — PROGRESS NOTES
[facility] Progress Note:   F/up Physician Visit    CC: Weight Management      Maria G Sanchez is a 68 y.o. female who is here for her  f/up physician visit for the / LCD Program.  She is transitioning from the care of our NP who has left  practice to my care  She is s/p GBP 2012 and after that she  Got down to 156    March 2024 she was  215  Now 218    Triage visit had gone to 219 lbs  She has had a cough and has been laying around eating \" junk\"            4/11/2024     8:00 AM 4/11/2024     7:49 AM 3/20/2024     8:38 AM 3/6/2024    10:04 AM 3/6/2024    10:00 AM 2/5/2024     8:33 AM 1/31/2024     8:40 AM   Weight Metrics   Weight  218 lb 6.4 oz 219 lb 215 lb 12.8 oz  219 lb 8 oz 221 lb 9.6 oz   Neck (Inches) 12.75 in    12.5 in     Waist Measure Inches 48.75 in    43.75 in     Body Fat % 48.6 %    47.7 %     BMI (Calculated)  41.4 kg/m2 41.5 kg/m2 40.9 kg/m2  0 kg/m2 42 kg/m2          No data to display                   Current Outpatient Medications   Medication Sig Dispense Refill    furosemide (LASIX) 20 MG tablet Take 1 tablet by mouth daily      montelukast (SINGULAIR) 10 MG tablet Take 1 tablet by mouth nightly      Cholecalciferol (VITAMIN D3) 50 MCG (2000 UT) TABS TAKE 1 TABLET BY MOUTH DAILY 90 tablet 3    albuterol sulfate HFA (PROVENTIL;VENTOLIN;PROAIR) 108 (90 Base) MCG/ACT inhaler Inhale 2 puffs into the lungs every 4 hours as needed      albuterol (PROVENTIL) (2.5 MG/3ML) 0.083% nebulizer solution Inhale 3 mLs into the lungs      amLODIPine-benazepril (LOTREL) 5-20 MG per capsule Take 1 capsule by mouth daily      atorvastatin (LIPITOR) 40 MG tablet Take 1 tablet by mouth nightly      aspirin 81 MG chewable tablet Take 1 tablet by mouth daily      tiotropium (SPIRIVA RESPIMAT) 1.25 MCG/ACT AERS inhaler Inhale 2 puffs into the lungs daily      mepolizumab (NUCALA) 100 MG/ML SOSY injection Inject 1 mL into the skin every 30 days      fluticasone furoate-vilanterol (BREO

## 2024-04-11 NOTE — PROGRESS NOTES
Progress Note: Weekly Education Class in the [facility]         Patient is on Very Low Calorie Diet [] (4 meal replacements per day, 800 kcal/day)      Low Calorie Diet [x] (2-3 meal replacements per day, 7968-2166 kcal/day)    1) Did patient have any new symptoms or physical problems?   Yes []    No [x]    If yes, check & comment: weakness [], fatigue [], lightheadedness [], headache [], cramps [], cold intolerance [], hair loss [], diarrhea [], constipation [],  NA [] other:                                 2) Has patient had any medical attention from other providers, urgent care or the emergency room this week?  Yes []  No [x]       NA [], If yes, why:                                       3) Any other sugar sweetened beverages consumed this week?   Yes [x]  No []    4) Did patient have any problems adhering to the diet? Yes [x]  No [] NA []    If yes, Vacation [], Celebrations [x], Conferences [], Family Reunions [] other:                                                5) How many hours of sleep this week? 4-7    (range)  NA []    Number of meal replacements consumed daily? 1-4 (range)  NA []    Average ounces of water patient consumed daily this week (not including shakes)? 23     (divide the weekly total by 7)    Did you eat any food outside of the program? Yes [x] No []    Physical Activity Over the Past Week:    Cardio exercise: 0 min  Strength exercise: 0 workouts / week  Number of steps walked per day: 3524-8095    How has patient mood overall been this week? Sad [], Happy [], Stressed [], Tired [], Content [x], NA [], other            Medications reconciled by nurse Yes [x]  No[]    Patient was given therapeutic recommendations for any noted side effects of their dietary approach based upon [facility] patient manual per providers recommendation.

## 2024-04-11 NOTE — PROGRESS NOTES
Identified pt with two pt identifiers (name and ). Reviewed chart in preparation for visit and have obtained necessary documentation.    Maria G Sanchez is a 68 y.o. female  Chief Complaint   Patient presents with    Weight Management     1 month follow up     /80 (Site: Left Upper Arm, Position: Sitting, Cuff Size: Large Adult) Comment: manual  Pulse 63   Temp 97.7 °F (36.5 °C) (Oral)   Resp 20   Ht 1.549 m (5' 1\")   Wt 99.1 kg (218 lb 6.4 oz)   SpO2 97%   BMI 41.27 kg/m²     1. Have you been to the ER, urgent care clinic since your last visit?  Hospitalized since your last visit?no    2. Have you seen or consulted any other health care providers outside of the [facility] since your last visit?  Include any pap smears or colon screening. Pulmonologist yesterday (Dr. Flores)    BMI - 41.4    Patient and provider made aware of elevated BP x2. Patient asymptomatic. Patient reminded to monitor BP, continue to take BP medications if prescribed, and follow up with PCP/Cardiologist.  Patient expressed understanding and agreement.

## 2024-04-12 LAB
ALBUMIN SERPL-MCNC: 4.1 G/DL (ref 3.9–4.9)
ALBUMIN/GLOB SERPL: 2 {RATIO} (ref 1.2–2.2)
ALP SERPL-CCNC: 98 IU/L (ref 44–121)
ALT SERPL-CCNC: 23 IU/L (ref 0–32)
AST SERPL-CCNC: 27 IU/L (ref 0–40)
BILIRUB SERPL-MCNC: <0.2 MG/DL (ref 0–1.2)
BUN SERPL-MCNC: 18 MG/DL (ref 8–27)
BUN/CREAT SERPL: 27 (ref 12–28)
CALCIUM SERPL-MCNC: 9.7 MG/DL (ref 8.7–10.3)
CHLORIDE SERPL-SCNC: 106 MMOL/L (ref 96–106)
CO2 SERPL-SCNC: 23 MMOL/L (ref 20–29)
CREAT SERPL-MCNC: 0.67 MG/DL (ref 0.57–1)
EGFRCR SERPLBLD CKD-EPI 2021: 94 ML/MIN/1.73
GLOBULIN SER CALC-MCNC: 2.1 G/DL (ref 1.5–4.5)
GLUCOSE SERPL-MCNC: 91 MG/DL (ref 70–99)
HBA1C MFR BLD: 5.9 % (ref 4.8–5.6)
POTASSIUM SERPL-SCNC: 4.6 MMOL/L (ref 3.5–5.2)
PROT SERPL-MCNC: 6.2 G/DL (ref 6–8.5)
SODIUM SERPL-SCNC: 143 MMOL/L (ref 134–144)

## 2024-04-18 ENCOUNTER — OFFICE VISIT (OUTPATIENT)
Age: 69
End: 2024-04-18

## 2024-04-18 DIAGNOSIS — E66.01 CLASS 3 SEVERE OBESITY DUE TO EXCESS CALORIES WITH SERIOUS COMORBIDITY AND BODY MASS INDEX (BMI) OF 40.0 TO 44.9 IN ADULT (HCC): Primary | ICD-10-CM

## 2024-04-18 NOTE — PROGRESS NOTES
Fe) MG tablet Take 1 tablet by mouth every morning (before breakfast)    ProviderJatin MD   calcium carbonate 600 MG TABS tablet Take 1 tablet by mouth nightly    ProviderJatin MD   Multiple Vitamins-Minerals (ONE-A-DAY WOMENS 50+ PO) Take 1 tablet by mouth daily    ProviderJatin MD              Starting Weight: 239#  Current Weight: 218# (215# last visit one month ago)  Overall Pounds Lost: 21# Overall Pounds Gained: 0    Exercise/Physical Activity:walking group every evening.    Is patient using [facility] products:yes  If yes, how many per day: 1-2    Aversions/side effects of product/program:none    Fluids used to mix with products:water    Reported Diet History:  May or may not have ND shakes.  Eating the \"wrong food\" out of boredom.  She says she knows what to do, just filling the time.   Worst time of day is early morning.  Coffee and breakfast. Once she starts eating, it triggers her to keep going.    For example, this morning, she is going out with residents on the bus and planning on having lunch out.  She made a ND shake with the plan to have grocery meal at lunch.  Then she made coffee, then got ham and now \"can't control herself.\"    Barriers/concerns preventing patient from achieving goal(s) since last visit:  Wants to give up. Bored. She is considering [employer].  Saw counseling, didn't help and isn't going back.    NUTRITION INTERVENTION:  Pt educated on nutrition recommendations for the [facility] Low Calorie Plan (LCD), with the specific meal pattern of 2 meal replacements every day plus a grocery meal and snack.  Daily recommended totals: 1200 calories, 60 grams carbs, 80+ grams protein, and remaining calories as healthy fats.  Use LCD handout for meal and snack suggestions and preparation.    Grocery meal:  Use the balanced plate method to plan meals, include 3-6 oz of lean source of protein, unlimited non-starchy vegetables, 1/2 cup whole grains/beans OR 1/2 cup

## 2024-04-24 ENCOUNTER — NURSE ONLY (OUTPATIENT)
Age: 69
End: 2024-04-24

## 2024-04-24 VITALS
BODY MASS INDEX: 41.37 KG/M2 | WEIGHT: 219.1 LBS | HEIGHT: 61 IN | DIASTOLIC BLOOD PRESSURE: 84 MMHG | TEMPERATURE: 98.1 F | HEART RATE: 64 BPM | SYSTOLIC BLOOD PRESSURE: 132 MMHG | OXYGEN SATURATION: 98 % | RESPIRATION RATE: 20 BRPM

## 2024-04-24 DIAGNOSIS — I10 PRIMARY HYPERTENSION: ICD-10-CM

## 2024-04-24 DIAGNOSIS — R73.9 BLOOD GLUCOSE ELEVATED: ICD-10-CM

## 2024-04-24 DIAGNOSIS — E66.01 CLASS 3 SEVERE OBESITY DUE TO EXCESS CALORIES WITH SERIOUS COMORBIDITY AND BODY MASS INDEX (BMI) OF 40.0 TO 44.9 IN ADULT (HCC): Primary | ICD-10-CM

## 2024-04-24 DIAGNOSIS — E78.5 HYPERLIPIDEMIA, UNSPECIFIED HYPERLIPIDEMIA TYPE: ICD-10-CM

## 2024-04-24 ASSESSMENT — PATIENT HEALTH QUESTIONNAIRE - PHQ9
SUM OF ALL RESPONSES TO PHQ QUESTIONS 1-9: 0
SUM OF ALL RESPONSES TO PHQ QUESTIONS 1-9: 0
SUM OF ALL RESPONSES TO PHQ9 QUESTIONS 1 & 2: 0
SUM OF ALL RESPONSES TO PHQ QUESTIONS 1-9: 0
2. FEELING DOWN, DEPRESSED OR HOPELESS: NOT AT ALL
1. LITTLE INTEREST OR PLEASURE IN DOING THINGS: NOT AT ALL
SUM OF ALL RESPONSES TO PHQ QUESTIONS 1-9: 0

## 2024-04-24 NOTE — PROGRESS NOTES
Progress Note: Weekly Education Class in the [facility]         Patient is on Very Low Calorie Diet [] (4 meal replacements per day, 800 kcal/day)      Low Calorie Diet [x] (2-3 meal replacements per day, 4698-4760 kcal/day)    1) Did patient have any new symptoms or physical problems?   Yes []    No [x]    If yes, check & comment: weakness [], fatigue [], lightheadedness [], headache [], cramps [], cold intolerance [], hair loss [], diarrhea [], constipation [],  NA [] other:                                 2) Has patient had any medical attention from other providers, urgent care or the emergency room this week?  Yes []  No [x]       NA [], If yes, why:                                       3) Any other sugar sweetened beverages consumed this week?   Yes [x]  No []    4) Did patient have any problems adhering to the diet? Yes [x]  No [] NA []    If yes, Vacation [], Celebrations [x], Conferences [], Family Reunions [] other:                                                5) How many hours of sleep this week? 5.5-8    (range)  NA []    Number of meal replacements consumed daily? 1-3 (range)  NA []    Average ounces of water patient consumed daily this week (not including shakes)? 26     (divide the weekly total by 7)    Did you eat any food outside of the program? Yes [x] No []    Physical Activity Over the Past Week:    Cardio exercise: 0 min  Strength exercise: 0 workouts / week  Number of steps walked per day: 1801-9161    How has patient mood overall been this week? Sad [], Happy [], Stressed [], Tired [x], Content [x], NA [], other            Medications reconciled by nurse Yes [x]  No[]    Patient was given therapeutic recommendations for any noted side effects of their dietary approach based upon [facility] patient manual per providers recommendation.

## 2024-04-24 NOTE — PROGRESS NOTES
Identified pt with two pt identifiers (name and ). Reviewed chart in preparation for visit and have obtained necessary documentation.    Maria G Sanchez is a 68 y.o. female  Chief Complaint   Patient presents with    Weight Management     /84 (Site: Left Upper Arm, Position: Sitting, Cuff Size: Large Adult) Comment: manual  Pulse 64   Temp 98.1 °F (36.7 °C) (Oral)   Resp 20   Ht 1.549 m (5' 1\")   Wt 99.4 kg (219 lb 1.6 oz)   SpO2 98%   BMI 41.40 kg/m²     1. Have you been to the ER, urgent care clinic since your last visit?  Hospitalized since your last visit?no    2. Have you seen or consulted any other health care providers outside of the [facility] since your last visit?  Include any pap smears or colon screening. No    Patient and provider made aware of elevated BP x2. Patient asymptomatic. Patient reminded to monitor BP, continue to take BP medications if prescribed, and follow up with PCP/Cardiologist.  Patient expressed understanding and agreement.

## 2024-05-01 NOTE — PROGRESS NOTES
Nurse note from patient's weekly VLCD / LCD / Maintenance class was reviewed.  Pertinent medical concerns were:   reviewed     BP Readings from Last 3 Encounters:   04/24/24 132/84   04/11/24 132/80   03/20/24 124/85       Failed to redirect to the Timeline version of the [facility] SmartLink.    Current Outpatient Medications   Medication Sig Dispense Refill    furosemide (LASIX) 20 MG tablet Take 1 tablet by mouth daily      montelukast (SINGULAIR) 10 MG tablet Take 1 tablet by mouth nightly      Cholecalciferol (VITAMIN D3) 50 MCG (2000 UT) TABS TAKE 1 TABLET BY MOUTH DAILY 90 tablet 3    albuterol sulfate HFA (PROVENTIL;VENTOLIN;PROAIR) 108 (90 Base) MCG/ACT inhaler Inhale 2 puffs into the lungs every 4 hours as needed      albuterol (PROVENTIL) (2.5 MG/3ML) 0.083% nebulizer solution Inhale 3 mLs into the lungs      amLODIPine-benazepril (LOTREL) 5-20 MG per capsule Take 1 capsule by mouth daily      atorvastatin (LIPITOR) 40 MG tablet Take 1 tablet by mouth nightly      aspirin 81 MG chewable tablet Take 1 tablet by mouth daily      tiotropium (SPIRIVA RESPIMAT) 1.25 MCG/ACT AERS inhaler Inhale 2 puffs into the lungs daily      mepolizumab (NUCALA) 100 MG/ML SOSY injection Inject 1 mL into the skin every 30 days      fluticasone furoate-vilanterol (BREO ELLIPTA) 200-25 MCG/ACT AEPB inhaler Inhale 1 puff into the lungs as needed      azelastine (ASTELIN) 0.1 % nasal spray 2 sprays by Nasal route 2 times daily      fluticasone (FLONASE) 50 MCG/ACT nasal spray 2 sprays by Nasal route daily as needed      ferrous sulfate (IRON 325) 325 (65 Fe) MG tablet Take 1 tablet by mouth every morning (before breakfast)      calcium carbonate 600 MG TABS tablet Take 1 tablet by mouth nightly      Multiple Vitamins-Minerals (ONE-A-DAY WOMENS 50+ PO) Take 1 tablet by mouth daily       No current facility-administered medications for this visit.

## 2024-05-06 ENCOUNTER — NURSE ONLY (OUTPATIENT)
Age: 69
End: 2024-05-06

## 2024-05-06 VITALS
DIASTOLIC BLOOD PRESSURE: 86 MMHG | HEART RATE: 66 BPM | BODY MASS INDEX: 41.22 KG/M2 | TEMPERATURE: 97.8 F | WEIGHT: 218.3 LBS | OXYGEN SATURATION: 97 % | SYSTOLIC BLOOD PRESSURE: 136 MMHG | RESPIRATION RATE: 20 BRPM | HEIGHT: 61 IN

## 2024-05-06 DIAGNOSIS — I10 PRIMARY HYPERTENSION: ICD-10-CM

## 2024-05-06 DIAGNOSIS — R73.9 BLOOD GLUCOSE ELEVATED: ICD-10-CM

## 2024-05-06 DIAGNOSIS — E78.5 HYPERLIPIDEMIA, UNSPECIFIED HYPERLIPIDEMIA TYPE: ICD-10-CM

## 2024-05-06 DIAGNOSIS — E66.01 CLASS 3 SEVERE OBESITY DUE TO EXCESS CALORIES WITH SERIOUS COMORBIDITY AND BODY MASS INDEX (BMI) OF 40.0 TO 44.9 IN ADULT (HCC): Primary | ICD-10-CM

## 2024-05-06 ASSESSMENT — PATIENT HEALTH QUESTIONNAIRE - PHQ9
1. LITTLE INTEREST OR PLEASURE IN DOING THINGS: NOT AT ALL
SUM OF ALL RESPONSES TO PHQ9 QUESTIONS 1 & 2: 0
SUM OF ALL RESPONSES TO PHQ QUESTIONS 1-9: 0
SUM OF ALL RESPONSES TO PHQ QUESTIONS 1-9: 0
2. FEELING DOWN, DEPRESSED OR HOPELESS: NOT AT ALL
SUM OF ALL RESPONSES TO PHQ QUESTIONS 1-9: 0
SUM OF ALL RESPONSES TO PHQ QUESTIONS 1-9: 0

## 2024-05-06 NOTE — PROGRESS NOTES
Identified pt with two pt identifiers (name and ). Reviewed chart in preparation for visit and have obtained necessary documentation.    Maria G Sanchez is a 68 y.o. female  Chief Complaint   Patient presents with    Weight Management     /86 (Site: Left Upper Arm, Position: Sitting, Cuff Size: Large Adult) Comment: manual  Pulse 66   Temp 97.8 °F (36.6 °C) (Oral)   Resp 20   Ht 1.549 m (5' 1\")   SpO2 97%   BMI 41.40 kg/m²     1. Have you been to the ER, urgent care clinic since your last visit?  Hospitalized since your last visit?no    2. Have you seen or consulted any other health care providers outside of the [facility] since your last visit?  Include any pap smears or colon screening. No    Patient and provider made aware of elevated BP x2. Patient asymptomatic. Patient reminded to monitor BP, continue to take BP medications if prescribed, and follow up with PCP/Cardiologist.  Patient expressed understanding and agreement.

## 2024-05-06 NOTE — PROGRESS NOTES
Progress Note: Weekly Education Class in the [facility]         Patient is on Very Low Calorie Diet [] (4 meal replacements per day, 800 kcal/day)      Low Calorie Diet [x] (2-3 meal replacements per day, 1204-0982 kcal/day)    1) Did patient have any new symptoms or physical problems?   Yes []    No [x]    If yes, check & comment: weakness [], fatigue [], lightheadedness [], headache [], cramps [], cold intolerance [], hair loss [], diarrhea [], constipation [],  NA [] other:                                 2) Has patient had any medical attention from other providers, urgent care or the emergency room this week?  Yes []  No [x]       NA [], If yes, why:                                       3) Any other sugar sweetened beverages consumed this week?   Yes [x]  No []    4) Did patient have any problems adhering to the diet? Yes [x]  No [] NA []    If yes, Vacation [], Celebrations [], Conferences [], Family Reunions [x] other:                                                5) How many hours of sleep this week? 5.5-7    (range)  NA []    Number of meal replacements consumed daily? 1-3 (range)  NA []    Average ounces of water patient consumed daily this week (not including shakes)? 46     (divide the weekly total by 7)    Did you eat any food outside of the program? Yes [x] No []    Physical Activity Over the Past Week:    Cardio exercise: 0 min  Strength exercise: 0 workouts / week  Number of steps walked per day: 9663-0864    How has patient mood overall been this week? Sad [], Happy [], Stressed [], Tired [x], Content [], NA [], other            Medications reconciled by nurse Yes [x]  No[]    Patient was given therapeutic recommendations for any noted side effects of their dietary approach based upon [facility] patient manual per providers recommendation.

## 2024-05-16 ENCOUNTER — OFFICE VISIT (OUTPATIENT)
Age: 69
End: 2024-05-16
Payer: MEDICARE

## 2024-05-16 VITALS
OXYGEN SATURATION: 96 % | HEART RATE: 61 BPM | WEIGHT: 216.7 LBS | DIASTOLIC BLOOD PRESSURE: 90 MMHG | HEIGHT: 61 IN | RESPIRATION RATE: 20 BRPM | BODY MASS INDEX: 40.91 KG/M2 | TEMPERATURE: 97.9 F | SYSTOLIC BLOOD PRESSURE: 138 MMHG

## 2024-05-16 DIAGNOSIS — I10 PRIMARY HYPERTENSION: ICD-10-CM

## 2024-05-16 DIAGNOSIS — E78.5 HYPERLIPIDEMIA, UNSPECIFIED HYPERLIPIDEMIA TYPE: ICD-10-CM

## 2024-05-16 DIAGNOSIS — E66.01 CLASS 3 SEVERE OBESITY DUE TO EXCESS CALORIES WITH SERIOUS COMORBIDITY AND BODY MASS INDEX (BMI) OF 40.0 TO 44.9 IN ADULT (HCC): Primary | ICD-10-CM

## 2024-05-16 DIAGNOSIS — R73.9 BLOOD GLUCOSE ELEVATED: ICD-10-CM

## 2024-05-16 PROCEDURE — 3075F SYST BP GE 130 - 139MM HG: CPT | Performed by: FAMILY MEDICINE

## 2024-05-16 PROCEDURE — 99214 OFFICE O/P EST MOD 30 MIN: CPT | Performed by: FAMILY MEDICINE

## 2024-05-16 PROCEDURE — 1090F PRES/ABSN URINE INCON ASSESS: CPT | Performed by: FAMILY MEDICINE

## 2024-05-16 PROCEDURE — 3017F COLORECTAL CA SCREEN DOC REV: CPT | Performed by: FAMILY MEDICINE

## 2024-05-16 PROCEDURE — 1036F TOBACCO NON-USER: CPT | Performed by: FAMILY MEDICINE

## 2024-05-16 PROCEDURE — 3079F DIAST BP 80-89 MM HG: CPT | Performed by: FAMILY MEDICINE

## 2024-05-16 PROCEDURE — G8427 DOCREV CUR MEDS BY ELIG CLIN: HCPCS | Performed by: FAMILY MEDICINE

## 2024-05-16 PROCEDURE — G8399 PT W/DXA RESULTS DOCUMENT: HCPCS | Performed by: FAMILY MEDICINE

## 2024-05-16 PROCEDURE — G8417 CALC BMI ABV UP PARAM F/U: HCPCS | Performed by: FAMILY MEDICINE

## 2024-05-16 PROCEDURE — 1123F ACP DISCUSS/DSCN MKR DOCD: CPT | Performed by: FAMILY MEDICINE

## 2024-05-16 ASSESSMENT — PATIENT HEALTH QUESTIONNAIRE - PHQ9
2. FEELING DOWN, DEPRESSED OR HOPELESS: NOT AT ALL
SUM OF ALL RESPONSES TO PHQ QUESTIONS 1-9: 0
SUM OF ALL RESPONSES TO PHQ9 QUESTIONS 1 & 2: 0
1. LITTLE INTEREST OR PLEASURE IN DOING THINGS: NOT AT ALL

## 2024-05-16 NOTE — PROGRESS NOTES
Identified pt with two pt identifiers (name and ). Reviewed chart in preparation for visit and have obtained necessary documentation.    Maria G Sanchez is a 68 y.o. female  Chief Complaint   Patient presents with    Weight Management     1 month follow up     BP (!) 138/90 (Site: Left Upper Arm, Position: Sitting, Cuff Size: Large Adult) Comment: manual  Pulse 61   Temp 97.9 °F (36.6 °C) (Oral)   Resp 20   Ht 1.549 m (5' 1\")   Wt 98.3 kg (216 lb 11.2 oz)   SpO2 96%   BMI 40.95 kg/m²     1. Have you been to the ER, urgent care clinic since your last visit?  Hospitalized since your last visit?no    2. Have you seen or consulted any other health care providers outside of the [facility] since your last visit?  Include any pap smears or colon screening. No    BMI - 41.0    Patient and provider made aware of elevated BP x2. Patient asymptomatic. Patient reminded to monitor BP, continue to take BP medications if prescribed, and follow up with PCP/Cardiologist.  Patient expressed understanding and agreement.

## 2024-05-16 NOTE — PROGRESS NOTES
Progress Note: Weekly Education Class in the [facility]         Patient is on Very Low Calorie Diet [] (4 meal replacements per day, 800 kcal/day)      Low Calorie Diet [x] (2-3 meal replacements per day, 0866-8851 kcal/day)    1) Did patient have any new symptoms or physical problems?   Yes []    No [x]    If yes, check & comment: weakness [], fatigue [], lightheadedness [], headache [], cramps [], cold intolerance [], hair loss [], diarrhea [], constipation [],  NA [] other:                                 2) Has patient had any medical attention from other providers, urgent care or the emergency room this week?  Yes []  No [x]       NA [], If yes, why:                                       3) Any other sugar sweetened beverages consumed this week?   Yes [x]  No []    4) Did patient have any problems adhering to the diet? Yes [x]  No [] NA []    If yes, Vacation [], Celebrations [x], Conferences [], Family Reunions [x] other:                                                5) How many hours of sleep this week? 5-7    (range)  NA []    Number of meal replacements consumed daily? 1-3 (range)  NA []    Average ounces of water patient consumed daily this week (not including shakes)? 46     (divide the weekly total by 7)    Did you eat any food outside of the program? Yes [] No [x]    Physical Activity Over the Past Week:    Cardio exercise: 0 min  Strength exercise: 0 workouts / week  Number of steps walked per day: 6587-7981    How has patient mood overall been this week? Sad [], Happy [], Stressed [], Tired [], Content [], NA [], other NOT ANSWERED             Medications reconciled by nurse Yes [x]  No[]    Patient was given therapeutic recommendations for any noted side effects of their dietary approach based upon [facility] patient manual per providers recommendation.

## 2024-05-16 NOTE — PROGRESS NOTES
[facility] Progress Note:   F/up Physician Visit    CC: Weight Management      Maria G Sanchez is a 68 y.o. female who is here for her  f/up physician visit for the / LCD Program.      She is s/p GBP 2012 and after that she  Got down to 156     April 218  Now 216    She started coughing in exam room and reported she has h/o asthma  Her O2sat 100% on room air when I rechecked it  She inhaled twice using the albuterol from her purse and seemed to improve a few seconds later      Current Outpatient Medications   Medication Sig Dispense Refill    furosemide (LASIX) 20 MG tablet Take 1 tablet by mouth daily      montelukast (SINGULAIR) 10 MG tablet Take 1 tablet by mouth nightly      Cholecalciferol (VITAMIN D3) 50 MCG (2000 UT) TABS TAKE 1 TABLET BY MOUTH DAILY 90 tablet 3    albuterol sulfate HFA (PROVENTIL;VENTOLIN;PROAIR) 108 (90 Base) MCG/ACT inhaler Inhale 2 puffs into the lungs every 4 hours as needed      albuterol (PROVENTIL) (2.5 MG/3ML) 0.083% nebulizer solution Inhale 3 mLs into the lungs      amLODIPine-benazepril (LOTREL) 5-20 MG per capsule Take 1 capsule by mouth daily      atorvastatin (LIPITOR) 40 MG tablet Take 1 tablet by mouth nightly      aspirin 81 MG chewable tablet Take 1 tablet by mouth daily      tiotropium (SPIRIVA RESPIMAT) 1.25 MCG/ACT AERS inhaler Inhale 2 puffs into the lungs daily      mepolizumab (NUCALA) 100 MG/ML SOSY injection Inject 1 mL into the skin every 30 days      fluticasone furoate-vilanterol (BREO ELLIPTA) 200-25 MCG/ACT AEPB inhaler Inhale 1 puff into the lungs as needed      azelastine (ASTELIN) 0.1 % nasal spray 2 sprays by Nasal route 2 times daily      fluticasone (FLONASE) 50 MCG/ACT nasal spray 2 sprays by Nasal route daily as needed      ferrous sulfate (IRON 325) 325 (65 Fe) MG tablet Take 1 tablet by mouth every morning (before breakfast)      calcium carbonate 600 MG TABS tablet Take 1 tablet by mouth nightly      Multiple Vitamins-Minerals

## 2024-05-29 ENCOUNTER — NURSE ONLY (OUTPATIENT)
Age: 69
End: 2024-05-29

## 2024-05-29 VITALS
SYSTOLIC BLOOD PRESSURE: 136 MMHG | WEIGHT: 218 LBS | HEIGHT: 61 IN | TEMPERATURE: 97.9 F | RESPIRATION RATE: 20 BRPM | HEART RATE: 68 BPM | OXYGEN SATURATION: 95 % | DIASTOLIC BLOOD PRESSURE: 87 MMHG | BODY MASS INDEX: 41.16 KG/M2

## 2024-05-29 DIAGNOSIS — E66.01 CLASS 3 SEVERE OBESITY DUE TO EXCESS CALORIES WITH SERIOUS COMORBIDITY AND BODY MASS INDEX (BMI) OF 40.0 TO 44.9 IN ADULT (HCC): Primary | ICD-10-CM

## 2024-05-29 DIAGNOSIS — I10 PRIMARY HYPERTENSION: ICD-10-CM

## 2024-05-29 DIAGNOSIS — R73.9 BLOOD GLUCOSE ELEVATED: ICD-10-CM

## 2024-05-29 DIAGNOSIS — E78.5 HYPERLIPIDEMIA, UNSPECIFIED HYPERLIPIDEMIA TYPE: ICD-10-CM

## 2024-05-29 ASSESSMENT — PATIENT HEALTH QUESTIONNAIRE - PHQ9
SUM OF ALL RESPONSES TO PHQ QUESTIONS 1-9: 0
SUM OF ALL RESPONSES TO PHQ QUESTIONS 1-9: 0
2. FEELING DOWN, DEPRESSED OR HOPELESS: NOT AT ALL
SUM OF ALL RESPONSES TO PHQ9 QUESTIONS 1 & 2: 0
SUM OF ALL RESPONSES TO PHQ QUESTIONS 1-9: 0
1. LITTLE INTEREST OR PLEASURE IN DOING THINGS: NOT AT ALL
SUM OF ALL RESPONSES TO PHQ QUESTIONS 1-9: 0

## 2024-05-29 NOTE — PROGRESS NOTES
Progress Note: Weekly Education Class in the [facility] Loss [facility]         Patient is on Very Low Calorie Diet [] (4 meal replacements per day, 800 kcal/day)      Low Calorie Diet [x] (2-3 meal replacements per day, 4580-9626 kcal/day)    1) Did patient have any new symptoms or physical problems?   Yes []    No [x]    If yes, check & comment: weakness [], fatigue [], lightheadedness [], headache [], cramps [], cold intolerance [], hair loss [], diarrhea [], constipation [],  NA [] other:                                 2) Has patient had any medical attention from other providers, urgent care or the emergency room this week?  Yes []  No [x]       NA [], If yes, why:                                       3) Any other sugar sweetened beverages consumed this week?   Yes []  No [x]    4) Did patient have any problems adhering to the diet? Yes []  No [x] NA []    If yes, Vacation [], Celebrations [], Conferences [], Family Reunions [] other:                                                5) How many hours of sleep this week? 5-7.5    (range)  NA []    Number of meal replacements consumed daily? 1-2 (range)  NA []    Average ounces of water patient consumed daily this week (not including shakes)? 23     (divide the weekly total by 7)    Did you eat any food outside of the program? Yes [] No [x]    Physical Activity Over the Past Week:    Cardio exercise: 0 min  Strength exercise: 0 workouts / week  Number of steps walked per day: 7820-9492    How has patient mood overall been this week? Sad [], Happy [], Stressed [], Tired [], Content [], NA [], other NOT ANSWERED             Medications reconciled by nurse Yes [x]  No[]    Patient was given therapeutic recommendations for any noted side effects of their dietary approach based upon [facility] patient manual per providers recommendation.

## 2024-05-29 NOTE — PROGRESS NOTES
Identified pt with two pt identifiers (name and ). Reviewed chart in preparation for visit and have obtained necessary documentation.    Maria G Sanchez is a 68 y.o. female  Chief Complaint   Patient presents with    Weight Management     /87 (Site: Right Upper Arm, Position: Sitting, Cuff Size: Large Adult)   Pulse 68   Temp 97.9 °F (36.6 °C) (Oral)   Resp 20   Ht 1.549 m (5' 1\")   Wt 98.9 kg (218 lb)   SpO2 95%   BMI 41.19 kg/m²     1. Have you been to the ER, urgent care clinic since your last visit?  Hospitalized since your last visit?no    2. Have you seen or consulted any other health care providers outside of the [facility] since your last visit?  Include any pap smears or colon screening. no

## 2024-06-03 ENCOUNTER — OFFICE VISIT (OUTPATIENT)
Facility: CLINIC | Age: 69
End: 2024-06-03
Payer: MEDICARE

## 2024-06-03 VITALS
RESPIRATION RATE: 19 BRPM | BODY MASS INDEX: 41.37 KG/M2 | HEART RATE: 65 BPM | WEIGHT: 219.1 LBS | DIASTOLIC BLOOD PRESSURE: 88 MMHG | SYSTOLIC BLOOD PRESSURE: 139 MMHG | TEMPERATURE: 98.2 F | HEIGHT: 61 IN | OXYGEN SATURATION: 100 %

## 2024-06-03 DIAGNOSIS — E66.01 MORBID OBESITY (HCC): ICD-10-CM

## 2024-06-03 DIAGNOSIS — Z98.84 BARIATRIC SURGERY STATUS: ICD-10-CM

## 2024-06-03 DIAGNOSIS — I10 PRIMARY HYPERTENSION: Primary | ICD-10-CM

## 2024-06-03 DIAGNOSIS — J45.50 SEVERE PERSISTENT ASTHMA WITHOUT COMPLICATION: ICD-10-CM

## 2024-06-03 PROCEDURE — 3075F SYST BP GE 130 - 139MM HG: CPT | Performed by: INTERNAL MEDICINE

## 2024-06-03 PROCEDURE — 3017F COLORECTAL CA SCREEN DOC REV: CPT | Performed by: INTERNAL MEDICINE

## 2024-06-03 PROCEDURE — G8427 DOCREV CUR MEDS BY ELIG CLIN: HCPCS | Performed by: INTERNAL MEDICINE

## 2024-06-03 PROCEDURE — 1036F TOBACCO NON-USER: CPT | Performed by: INTERNAL MEDICINE

## 2024-06-03 PROCEDURE — 1123F ACP DISCUSS/DSCN MKR DOCD: CPT | Performed by: INTERNAL MEDICINE

## 2024-06-03 PROCEDURE — 99214 OFFICE O/P EST MOD 30 MIN: CPT | Performed by: INTERNAL MEDICINE

## 2024-06-03 PROCEDURE — 3079F DIAST BP 80-89 MM HG: CPT | Performed by: INTERNAL MEDICINE

## 2024-06-03 PROCEDURE — G8417 CALC BMI ABV UP PARAM F/U: HCPCS | Performed by: INTERNAL MEDICINE

## 2024-06-03 PROCEDURE — 1090F PRES/ABSN URINE INCON ASSESS: CPT | Performed by: INTERNAL MEDICINE

## 2024-06-03 PROCEDURE — G8399 PT W/DXA RESULTS DOCUMENT: HCPCS | Performed by: INTERNAL MEDICINE

## 2024-06-03 ASSESSMENT — ANXIETY QUESTIONNAIRES
4. TROUBLE RELAXING: NOT AT ALL
7. FEELING AFRAID AS IF SOMETHING AWFUL MIGHT HAPPEN: NOT AT ALL
3. WORRYING TOO MUCH ABOUT DIFFERENT THINGS: NOT AT ALL
1. FEELING NERVOUS, ANXIOUS, OR ON EDGE: NOT AT ALL
GAD7 TOTAL SCORE: 0
IF YOU CHECKED OFF ANY PROBLEMS ON THIS QUESTIONNAIRE, HOW DIFFICULT HAVE THESE PROBLEMS MADE IT FOR YOU TO DO YOUR WORK, TAKE CARE OF THINGS AT HOME, OR GET ALONG WITH OTHER PEOPLE: NOT DIFFICULT AT ALL
6. BECOMING EASILY ANNOYED OR IRRITABLE: NOT AT ALL
2. NOT BEING ABLE TO STOP OR CONTROL WORRYING: NOT AT ALL
5. BEING SO RESTLESS THAT IT IS HARD TO SIT STILL: NOT AT ALL

## 2024-06-03 ASSESSMENT — PATIENT HEALTH QUESTIONNAIRE - PHQ9
SUM OF ALL RESPONSES TO PHQ QUESTIONS 1-9: 0
SUM OF ALL RESPONSES TO PHQ QUESTIONS 1-9: 0
SUM OF ALL RESPONSES TO PHQ9 QUESTIONS 1 & 2: 0
1. LITTLE INTEREST OR PLEASURE IN DOING THINGS: NOT AT ALL
SUM OF ALL RESPONSES TO PHQ QUESTIONS 1-9: 0
SUM OF ALL RESPONSES TO PHQ QUESTIONS 1-9: 0
2. FEELING DOWN, DEPRESSED OR HOPELESS: NOT AT ALL

## 2024-06-03 NOTE — PROGRESS NOTES
Chief Complaint   Patient presents with    Hypertension     \"Have you been to the ER, urgent care clinic since your last visit?  Hospitalized since your last visit?\"    NO    “Have you seen or consulted any other health care providers outside of [facility] since your last visit?”    NO            Click Here for Release of Records Request  HIPPA confirmed by two patient identifiers.

## 2024-06-03 NOTE — PROGRESS NOTES
Maria G Sanchez is a 67 y.o. female and presents with   Chief Complaint   Patient presents with    Hypertension        .  Subjective:    (mother is Shawna Haines)  Moved from [place] 2/5/2022        Pts meera le weakness since TIA x 2 has improved post PT.     Chronic le pain. Told she has lumbar DJD (vs DDD).     Pt is enrolled in [facility] medical weight loss program.    Pt requests neuro referral due to unsteady gait. She was told by her cardiologist it is not cardiac in origin.      PMH- HTN  BP Readings from Last 3 Encounters:   06/03/24 139/88   05/29/24 136/87   05/16/24 (!) 138/90         Asthma- Dr. Sánchez Randolph[facility]     HLD-on atorvastatin 40mg  Lab Results   Component Value Date    CHOL 207 (H) 11/15/2023    TRIG 47 11/15/2023    HDL 97 11/15/2023    VLDL 9 11/15/2023    CHOLHDLRATIO 1.8 02/14/2023       PFO-no surgical closure indicated as per cards      Vit B12 deficiency  Lab Results   Component Value Date    QVXIRGVD29 1353 (H) 08/16/2023       OA     Osteoporosis      H/o TIA     H/o kidney stones     PSH- meera TKR   Gastric bypass  Wt Readings from Last 3 Encounters:   06/03/24 99.4 kg (219 lb 1.6 oz)   05/29/24 98.9 kg (218 lb)   05/16/24 98.3 kg (216 lb 11.2 oz)        SH-   Lives alone   2 daughters and 4 grandchildren    FH- 6 siblings    HM  mammo- UTD  Colonoscopy- 1/22/2019  Immunizations  Eye care ~ 2 yrs ago  Dental care  BMD UTD      Review of Systems  Constitutional: negative for fevers, chills, anorexia and weight loss  Eyes:   negative for visual disturbance and irritation  ENT:   negative for tinnitus,sore throat,nasal congestion,ear pains.hoarseness  Respiratory:  negative for cough, hemoptysis, dyspnea,wheezing  CV:   negative for chest pain, palpitations, lower extremity edema  GI:   negative for nausea, vomiting, diarrhea, abdominal pain,melena  Musculoskel: positive for myalgias, arthralgias, back pain, muscle weakness, joint pain  Neurological:  positive

## 2024-06-04 NOTE — PROGRESS NOTES
Nurse note from patient's weekly  LCD / Maintenance class was reviewed.  Pertinent medical concerns were:   reviewed     BP Readings from Last 3 Encounters:   06/03/24 139/88   05/29/24 136/87   05/16/24 (!) 138/90       Failed to redirect to the Timeline version of the [facility] SmartLink.    Current Outpatient Medications   Medication Sig Dispense Refill    furosemide (LASIX) 20 MG tablet Take 1 tablet by mouth daily      montelukast (SINGULAIR) 10 MG tablet Take 1 tablet by mouth nightly      Cholecalciferol (VITAMIN D3) 50 MCG (2000 UT) TABS TAKE 1 TABLET BY MOUTH DAILY 90 tablet 3    albuterol sulfate HFA (PROVENTIL;VENTOLIN;PROAIR) 108 (90 Base) MCG/ACT inhaler Inhale 2 puffs into the lungs every 4 hours as needed      albuterol (PROVENTIL) (2.5 MG/3ML) 0.083% nebulizer solution Inhale 3 mLs into the lungs      amLODIPine-benazepril (LOTREL) 5-20 MG per capsule Take 1 capsule by mouth daily      atorvastatin (LIPITOR) 40 MG tablet Take 1 tablet by mouth nightly      aspirin 81 MG chewable tablet Take 1 tablet by mouth daily      tiotropium (SPIRIVA RESPIMAT) 1.25 MCG/ACT AERS inhaler Inhale 2 puffs into the lungs daily      mepolizumab (NUCALA) 100 MG/ML SOSY injection Inject 1 mL into the skin every 30 days      fluticasone furoate-vilanterol (BREO ELLIPTA) 200-25 MCG/ACT AEPB inhaler Inhale 1 puff into the lungs as needed      azelastine (ASTELIN) 0.1 % nasal spray 2 sprays by Nasal route 2 times daily      fluticasone (FLONASE) 50 MCG/ACT nasal spray 2 sprays by Nasal route daily as needed      ferrous sulfate (IRON 325) 325 (65 Fe) MG tablet Take 1 tablet by mouth every morning (before breakfast)      calcium carbonate 600 MG TABS tablet Take 1 tablet by mouth nightly      Multiple Vitamins-Minerals (ONE-A-DAY WOMENS 50+ PO) Take 1 tablet by mouth daily       No current facility-administered medications for this visit.

## 2024-06-05 ENCOUNTER — OFFICE VISIT (OUTPATIENT)
Age: 69
End: 2024-06-05

## 2024-06-05 DIAGNOSIS — E66.01 CLASS 3 SEVERE OBESITY DUE TO EXCESS CALORIES WITH SERIOUS COMORBIDITY AND BODY MASS INDEX (BMI) OF 40.0 TO 44.9 IN ADULT (HCC): Primary | ICD-10-CM

## 2024-06-05 NOTE — PROGRESS NOTES
1/2 cup fruit OR 1 serving of low fat dairy. Utilize handouts listing healthy snack and meal ideas.     Read all nutrition labels. Demonstrated and emphasized identifying serving size, total fat, sugar and protein content. Defined low fat as </= 3 g per serving. Discussed lean and extra lean sources of protein. Avoid foods with sugar listed in the first 3 ingredients and >/10 g sugar per serving.     Consume meal replacements every three to four hours or pattern as discussed with provider.  Mix with water.  May add herbs/spices for taste.    Practice mindful eating habits; take small bites, chew thoroughly, avoid distractions, utilize hunger/fullness scale.     Reviewed attendance policy of attending weekly nutrition classes.  Metabolic support group recommended, and increase physical activity (approved per MD) for long term weight maintenance.      NUTRITION MONITORING AND EVALUATION: maintaining last several months due to barriers.  Overall 20# loss x 16 months. Reviewed LCD goals. F/u 2 m.    The following goals were established with patient;  1) join the gym with daughter, and go with her 3-4 days a week  2) continue Clean Eatz 2 x a day.  Add 1 ND packet daily to prevent grazing on refined carbs.  3) increase water back to goal 64ounces      Specific tips and techniques to facilitate compliance with above recommendations were provided and discussed.   If further details are desired please contact me at [phone].  This phone number was also provided to the patient for any further questions or concerns.          Shannan Conner, MS, RD, LDN

## 2024-06-25 ENCOUNTER — OFFICE VISIT (OUTPATIENT)
Age: 69
End: 2024-06-25
Payer: MEDICARE

## 2024-06-25 VITALS
RESPIRATION RATE: 20 BRPM | SYSTOLIC BLOOD PRESSURE: 138 MMHG | OXYGEN SATURATION: 95 % | DIASTOLIC BLOOD PRESSURE: 88 MMHG | BODY MASS INDEX: 41.33 KG/M2 | HEART RATE: 64 BPM | TEMPERATURE: 97.7 F | HEIGHT: 61 IN | WEIGHT: 218.9 LBS

## 2024-06-25 DIAGNOSIS — I10 PRIMARY HYPERTENSION: ICD-10-CM

## 2024-06-25 DIAGNOSIS — E66.01 CLASS 3 SEVERE OBESITY DUE TO EXCESS CALORIES WITH SERIOUS COMORBIDITY AND BODY MASS INDEX (BMI) OF 40.0 TO 44.9 IN ADULT (HCC): ICD-10-CM

## 2024-06-25 DIAGNOSIS — E78.5 HYPERLIPIDEMIA, UNSPECIFIED HYPERLIPIDEMIA TYPE: ICD-10-CM

## 2024-06-25 DIAGNOSIS — R73.9 BLOOD GLUCOSE ELEVATED: ICD-10-CM

## 2024-06-25 DIAGNOSIS — E66.01 CLASS 3 SEVERE OBESITY DUE TO EXCESS CALORIES WITH SERIOUS COMORBIDITY AND BODY MASS INDEX (BMI) OF 40.0 TO 44.9 IN ADULT (HCC): Primary | ICD-10-CM

## 2024-06-25 PROCEDURE — 99214 OFFICE O/P EST MOD 30 MIN: CPT | Performed by: FAMILY MEDICINE

## 2024-06-25 PROCEDURE — 3075F SYST BP GE 130 - 139MM HG: CPT | Performed by: FAMILY MEDICINE

## 2024-06-25 PROCEDURE — 1036F TOBACCO NON-USER: CPT | Performed by: FAMILY MEDICINE

## 2024-06-25 PROCEDURE — 3079F DIAST BP 80-89 MM HG: CPT | Performed by: FAMILY MEDICINE

## 2024-06-25 PROCEDURE — 1123F ACP DISCUSS/DSCN MKR DOCD: CPT | Performed by: FAMILY MEDICINE

## 2024-06-25 PROCEDURE — 1090F PRES/ABSN URINE INCON ASSESS: CPT | Performed by: FAMILY MEDICINE

## 2024-06-25 PROCEDURE — G8417 CALC BMI ABV UP PARAM F/U: HCPCS | Performed by: FAMILY MEDICINE

## 2024-06-25 PROCEDURE — G8399 PT W/DXA RESULTS DOCUMENT: HCPCS | Performed by: FAMILY MEDICINE

## 2024-06-25 PROCEDURE — G8427 DOCREV CUR MEDS BY ELIG CLIN: HCPCS | Performed by: FAMILY MEDICINE

## 2024-06-25 PROCEDURE — 3017F COLORECTAL CA SCREEN DOC REV: CPT | Performed by: FAMILY MEDICINE

## 2024-06-25 RX ORDER — BUPROPION HYDROCHLORIDE 100 MG/1
100 TABLET, EXTENDED RELEASE ORAL DAILY
Qty: 90 TABLET | Refills: 1 | Status: SHIPPED | OUTPATIENT
Start: 2024-06-25

## 2024-06-25 ASSESSMENT — PATIENT HEALTH QUESTIONNAIRE - PHQ9
SUM OF ALL RESPONSES TO PHQ QUESTIONS 1-9: 0
SUM OF ALL RESPONSES TO PHQ9 QUESTIONS 1 & 2: 0
SUM OF ALL RESPONSES TO PHQ QUESTIONS 1-9: 0
1. LITTLE INTEREST OR PLEASURE IN DOING THINGS: NOT AT ALL
SUM OF ALL RESPONSES TO PHQ QUESTIONS 1-9: 0
2. FEELING DOWN, DEPRESSED OR HOPELESS: NOT AT ALL
SUM OF ALL RESPONSES TO PHQ QUESTIONS 1-9: 0

## 2024-06-25 NOTE — PROGRESS NOTES
Identified pt with two pt identifiers (name and ). Reviewed chart in preparation for visit and have obtained necessary documentation.    Maria G Sanchez is a 68 y.o. female  Chief Complaint   Patient presents with    Weight Management     1 month follow up     /88 (Site: Left Upper Arm, Position: Sitting, Cuff Size: Large Adult) Comment: manual  Pulse 64   Temp 97.7 °F (36.5 °C) (Oral)   Resp 20   Ht 1.549 m (5' 1\")   Wt 99.3 kg (218 lb 14.4 oz)   SpO2 95%   BMI 41.36 kg/m²     1. Have you been to the ER, urgent care clinic since your last visit?  Hospitalized since your last visit?no    2. Have you seen or consulted any other health care providers outside of the [facility] since your last visit?  Include any pap smears or colon screening. No    BMI - 41.5    Patient and provider made aware of elevated BP x2. Patient asymptomatic. Patient reminded to monitor BP, continue to take BP medications if prescribed, and follow up with PCP/Cardiologist.  Patient expressed understanding and agreement.    
lb 14.4 oz)   SpO2 95%   BMI 41.36 kg/m²   No LMP recorded. Patient is postmenopausal.      Physical Exam  Appearance: well,   Mental:A&O x 3, NAD  H:NC/AT,  EENT:   EOMI, PERRL, No scleral icterus  Neck: no bruit or JVD  CV: RRR no M/R/G  Lung: clear, No W/R  ABD: soft, active, nontender  Ext:  no Edema  Neuro: nonfocal          Assessment / Plan    Maria G Sanchez was seen today for Weight Management (1 month follow up)       1. Class 3 severe obesity due to excess calories with serious comorbidity and body mass index (BMI) of 40.0 to 44.9 in adult ([facility])  -     buPROPion (WELLBUTRIN SR) 100 MG extended release tablet; Take 1 tablet by mouth daily, Disp-90 tablet, R-1Normal  -     Comprehensive Metabolic Panel; Future    S/p GBP  Movement: Join [facility] for chair exerciseot pool exercise      meds: add wellbutrin  mg ea day     Eating plan: Continue the low jeremías eating plan using 2 robard meal replacements and one low carb meal        Sleep: aim for 7-8 hrs a day  2. Hyperlipidemia, unspecified hyperlipidemia type  -     Lipid Panel; Future  Lipitor 40 mg a day  The lifestyle changes also help control this    3. Primary hypertension  Lotrel 5-20 mg a day  Lasix 20 mg a day  Bp borderline high, need to continue meds. Does not need to increase dose since we are working on weight loss  Avoid sodium    4. Blood glucose elevated  -     Hemoglobin A1C; Future     We discussed Avoiding sweets and starches   April A1c was higher than the November 1.  Weight management poor control   Progress was reviewed with patient    2.  Labs    Latest results reviewed with patient       face to face time with Maria G consisted of counseling & coordinating and/or discussing treatment plans in reference to her obesity The primary encounter diagnosis was Class 3 severe obesity due to excess calories with serious comorbidity and body mass index (BMI) of 40.0 to 44.9 in adult ([facility]). Diagnoses of Hyperlipidemia, unspecified

## 2024-07-08 ENCOUNTER — NURSE ONLY (OUTPATIENT)
Age: 69
End: 2024-07-08

## 2024-07-08 VITALS
BODY MASS INDEX: 41.01 KG/M2 | SYSTOLIC BLOOD PRESSURE: 135 MMHG | OXYGEN SATURATION: 99 % | TEMPERATURE: 97.7 F | RESPIRATION RATE: 18 BRPM | WEIGHT: 217.2 LBS | HEIGHT: 61 IN | HEART RATE: 67 BPM | DIASTOLIC BLOOD PRESSURE: 89 MMHG

## 2024-07-08 ASSESSMENT — PATIENT HEALTH QUESTIONNAIRE - PHQ9
2. FEELING DOWN, DEPRESSED OR HOPELESS: NOT AT ALL
SUM OF ALL RESPONSES TO PHQ QUESTIONS 1-9: 0
SUM OF ALL RESPONSES TO PHQ9 QUESTIONS 1 & 2: 0
SUM OF ALL RESPONSES TO PHQ QUESTIONS 1-9: 0
1. LITTLE INTEREST OR PLEASURE IN DOING THINGS: NOT AT ALL

## 2024-07-25 ENCOUNTER — OFFICE VISIT (OUTPATIENT)
Age: 69
End: 2024-07-25

## 2024-07-25 DIAGNOSIS — E66.01 CLASS 3 SEVERE OBESITY DUE TO EXCESS CALORIES WITH SERIOUS COMORBIDITY AND BODY MASS INDEX (BMI) OF 40.0 TO 44.9 IN ADULT (HCC): Primary | ICD-10-CM

## 2024-07-29 NOTE — PROGRESS NOTES
[facility]  Metabolic Weight Loss Program        Patient's Name: Maria G Sanchez  : 1955    This patient is a participant at [facility] and attended the weekly virtual nutrition class hosted via [employer].      Shannan Conner, MS, RD, LDN

## 2024-08-02 ENCOUNTER — TRANSCRIBE ORDERS (OUTPATIENT)
Facility: HOSPITAL | Age: 69
End: 2024-08-02

## 2024-08-02 DIAGNOSIS — Z12.31 SCREENING MAMMOGRAM FOR BREAST CANCER: Primary | ICD-10-CM

## 2024-08-05 ENCOUNTER — TELEMEDICINE (OUTPATIENT)
Age: 69
End: 2024-08-05

## 2024-08-05 DIAGNOSIS — E66.01 CLASS 3 SEVERE OBESITY DUE TO EXCESS CALORIES WITH SERIOUS COMORBIDITY AND BODY MASS INDEX (BMI) OF 40.0 TO 44.9 IN ADULT (HCC): Primary | ICD-10-CM

## 2024-08-05 LAB — HBA1C MFR BLD: 5.6 % (ref 4.8–5.6)

## 2024-08-05 NOTE — PROGRESS NOTES
[facility]  Metabolic Program Follow-up Nutrition Consult    Date: 2024   Physician: MD Ashley  Name: Maria G Sanchez  :  1955    Type of Plan: LCD  Weeks on Plan: 17 months  Virtual visit was completed through Zoom.    ASSESSMENT:    Medications/Supplements:   Prior to Admission medications    Medication Sig Start Date End Date Taking? Authorizing Provider   buPROPion (WELLBUTRIN SR) 100 MG extended release tablet Take 1 tablet by mouth daily 24   Gabrielle Urbina MD   furosemide (LASIX) 20 MG tablet Take 1 tablet by mouth daily    Jatin Collins MD   montelukast (SINGULAIR) 10 MG tablet Take 1 tablet by mouth nightly    Jatin Collins MD   Cholecalciferol (VITAMIN D3) 50 MCG (2000) TABS TAKE 1 TABLET BY MOUTH DAILY 23   Rissa Hamilton MD   albuterol sulfate HFA (PROVENTIL;VENTOLIN;PROAIR) 108 (90 Base) MCG/ACT inhaler Inhale 2 puffs into the lungs every 4 hours as needed 17   Jatin Collins MD   albuterol (PROVENTIL) (2.5 MG/3ML) 0.083% nebulizer solution Inhale 3 mLs into the lungs    Jatin Collins MD   amLODIPine-benazepril (LOTREL) 5-20 MG per capsule Take 1 capsule by mouth daily 3/28/23   Jatin Collins MD   atorvastatin (LIPITOR) 40 MG tablet Take 1 tablet by mouth nightly 3/22/23   Jatin Collins MD   aspirin 81 MG chewable tablet Take 1 tablet by mouth daily    Jatin Collins MD   tiotropium (SPIRIVA RESPIMAT) 1.25 MCG/ACT AERS inhaler Inhale 2 puffs into the lungs daily    Jatin Collins MD   mepolizumab (NUCALA) 100 MG/ML SOSY injection Inject 1 mL into the skin every 30 days    Jatin Collins MD   fluticasone furoate-vilanterol (BREO ELLIPTA) 200-25 MCG/ACT AEPB inhaler Inhale 1 puff into the lungs as needed    Jatin Collins MD   azelastine (ASTELIN) 0.1 % nasal spray 2 sprays by Nasal route 2 times daily    Jatin Collins MD   fluticasone (FLONASE) 50 MCG/ACT

## 2024-08-06 LAB
ALBUMIN SERPL-MCNC: 4.1 G/DL (ref 3.9–4.9)
ALP SERPL-CCNC: 100 IU/L (ref 44–121)
ALT SERPL-CCNC: 23 IU/L (ref 0–32)
AST SERPL-CCNC: 25 IU/L (ref 0–40)
BILIRUB SERPL-MCNC: <0.2 MG/DL (ref 0–1.2)
BUN SERPL-MCNC: 20 MG/DL (ref 8–27)
BUN/CREAT SERPL: 27 (ref 12–28)
CALCIUM SERPL-MCNC: 9.9 MG/DL (ref 8.7–10.3)
CHLORIDE SERPL-SCNC: 104 MMOL/L (ref 96–106)
CHOLEST SERPL-MCNC: 169 MG/DL (ref 100–199)
CO2 SERPL-SCNC: 24 MMOL/L (ref 20–29)
CREAT SERPL-MCNC: 0.74 MG/DL (ref 0.57–1)
EGFRCR SERPLBLD CKD-EPI 2021: 87 ML/MIN/1.73
GLOBULIN SER CALC-MCNC: 2 G/DL (ref 1.5–4.5)
GLUCOSE SERPL-MCNC: 84 MG/DL (ref 70–99)
HDLC SERPL-MCNC: 90 MG/DL
LDLC SERPL CALC-MCNC: 67 MG/DL (ref 0–99)
POTASSIUM SERPL-SCNC: 4.4 MMOL/L (ref 3.5–5.2)
PROT SERPL-MCNC: 6.1 G/DL (ref 6–8.5)
SODIUM SERPL-SCNC: 142 MMOL/L (ref 134–144)
TRIGL SERPL-MCNC: 59 MG/DL (ref 0–149)
VLDLC SERPL CALC-MCNC: 12 MG/DL (ref 5–40)

## 2024-08-07 NOTE — TELEPHONE ENCOUNTER
Last appointment: 06/03/2024 MD Hamilton   Next appointment: 12/12/2024 MD Hamilton   Previous refill encounter(s):   03/28/2023 Lotrel #90 with 3 refills,   07/26/2023 Vitamin D3 #90 with 3 refills.     For Pharmacy Admin Tracking Only    Program: Medication Refill  Intervention Detail: New Rx: 2, reason: Patient Preference  Time Spent (min): 5  Requested Prescriptions     Pending Prescriptions Disp Refills    Cholecalciferol (VITAMIN D3) 50 MCG (2000 UT) TABS [Pharmacy Med Name: VITAMIN D3 50MCG TABLETS] 90 tablet 0     Sig: TAKE 1 TABLET BY MOUTH DAILY    amLODIPine-benazepril (LOTREL) 5-20 MG per capsule [Pharmacy Med Name: AMLODIPINE-BENAZ 5/20MG CAPSULES] 90 capsule 0     Sig: TAKE 1 CAPSULE BY MOUTH DAILY

## 2024-08-08 RX ORDER — CHOLECALCIFEROL (VITAMIN D3) 50 MCG
1 TABLET ORAL DAILY
Qty: 90 TABLET | Refills: 1 | Status: SHIPPED | OUTPATIENT
Start: 2024-08-08

## 2024-08-08 RX ORDER — AMLODIPINE BESYLATE AND BENAZEPRIL HYDROCHLORIDE 5; 20 MG/1; MG/1
1 CAPSULE ORAL DAILY
Qty: 90 CAPSULE | Refills: 1 | Status: SHIPPED | OUTPATIENT
Start: 2024-08-08

## 2024-08-13 ENCOUNTER — OFFICE VISIT (OUTPATIENT)
Age: 69
End: 2024-08-13

## 2024-08-13 ENCOUNTER — OFFICE VISIT (OUTPATIENT)
Age: 69
End: 2024-08-13
Payer: MEDICARE

## 2024-08-13 VITALS
RESPIRATION RATE: 18 BRPM | TEMPERATURE: 98 F | HEART RATE: 65 BPM | HEIGHT: 61 IN | OXYGEN SATURATION: 97 % | SYSTOLIC BLOOD PRESSURE: 136 MMHG | DIASTOLIC BLOOD PRESSURE: 86 MMHG | WEIGHT: 216.2 LBS | BODY MASS INDEX: 40.82 KG/M2

## 2024-08-13 DIAGNOSIS — I10 PRIMARY HYPERTENSION: ICD-10-CM

## 2024-08-13 DIAGNOSIS — E78.5 HYPERLIPIDEMIA, UNSPECIFIED HYPERLIPIDEMIA TYPE: ICD-10-CM

## 2024-08-13 DIAGNOSIS — E66.01 CLASS 3 SEVERE OBESITY DUE TO EXCESS CALORIES WITH SERIOUS COMORBIDITY AND BODY MASS INDEX (BMI) OF 40.0 TO 44.9 IN ADULT (HCC): Primary | ICD-10-CM

## 2024-08-13 DIAGNOSIS — R73.9 BLOOD GLUCOSE ELEVATED: ICD-10-CM

## 2024-08-13 PROCEDURE — 1123F ACP DISCUSS/DSCN MKR DOCD: CPT | Performed by: FAMILY MEDICINE

## 2024-08-13 PROCEDURE — 1090F PRES/ABSN URINE INCON ASSESS: CPT | Performed by: FAMILY MEDICINE

## 2024-08-13 PROCEDURE — 99214 OFFICE O/P EST MOD 30 MIN: CPT | Performed by: FAMILY MEDICINE

## 2024-08-13 PROCEDURE — 3075F SYST BP GE 130 - 139MM HG: CPT | Performed by: FAMILY MEDICINE

## 2024-08-13 PROCEDURE — 1036F TOBACCO NON-USER: CPT | Performed by: FAMILY MEDICINE

## 2024-08-13 PROCEDURE — G8399 PT W/DXA RESULTS DOCUMENT: HCPCS | Performed by: FAMILY MEDICINE

## 2024-08-13 PROCEDURE — 3017F COLORECTAL CA SCREEN DOC REV: CPT | Performed by: FAMILY MEDICINE

## 2024-08-13 PROCEDURE — G8417 CALC BMI ABV UP PARAM F/U: HCPCS | Performed by: FAMILY MEDICINE

## 2024-08-13 PROCEDURE — 3079F DIAST BP 80-89 MM HG: CPT | Performed by: FAMILY MEDICINE

## 2024-08-13 PROCEDURE — G8427 DOCREV CUR MEDS BY ELIG CLIN: HCPCS | Performed by: FAMILY MEDICINE

## 2024-08-13 ASSESSMENT — PATIENT HEALTH QUESTIONNAIRE - PHQ9
SUM OF ALL RESPONSES TO PHQ QUESTIONS 1-9: 0
SUM OF ALL RESPONSES TO PHQ QUESTIONS 1-9: 0
SUM OF ALL RESPONSES TO PHQ9 QUESTIONS 1 & 2: 0
SUM OF ALL RESPONSES TO PHQ QUESTIONS 1-9: 0
SUM OF ALL RESPONSES TO PHQ QUESTIONS 1-9: 0
2. FEELING DOWN, DEPRESSED OR HOPELESS: NOT AT ALL
1. LITTLE INTEREST OR PLEASURE IN DOING THINGS: NOT AT ALL

## 2024-08-13 NOTE — PROGRESS NOTES
[facility] Progress Note:   F/up Physician Visit    CC: Weight Management      Maria G Sanchez is a 68 y.o. female who is here for her  f/up physician visit for the  / LCD Program.  June 218  Now 216      Still off track with eating but since she is not home much she is not snacking as much          8/13/2024     8:04 AM 8/13/2024     8:00 AM 7/8/2024     8:09 AM 6/25/2024     8:20 AM 6/25/2024     8:00 AM 6/3/2024     8:02 AM 5/29/2024     8:18 AM   Weight Metrics   Weight 216 lb 3.2 oz  217 lb 3.2 oz 218 lb 14.4 oz  219 lb 1.6 oz 218 lb   Neck (Inches)  13 in   12.75 in     Waist Measure Inches  45 in   45.75 in     Body Fat %  48.4 %   47.7 %     BMI (Calculated) 40.9 kg/m2  41.1 kg/m2 41.4 kg/m2  41.5 kg/m2 41.3 kg/m2          No data to display                   Current Outpatient Medications   Medication Sig Dispense Refill    Cholecalciferol (VITAMIN D3) 50 MCG (2000 UT) TABS TAKE 1 TABLET BY MOUTH DAILY 90 tablet 1    amLODIPine-benazepril (LOTREL) 5-20 MG per capsule TAKE 1 CAPSULE BY MOUTH DAILY 90 capsule 1    buPROPion (WELLBUTRIN SR) 100 MG extended release tablet Take 1 tablet by mouth daily 90 tablet 1    furosemide (LASIX) 20 MG tablet Take 1 tablet by mouth daily      montelukast (SINGULAIR) 10 MG tablet Take 1 tablet by mouth nightly      albuterol sulfate HFA (PROVENTIL;VENTOLIN;PROAIR) 108 (90 Base) MCG/ACT inhaler Inhale 2 puffs into the lungs every 4 hours as needed      albuterol (PROVENTIL) (2.5 MG/3ML) 0.083% nebulizer solution Inhale 3 mLs into the lungs      atorvastatin (LIPITOR) 40 MG tablet Take 1 tablet by mouth nightly      aspirin 81 MG chewable tablet Take 1 tablet by mouth daily      tiotropium (SPIRIVA RESPIMAT) 1.25 MCG/ACT AERS inhaler Inhale 2 puffs into the lungs daily      mepolizumab (NUCALA) 100 MG/ML SOSY injection Inject 1 mL into the skin every 30 days      fluticasone furoate-vilanterol (BREO ELLIPTA) 200-25 MCG/ACT AEPB inhaler Inhale 1 puff

## 2024-08-13 NOTE — PROGRESS NOTES
Identified pt with two pt identifiers (name and ). Reviewed chart in preparation for visit and have obtained necessary documentation.    Maria G Sanchez is a 68 y.o. female  Chief Complaint   Patient presents with    Weight Management     1 month follow up     /86 (Site: Left Upper Arm, Position: Sitting, Cuff Size: Large Adult) Comment: manual  Pulse 65   Temp 98 °F (36.7 °C) (Oral)   Resp 18   Ht 1.549 m (5' 1\")   Wt 98.1 kg (216 lb 3.2 oz)   SpO2 97%   BMI 40.85 kg/m²     1. Have you been to the ER, urgent care clinic since your last visit?  Hospitalized since your last visit?no    2. Have you seen or consulted any other health care providers outside of the [facility] since your last visit?  Include any pap smears or colon screening. No    BMI - 40.9    Patient and provider made aware of elevated BP x2. Patient asymptomatic. Patient reminded to monitor BP, continue to take BP medications if prescribed, and follow up with PCP/Cardiologist.  Patient expressed understanding and agreement.

## 2024-08-19 ENCOUNTER — HOSPITAL ENCOUNTER (OUTPATIENT)
Facility: HOSPITAL | Age: 69
Discharge: HOME | End: 2024-08-22
Payer: MEDICARE

## 2024-08-19 VITALS — BODY MASS INDEX: 40.78 KG/M2 | HEIGHT: 61 IN | WEIGHT: 216 LBS

## 2024-08-19 DIAGNOSIS — Z12.31 SCREENING MAMMOGRAM FOR BREAST CANCER: ICD-10-CM

## 2024-08-19 PROCEDURE — 77067 SCR MAMMO BI INCL CAD: CPT

## 2024-08-26 ENCOUNTER — NURSE ONLY (OUTPATIENT)
Age: 69
End: 2024-08-26

## 2024-08-26 VITALS
BODY MASS INDEX: 40.29 KG/M2 | HEIGHT: 61 IN | HEART RATE: 66 BPM | DIASTOLIC BLOOD PRESSURE: 88 MMHG | TEMPERATURE: 97.8 F | OXYGEN SATURATION: 98 % | RESPIRATION RATE: 20 BRPM | SYSTOLIC BLOOD PRESSURE: 138 MMHG | WEIGHT: 213.4 LBS

## 2024-08-26 DIAGNOSIS — R73.9 BLOOD GLUCOSE ELEVATED: ICD-10-CM

## 2024-08-26 DIAGNOSIS — E66.01 CLASS 3 SEVERE OBESITY DUE TO EXCESS CALORIES WITH SERIOUS COMORBIDITY AND BODY MASS INDEX (BMI) OF 40.0 TO 44.9 IN ADULT (HCC): Primary | ICD-10-CM

## 2024-08-26 DIAGNOSIS — I10 PRIMARY HYPERTENSION: ICD-10-CM

## 2024-08-26 DIAGNOSIS — E78.5 HYPERLIPIDEMIA, UNSPECIFIED HYPERLIPIDEMIA TYPE: ICD-10-CM

## 2024-08-26 ASSESSMENT — PATIENT HEALTH QUESTIONNAIRE - PHQ9
SUM OF ALL RESPONSES TO PHQ QUESTIONS 1-9: 0
SUM OF ALL RESPONSES TO PHQ9 QUESTIONS 1 & 2: 0
2. FEELING DOWN, DEPRESSED OR HOPELESS: NOT AT ALL
1. LITTLE INTEREST OR PLEASURE IN DOING THINGS: NOT AT ALL

## 2024-08-26 NOTE — PROGRESS NOTES
Identified pt with two pt identifiers (name and ). Reviewed chart in preparation for visit and have obtained necessary documentation.    Maria G Sanchez is a 68 y.o. female  Chief Complaint   Patient presents with    Weight Management     /88 (Site: Left Upper Arm, Position: Sitting, Cuff Size: Large Adult) Comment: manual  Pulse 66   Temp 97.8 °F (36.6 °C) (Oral)   Resp 20   Ht 1.549 m (5' 1\")   Wt 96.8 kg (213 lb 6.4 oz)   SpO2 98%   BMI 40.32 kg/m²     1. Have you been to the ER, urgent care clinic since your last visit?  Hospitalized since your last visit?no    2. Have you seen or consulted any other health care providers outside of the [facility] since your last visit?  Include any pap smears or colon screening. No    Patient and provider made aware of elevated BP x2. Patient asymptomatic. Patient reminded to monitor BP, continue to take BP medications if prescribed, and follow up with PCP/Cardiologist.  Patient expressed understanding and agreement.       ambulatory

## 2024-08-26 NOTE — PROGRESS NOTES
Progress Note: Weekly Education Class in the [facility] Weight Loss Program         Patient is on Very Low Calorie Diet [] (4 meal replacements per day, 800 kcal/day)      Low Calorie Diet [x] (2-3 meal replacements per day, 2836-6977 kcal/day)    1) Did patient have any new symptoms or physical problems?   Yes []    No [x]    If yes, check & comment: weakness [], fatigue [], lightheadedness [], headache [], cramps [], cold intolerance [], hair loss [], diarrhea [], constipation [],  NA [] other:                                 2) Has patient had any medical attention from other providers, urgent care or the emergency room this week?  Yes []  No [x]       NA [], If yes, why:                                       3) Any other sugar sweetened beverages consumed this week?   Yes []  No [x]    4) Did patient have any problems adhering to the diet? Yes [x]  No [] NA []    If yes, Vacation [x], Celebrations [x], Conferences [], Family Reunions [] other:                                                5) How many hours of sleep this week? 5-7.5    (range)  NA []    Number of meal replacements consumed daily? 1-2 (range)  NA []    Average ounces of water patient consumed daily this week (not including shakes)? 29     (divide the weekly total by 7)    Did you eat any food outside of the program? Yes [x] No []    Physical Activity Over the Past Week:    Cardio exercise: 0 min  Strength exercise: 0 workouts / week  Number of steps walked per day: 1002-2078    How has patient mood overall been this week? Sad [], Happy [], Stressed [], Tired [], Content [], NA [], other NOT ANSWERED             Medications reconciled by nurse Yes [x]  No[]    Patient was given therapeutic recommendations for any noted side effects of their dietary approach based upon [facility] patient manual per providers recommendation.

## 2024-08-28 ENCOUNTER — OFFICE VISIT (OUTPATIENT)
Age: 69
End: 2024-08-28

## 2024-08-28 DIAGNOSIS — E66.01 CLASS 3 SEVERE OBESITY DUE TO EXCESS CALORIES WITH SERIOUS COMORBIDITY AND BODY MASS INDEX (BMI) OF 40.0 TO 44.9 IN ADULT (HCC): Primary | ICD-10-CM

## 2024-08-30 NOTE — PROGRESS NOTES
[facility]  Metabolic Weight Loss Program        Patient's Name: Maria G Sanchez  : 1955    This patient is a participant at [facility] and attended the weekly virtual nutrition class hosted via [email or web address].      Shannan Conner, MS, RD, LDN

## 2024-09-03 NOTE — PROGRESS NOTES
Nurse note from patient's weekly  / LCD / Maintenance class was reviewed.  Pertinent medical concerns were:   reviewed     BP Readings from Last 3 Encounters:   08/26/24 138/88   08/13/24 136/86   07/08/24 135/89       Failed to redirect to the Timeline version of the [facility] SmartLink.    Current Outpatient Medications   Medication Sig Dispense Refill    Cholecalciferol (VITAMIN D3) 50 MCG (2000 UT) TABS TAKE 1 TABLET BY MOUTH DAILY 90 tablet 1    amLODIPine-benazepril (LOTREL) 5-20 MG per capsule TAKE 1 CAPSULE BY MOUTH DAILY 90 capsule 1    buPROPion (WELLBUTRIN SR) 100 MG extended release tablet Take 1 tablet by mouth daily 90 tablet 1    furosemide (LASIX) 20 MG tablet Take 1 tablet by mouth daily      montelukast (SINGULAIR) 10 MG tablet Take 1 tablet by mouth nightly      albuterol sulfate HFA (PROVENTIL;VENTOLIN;PROAIR) 108 (90 Base) MCG/ACT inhaler Inhale 2 puffs into the lungs every 4 hours as needed      albuterol (PROVENTIL) (2.5 MG/3ML) 0.083% nebulizer solution Inhale 3 mLs into the lungs      atorvastatin (LIPITOR) 40 MG tablet Take 1 tablet by mouth nightly      aspirin 81 MG chewable tablet Take 1 tablet by mouth daily      tiotropium (SPIRIVA RESPIMAT) 1.25 MCG/ACT AERS inhaler Inhale 2 puffs into the lungs daily      mepolizumab (NUCALA) 100 MG/ML SOSY injection Inject 1 mL into the skin every 30 days      fluticasone furoate-vilanterol (BREO ELLIPTA) 200-25 MCG/ACT AEPB inhaler Inhale 1 puff into the lungs as needed      azelastine (ASTELIN) 0.1 % nasal spray 2 sprays by Nasal route 2 times daily      fluticasone (FLONASE) 50 MCG/ACT nasal spray 2 sprays by Nasal route daily as needed      ferrous sulfate (IRON 325) 325 (65 Fe) MG tablet Take 1 tablet by mouth every morning (before breakfast)      calcium carbonate 600 MG TABS tablet Take 1 tablet by mouth nightly      Multiple Vitamins-Minerals (ONE-A-DAY WOMENS 50+ PO) Take 1 tablet by mouth daily       No current facility-administered

## 2024-09-09 ENCOUNTER — NURSE ONLY (OUTPATIENT)
Age: 69
End: 2024-09-09

## 2024-09-09 ENCOUNTER — TELEMEDICINE (OUTPATIENT)
Age: 69
End: 2024-09-09

## 2024-09-09 VITALS
DIASTOLIC BLOOD PRESSURE: 89 MMHG | SYSTOLIC BLOOD PRESSURE: 134 MMHG | HEART RATE: 68 BPM | HEIGHT: 61 IN | WEIGHT: 215.5 LBS | TEMPERATURE: 98.4 F | OXYGEN SATURATION: 96 % | RESPIRATION RATE: 18 BRPM | BODY MASS INDEX: 40.69 KG/M2

## 2024-09-09 DIAGNOSIS — E66.01 CLASS 3 SEVERE OBESITY DUE TO EXCESS CALORIES WITH SERIOUS COMORBIDITY AND BODY MASS INDEX (BMI) OF 40.0 TO 44.9 IN ADULT (HCC): Primary | ICD-10-CM

## 2024-09-09 DIAGNOSIS — I10 PRIMARY HYPERTENSION: ICD-10-CM

## 2024-09-09 DIAGNOSIS — R73.9 BLOOD GLUCOSE ELEVATED: ICD-10-CM

## 2024-09-09 DIAGNOSIS — E66.813 CLASS 3 SEVERE OBESITY DUE TO EXCESS CALORIES WITH SERIOUS COMORBIDITY AND BODY MASS INDEX (BMI) OF 40.0 TO 44.9 IN ADULT: Primary | ICD-10-CM

## 2024-09-09 DIAGNOSIS — E78.5 HYPERLIPIDEMIA, UNSPECIFIED HYPERLIPIDEMIA TYPE: ICD-10-CM

## 2024-09-09 DIAGNOSIS — E66.01 CLASS 3 SEVERE OBESITY DUE TO EXCESS CALORIES WITH SERIOUS COMORBIDITY AND BODY MASS INDEX (BMI) OF 40.0 TO 44.9 IN ADULT: Primary | ICD-10-CM

## 2024-09-09 ASSESSMENT — PATIENT HEALTH QUESTIONNAIRE - PHQ9
SUM OF ALL RESPONSES TO PHQ9 QUESTIONS 1 & 2: 0
SUM OF ALL RESPONSES TO PHQ QUESTIONS 1-9: 0
SUM OF ALL RESPONSES TO PHQ QUESTIONS 1-9: 0
1. LITTLE INTEREST OR PLEASURE IN DOING THINGS: NOT AT ALL
SUM OF ALL RESPONSES TO PHQ QUESTIONS 1-9: 0
SUM OF ALL RESPONSES TO PHQ QUESTIONS 1-9: 0
2. FEELING DOWN, DEPRESSED OR HOPELESS: NOT AT ALL

## 2024-09-09 NOTE — PROGRESS NOTES
8/26/2024    Progress Note: Weekly Education Class in the [facility] Weight Loss Program         Patient is on Very Low Calorie Diet [] (4 meal replacements per day, 800 kcal/day)      Low Calorie Diet [x] (2-3 meal replacements per day, 9488-7191 kcal/day)    1) Did patient have any new symptoms or physical problems?   Yes []    No [x]    If yes, check & comment: weakness [], fatigue [], lightheadedness [], headache [], cramps [], cold intolerance [], hair loss [], diarrhea [], constipation [],  NA [] other:                                 2) Has patient had any medical attention from other providers, urgent care or the emergency room this week?  Yes []  No [x]       NA [], If yes, why:                                       3) Any other sugar sweetened beverages consumed this week?   Yes [x]  No []    4) Did patient have any problems adhering to the diet? Yes [x]  No [] NA []    If yes, Vacation [], Celebrations [], Conferences [], Family Reunions [] other:                                                5) How many hours of sleep this week? 6-8    (range)  NA []    Number of meal replacements consumed daily? 1-3 (range)  NA []    Average ounces of water patient consumed daily this week (not including shakes)? 32     (divide the weekly total by 7)    Did you eat any food outside of the program? Yes [] No [x]    Physical Activity Over the Past Week:    Cardio exercise: 0 min  Strength exercise: 0 workouts / week  Number of steps walked per day: 2738-5806    How has patient mood overall been this week? Sad [], Happy [], Stressed [], Tired [], Content [], NA [], other NOT ANSWERED             Medications reconciled by nurse Yes [x]  No[]    Patient was given therapeutic recommendations for any noted side effects of their dietary approach based upon [facility] patient manual per providers recommendation.     9/2/2024  Progress Note: Weekly Education Class in the [facility]         Patient is

## 2024-09-09 NOTE — PROGRESS NOTES
Identified pt with two pt identifiers (name and ). Reviewed chart in preparation for visit and have obtained necessary documentation.    Maria G Sanchez is a 68 y.o. female  Chief Complaint   Patient presents with    Weight Management     /89 (Site: Right Upper Arm, Position: Sitting, Cuff Size: Large Adult)   Pulse 68   Temp 98.4 °F (36.9 °C) (Oral)   Resp 18   Ht 1.549 m (5' 1\")   Wt 97.8 kg (215 lb 8 oz)   SpO2 96%   BMI 40.72 kg/m²     1. Have you been to the ER, urgent care clinic since your last visit?  Hospitalized since your last visit?no    2. Have you seen or consulted any other health care providers outside of the [facility] since your last visit?  Include any pap smears or colon screening. no

## 2024-09-17 ENCOUNTER — OFFICE VISIT (OUTPATIENT)
Age: 69
End: 2024-09-17

## 2024-09-17 DIAGNOSIS — E66.01 CLASS 3 SEVERE OBESITY DUE TO EXCESS CALORIES WITH SERIOUS COMORBIDITY AND BODY MASS INDEX (BMI) OF 40.0 TO 44.9 IN ADULT (HCC): Primary | ICD-10-CM

## 2024-09-24 ENCOUNTER — OFFICE VISIT (OUTPATIENT)
Age: 69
End: 2024-09-24
Payer: MEDICARE

## 2024-09-24 VITALS
HEIGHT: 61 IN | HEART RATE: 65 BPM | WEIGHT: 216.3 LBS | SYSTOLIC BLOOD PRESSURE: 127 MMHG | RESPIRATION RATE: 20 BRPM | TEMPERATURE: 98 F | OXYGEN SATURATION: 98 % | BODY MASS INDEX: 40.84 KG/M2 | DIASTOLIC BLOOD PRESSURE: 82 MMHG

## 2024-09-24 DIAGNOSIS — R73.9 BLOOD GLUCOSE ELEVATED: ICD-10-CM

## 2024-09-24 DIAGNOSIS — E78.5 HYPERLIPIDEMIA, UNSPECIFIED HYPERLIPIDEMIA TYPE: ICD-10-CM

## 2024-09-24 DIAGNOSIS — I10 PRIMARY HYPERTENSION: ICD-10-CM

## 2024-09-24 DIAGNOSIS — E66.01 CLASS 3 SEVERE OBESITY DUE TO EXCESS CALORIES WITH SERIOUS COMORBIDITY AND BODY MASS INDEX (BMI) OF 40.0 TO 44.9 IN ADULT: Primary | ICD-10-CM

## 2024-09-24 PROCEDURE — 3017F COLORECTAL CA SCREEN DOC REV: CPT | Performed by: FAMILY MEDICINE

## 2024-09-24 PROCEDURE — G8427 DOCREV CUR MEDS BY ELIG CLIN: HCPCS | Performed by: FAMILY MEDICINE

## 2024-09-24 PROCEDURE — 99214 OFFICE O/P EST MOD 30 MIN: CPT | Performed by: FAMILY MEDICINE

## 2024-09-24 PROCEDURE — 3074F SYST BP LT 130 MM HG: CPT | Performed by: FAMILY MEDICINE

## 2024-09-24 PROCEDURE — 1123F ACP DISCUSS/DSCN MKR DOCD: CPT | Performed by: FAMILY MEDICINE

## 2024-09-24 PROCEDURE — 3079F DIAST BP 80-89 MM HG: CPT | Performed by: FAMILY MEDICINE

## 2024-09-24 PROCEDURE — 1036F TOBACCO NON-USER: CPT | Performed by: FAMILY MEDICINE

## 2024-09-24 PROCEDURE — G8399 PT W/DXA RESULTS DOCUMENT: HCPCS | Performed by: FAMILY MEDICINE

## 2024-09-24 PROCEDURE — 1090F PRES/ABSN URINE INCON ASSESS: CPT | Performed by: FAMILY MEDICINE

## 2024-09-24 PROCEDURE — G8417 CALC BMI ABV UP PARAM F/U: HCPCS | Performed by: FAMILY MEDICINE

## 2024-09-24 ASSESSMENT — PATIENT HEALTH QUESTIONNAIRE - PHQ9
SUM OF ALL RESPONSES TO PHQ QUESTIONS 1-9: 0
2. FEELING DOWN, DEPRESSED OR HOPELESS: NOT AT ALL
SUM OF ALL RESPONSES TO PHQ QUESTIONS 1-9: 0
SUM OF ALL RESPONSES TO PHQ QUESTIONS 1-9: 0
SUM OF ALL RESPONSES TO PHQ9 QUESTIONS 1 & 2: 0
1. LITTLE INTEREST OR PLEASURE IN DOING THINGS: NOT AT ALL
SUM OF ALL RESPONSES TO PHQ QUESTIONS 1-9: 0

## 2024-09-30 NOTE — PROGRESS NOTES
Nurse note from patient's weekly / LCD / Maintenance class was reviewed.  Pertinent medical concerns were:   reviewed     BP Readings from Last 3 Encounters:   09/24/24 127/82   09/09/24 134/89   08/26/24 138/88       Failed to redirect to the Timeline version of the [facility] SmartLink.    Current Outpatient Medications   Medication Sig Dispense Refill    Cholecalciferol (VITAMIN D3) 50 MCG (2000 UT) TABS TAKE 1 TABLET BY MOUTH DAILY 90 tablet 1    amLODIPine-benazepril (LOTREL) 5-20 MG per capsule TAKE 1 CAPSULE BY MOUTH DAILY 90 capsule 1    buPROPion (WELLBUTRIN SR) 100 MG extended release tablet Take 1 tablet by mouth daily 90 tablet 1    furosemide (LASIX) 20 MG tablet Take 1 tablet by mouth daily      montelukast (SINGULAIR) 10 MG tablet Take 1 tablet by mouth nightly      albuterol sulfate HFA (PROVENTIL;VENTOLIN;PROAIR) 108 (90 Base) MCG/ACT inhaler Inhale 2 puffs into the lungs every 4 hours as needed      albuterol (PROVENTIL) (2.5 MG/3ML) 0.083% nebulizer solution Inhale 3 mLs into the lungs      atorvastatin (LIPITOR) 40 MG tablet Take 1 tablet by mouth nightly      aspirin 81 MG chewable tablet Take 1 tablet by mouth daily      tiotropium (SPIRIVA RESPIMAT) 1.25 MCG/ACT AERS inhaler Inhale 2 puffs into the lungs daily      mepolizumab (NUCALA) 100 MG/ML SOSY injection Inject 1 mL into the skin every 30 days      fluticasone furoate-vilanterol (BREO ELLIPTA) 200-25 MCG/ACT AEPB inhaler Inhale 1 puff into the lungs as needed      azelastine (ASTELIN) 0.1 % nasal spray 2 sprays by Nasal route 2 times daily      fluticasone (FLONASE) 50 MCG/ACT nasal spray 2 sprays by Nasal route daily as needed      ferrous sulfate (IRON 325) 325 (65 Fe) MG tablet Take 1 tablet by mouth every morning (before breakfast)      calcium carbonate 600 MG TABS tablet Take 1 tablet by mouth nightly      Multiple Vitamins-Minerals (ONE-A-DAY WOMENS 50+ PO) Take 1 tablet by mouth daily       No current facility-administered medications

## 2024-10-01 ENCOUNTER — OFFICE VISIT (OUTPATIENT)
Age: 69
End: 2024-10-01

## 2024-10-01 DIAGNOSIS — E66.01 CLASS 3 SEVERE OBESITY DUE TO EXCESS CALORIES WITH SERIOUS COMORBIDITY AND BODY MASS INDEX (BMI) OF 40.0 TO 44.9 IN ADULT: Primary | ICD-10-CM

## 2024-10-01 DIAGNOSIS — E66.813 CLASS 3 SEVERE OBESITY DUE TO EXCESS CALORIES WITH SERIOUS COMORBIDITY AND BODY MASS INDEX (BMI) OF 40.0 TO 44.9 IN ADULT: Primary | ICD-10-CM

## 2024-10-01 NOTE — PROGRESS NOTES
[facility]  Metabolic Weight Loss Program        Patient's Name: Maria G Sanchez  : 1955    This patient is a participant at [facility] and attended the weekly virtual nutrition class hosted via [employer].      Shannan Conner, MS, RD, LDN

## 2024-10-07 ENCOUNTER — NURSE ONLY (OUTPATIENT)
Age: 69
End: 2024-10-07

## 2024-10-07 VITALS
RESPIRATION RATE: 18 BRPM | WEIGHT: 216.1 LBS | DIASTOLIC BLOOD PRESSURE: 70 MMHG | SYSTOLIC BLOOD PRESSURE: 115 MMHG | BODY MASS INDEX: 40.8 KG/M2 | OXYGEN SATURATION: 94 % | HEART RATE: 64 BPM | TEMPERATURE: 98.2 F | HEIGHT: 61 IN

## 2024-10-07 DIAGNOSIS — E66.01 CLASS 3 SEVERE OBESITY DUE TO EXCESS CALORIES WITH SERIOUS COMORBIDITY AND BODY MASS INDEX (BMI) OF 40.0 TO 44.9 IN ADULT: Primary | ICD-10-CM

## 2024-10-07 DIAGNOSIS — E78.5 HYPERLIPIDEMIA, UNSPECIFIED HYPERLIPIDEMIA TYPE: ICD-10-CM

## 2024-10-07 DIAGNOSIS — R73.9 BLOOD GLUCOSE ELEVATED: ICD-10-CM

## 2024-10-07 DIAGNOSIS — E66.813 CLASS 3 SEVERE OBESITY DUE TO EXCESS CALORIES WITH SERIOUS COMORBIDITY AND BODY MASS INDEX (BMI) OF 40.0 TO 44.9 IN ADULT: Primary | ICD-10-CM

## 2024-10-07 DIAGNOSIS — I10 PRIMARY HYPERTENSION: ICD-10-CM

## 2024-10-07 ASSESSMENT — PATIENT HEALTH QUESTIONNAIRE - PHQ9
SUM OF ALL RESPONSES TO PHQ QUESTIONS 1-9: 0
1. LITTLE INTEREST OR PLEASURE IN DOING THINGS: NOT AT ALL
SUM OF ALL RESPONSES TO PHQ QUESTIONS 1-9: 0
2. FEELING DOWN, DEPRESSED OR HOPELESS: NOT AT ALL
SUM OF ALL RESPONSES TO PHQ9 QUESTIONS 1 & 2: 0

## 2024-10-07 NOTE — PROGRESS NOTES
Progress Note: Weekly Education Class in the [facility] Weight Loss Program         Patient is on Very Low Calorie Diet [] (4 meal replacements per day, 800 kcal/day)      Low Calorie Diet [] (2-3 meal replacements per day, 1042-8607 kcal/day)    1) Did patient have any new symptoms or physical problems?   Yes []    No [x]    If yes, check & comment: weakness [], fatigue [], lightheadedness [], headache [], cramps [], cold intolerance [], hair loss [], diarrhea [], constipation [],  NA [] other:                                 2) Has patient had any medical attention from other providers, urgent care or the emergency room this week?  Yes []  No [x]       NA [], If yes, why:                                       3) Any other sugar sweetened beverages consumed this week?   Yes [x]  No []    4) Did patient have any problems adhering to the diet? Yes [x]  No [] NA []    If yes, Vacation [], Celebrations [x], Conferences [], Family Reunions [] other:                                                5) How many hours of sleep this week? 4.5-7    (range)  NA []    Number of meal replacements consumed daily? 1-3 (range)  NA []    Average ounces of water patient consumed daily this week (not including shakes)? 30     (divide the weekly total by 7)    Did you eat any food outside of the program? Yes [x] No []    Physical Activity Over the Past Week:    Cardio exercise: 0 min  Strength exercise: 0 workouts / week  Number of steps walked per day: 5846-1754    How has patient mood overall been this week? Sad [], Happy [], Stressed [], Tired [], Content [], NA [], other NOT ANSWERED             Medications reconciled by nurse Yes [x]  No[]    Patient was given therapeutic recommendations for any noted side effects of their dietary approach based upon [facility] patient manual per providers recommendation.

## 2024-10-07 NOTE — PROGRESS NOTES
Identified pt with two pt identifiers (name and ). Reviewed chart in preparation for visit and have obtained necessary documentation.    Maria G Sanchez is a 68 y.o. female  Chief Complaint   Patient presents with    Weight Management     /70 (Site: Left Upper Arm, Position: Sitting, Cuff Size: Large Adult)   Pulse 64   Temp 98.2 °F (36.8 °C) (Oral)   Resp 18   Ht 1.549 m (5' 1\")   Wt 98 kg (216 lb 1.6 oz)   SpO2 94%   BMI 40.83 kg/m²     1. Have you been to the ER, urgent care clinic since your last visit?  Hospitalized since your last visit?no    2. Have you seen or consulted any other health care providers outside of the [facility] since your last visit?  Include any pap smears or colon screening. No    Patient and provider made aware of elevated BP x2. Patient asymptomatic. Patient reminded to monitor BP, continue to take BP medications if prescribed, and follow up with PCP/Cardiologist.  Patient expressed understanding and agreement.

## 2024-10-08 RX ORDER — ATORVASTATIN CALCIUM 40 MG/1
40 TABLET, FILM COATED ORAL NIGHTLY
Qty: 90 TABLET | Refills: 0 | Status: SHIPPED | OUTPATIENT
Start: 2024-10-08

## 2024-10-17 ENCOUNTER — OFFICE VISIT (OUTPATIENT)
Age: 69
End: 2024-10-17

## 2024-10-17 DIAGNOSIS — E66.813 CLASS 3 SEVERE OBESITY DUE TO EXCESS CALORIES WITH SERIOUS COMORBIDITY AND BODY MASS INDEX (BMI) OF 40.0 TO 44.9 IN ADULT: Primary | ICD-10-CM

## 2024-10-17 DIAGNOSIS — E66.01 CLASS 3 SEVERE OBESITY DUE TO EXCESS CALORIES WITH SERIOUS COMORBIDITY AND BODY MASS INDEX (BMI) OF 40.0 TO 44.9 IN ADULT: Primary | ICD-10-CM

## 2024-10-18 NOTE — PROGRESS NOTES
[facility]  Metabolic Weight Loss Program        Patient's Name: Maria G Sanchez  : 1955    This patient is a participant at [facility] and attended the weekly virtual nutrition class hosted via [employer].      Shannan Conner, MS, RD, LDN

## 2024-10-28 ENCOUNTER — TELEPHONE (OUTPATIENT)
Age: 69
End: 2024-10-28

## 2024-10-28 NOTE — TELEPHONE ENCOUNTER
Called patient to let her know I am starting our virtual nutrition consult scheduled today 10/28/24 at 9:00am.  Left a voicemail letting patient know I would remain logged in another few minutes but to call the office to cancel/reschedule appointment.  [phone].

## 2024-10-29 ENCOUNTER — OFFICE VISIT (OUTPATIENT)
Age: 69
End: 2024-10-29

## 2024-10-29 DIAGNOSIS — E66.01 CLASS 3 SEVERE OBESITY DUE TO EXCESS CALORIES WITH SERIOUS COMORBIDITY AND BODY MASS INDEX (BMI) OF 40.0 TO 44.9 IN ADULT: Primary | ICD-10-CM

## 2024-10-29 DIAGNOSIS — E66.813 CLASS 3 SEVERE OBESITY DUE TO EXCESS CALORIES WITH SERIOUS COMORBIDITY AND BODY MASS INDEX (BMI) OF 40.0 TO 44.9 IN ADULT: Primary | ICD-10-CM

## 2024-10-29 NOTE — PROGRESS NOTES
[facility]  Metabolic Program Follow-up Nutrition Consult    Date: 10/29/2024   Physician: PRINCE Urbina MD  Name: Maria G Sanchez  :  1955    Type of Plan: LCD  Weeks on Plan: 19 months  Virtual visit was completed through Zoom.    ASSESSMENT:    Medications/Supplements:   Prior to Admission medications    Medication Sig Start Date End Date Taking? Authorizing Provider   atorvastatin (LIPITOR) 40 MG tablet TAKE 1 TABLET BY MOUTH EVERY NIGHT 10/8/24   Alka Jimenez, APRN - NP   Cholecalciferol (VITAMIN D3) 50 MCG (2000) TABS TAKE 1 TABLET BY MOUTH DAILY 24   Rissa Hamilton MD   amLODIPine-benazepril (LOTREL) 5-20 MG per capsule TAKE 1 CAPSULE BY MOUTH DAILY 24   Rissa Hamilton MD   buPROPion (WELLBUTRIN SR) 100 MG extended release tablet Take 1 tablet by mouth daily 24   Gabrielle Urbina MD   furosemide (LASIX) 20 MG tablet Take 1 tablet by mouth daily    Jatin Collins MD   montelukast (SINGULAIR) 10 MG tablet Take 1 tablet by mouth nightly    Jatin Collins MD   albuterol sulfate HFA (PROVENTIL;VENTOLIN;PROAIR) 108 (90 Base) MCG/ACT inhaler Inhale 2 puffs into the lungs every 4 hours as needed 17   Jatin Collins MD   albuterol (PROVENTIL) (2.5 MG/3ML) 0.083% nebulizer solution Inhale 3 mLs into the lungs    Jatin Collins MD   aspirin 81 MG chewable tablet Take 1 tablet by mouth daily    Jatin Collins MD   tiotropium (SPIRIVA RESPIMAT) 1.25 MCG/ACT AERS inhaler Inhale 2 puffs into the lungs daily    Jatin Collins MD   mepolizumab (NUCALA) 100 MG/ML SOSY injection Inject 1 mL into the skin every 30 days    Jatin Collins MD   fluticasone furoate-vilanterol (BREO ELLIPTA) 200-25 MCG/ACT AEPB inhaler Inhale 1 puff into the lungs as needed    Jatin Collins MD   azelastine (ASTELIN) 0.1 % nasal spray 2 sprays by Nasal route 2 times daily    Jatin Collins MD   fluticasone (FLONASE) 50

## 2024-10-30 NOTE — PROGRESS NOTES
[facility]  Metabolic Weight Loss Program        Patient's Name: Maria G Sanchez  : 1955    This patient is a participant at [facility] and attended the weekly virtual nutrition class hosted via [employer].      Shannan Conner, MS, RD, LDN

## 2024-10-31 ENCOUNTER — OFFICE VISIT (OUTPATIENT)
Age: 69
End: 2024-10-31

## 2024-10-31 VITALS
HEIGHT: 61 IN | SYSTOLIC BLOOD PRESSURE: 138 MMHG | BODY MASS INDEX: 40.4 KG/M2 | TEMPERATURE: 98.1 F | RESPIRATION RATE: 20 BRPM | DIASTOLIC BLOOD PRESSURE: 84 MMHG | WEIGHT: 214 LBS | OXYGEN SATURATION: 98 % | HEART RATE: 69 BPM

## 2024-10-31 DIAGNOSIS — E66.01 CLASS 3 SEVERE OBESITY DUE TO EXCESS CALORIES WITH SERIOUS COMORBIDITY AND BODY MASS INDEX (BMI) OF 40.0 TO 44.9 IN ADULT: Primary | ICD-10-CM

## 2024-10-31 DIAGNOSIS — E66.813 CLASS 3 SEVERE OBESITY DUE TO EXCESS CALORIES WITH SERIOUS COMORBIDITY AND BODY MASS INDEX (BMI) OF 40.0 TO 44.9 IN ADULT: Primary | ICD-10-CM

## 2024-10-31 DIAGNOSIS — E78.5 HYPERLIPIDEMIA, UNSPECIFIED HYPERLIPIDEMIA TYPE: ICD-10-CM

## 2024-10-31 DIAGNOSIS — I10 PRIMARY HYPERTENSION: ICD-10-CM

## 2024-10-31 DIAGNOSIS — R73.9 BLOOD GLUCOSE ELEVATED: ICD-10-CM

## 2024-10-31 RX ORDER — BENZONATATE 100 MG/1
100 CAPSULE ORAL DAILY
COMMUNITY
Start: 2024-10-18

## 2024-10-31 ASSESSMENT — PATIENT HEALTH QUESTIONNAIRE - PHQ9
2. FEELING DOWN, DEPRESSED OR HOPELESS: NOT AT ALL
SUM OF ALL RESPONSES TO PHQ QUESTIONS 1-9: 0
1. LITTLE INTEREST OR PLEASURE IN DOING THINGS: NOT AT ALL
SUM OF ALL RESPONSES TO PHQ9 QUESTIONS 1 & 2: 0
SUM OF ALL RESPONSES TO PHQ QUESTIONS 1-9: 0

## 2024-10-31 NOTE — PROGRESS NOTES
[facility] Progress Note:   F/up Physician Visit    CC: Weight Management      Maria G Sanchez is a 69 y.o. female who is here for her  f/up physician visit for the  / LCD Program.  Sept  216  Now 214    Start 244        10/31/2024     8:46 AM 10/31/2024     8:00 AM 10/7/2024     8:32 AM 9/24/2024     9:00 AM 9/24/2024     8:55 AM 9/9/2024     9:49 AM 8/26/2024     9:05 AM   Weight Metrics   Weight 214 lb  216 lb 1.6 oz  216 lb 4.8 oz 215 lb 8 oz 213 lb 6.4 oz   Neck (Inches)  13 in  12.5 in      Waist Measure Inches  44 in  42.75 in      Body Fat %  48.4 %  48.1 %      BMI (Calculated) 40.5 kg/m2  40.9 kg/m2  41 kg/m2 40.8 kg/m2 40.4 kg/m2          No data to display                   Current Outpatient Medications   Medication Sig Dispense Refill    benzonatate (TESSALON) 100 MG capsule Take 1 capsule by mouth daily      atorvastatin (LIPITOR) 40 MG tablet TAKE 1 TABLET BY MOUTH EVERY NIGHT 90 tablet 0    Cholecalciferol (VITAMIN D3) 50 MCG (2000 UT) TABS TAKE 1 TABLET BY MOUTH DAILY 90 tablet 1    amLODIPine-benazepril (LOTREL) 5-20 MG per capsule TAKE 1 CAPSULE BY MOUTH DAILY 90 capsule 1    buPROPion (WELLBUTRIN SR) 100 MG extended release tablet Take 1 tablet by mouth daily 90 tablet 1    furosemide (LASIX) 20 MG tablet Take 1 tablet by mouth daily      montelukast (SINGULAIR) 10 MG tablet Take 1 tablet by mouth nightly      albuterol sulfate HFA (PROVENTIL;VENTOLIN;PROAIR) 108 (90 Base) MCG/ACT inhaler Inhale 2 puffs into the lungs every 4 hours as needed      albuterol (PROVENTIL) (2.5 MG/3ML) 0.083% nebulizer solution Inhale 3 mLs into the lungs      aspirin 81 MG chewable tablet Take 1 tablet by mouth daily      tiotropium (SPIRIVA RESPIMAT) 1.25 MCG/ACT AERS inhaler Inhale 2 puffs into the lungs daily      mepolizumab (NUCALA) 100 MG/ML SOSY injection Inject 1 mL into the skin every 30 days      fluticasone furoate-vilanterol (BREO ELLIPTA) 200-25 MCG/ACT AEPB inhaler Inhale 1 puff

## 2024-10-31 NOTE — PROGRESS NOTES
Identified pt with two pt identifiers (name and ). Reviewed chart in preparation for visit and have obtained necessary documentation.    Maria G Sanchez is a 69 y.o. female  Chief Complaint   Patient presents with    Weight Management     1 month follow up     /84 (Site: Left Upper Arm, Position: Sitting, Cuff Size: Large Adult)   Pulse 69   Temp 98.1 °F (36.7 °C) (Oral)   Resp 20   Ht 1.549 m (5' 1\")   Wt 97.1 kg (214 lb)   SpO2 98%   BMI 40.43 kg/m²     1. Have you been to the ER, urgent care clinic since your last visit?  Hospitalized since your last visit?yes - Urgent Care for cough - tested for COVID and pneumonia - Negative    2. Have you seen or consulted any other health care providers outside of the [facility] since your last visit?  Include any pap smears or colon screening. No    BMI - 40.5    Patient and provider made aware of elevated BP x2. Patient asymptomatic. Patient reminded to monitor BP, continue to take BP medications if prescribed, and follow up with PCP/Cardiologist.  Patient expressed understanding and agreement.

## 2024-11-12 ENCOUNTER — OFFICE VISIT (OUTPATIENT)
Age: 69
End: 2024-11-12

## 2024-11-12 DIAGNOSIS — E66.01 CLASS 3 SEVERE OBESITY DUE TO EXCESS CALORIES WITH SERIOUS COMORBIDITY AND BODY MASS INDEX (BMI) OF 40.0 TO 44.9 IN ADULT: Primary | ICD-10-CM

## 2024-11-12 DIAGNOSIS — E66.813 CLASS 3 SEVERE OBESITY DUE TO EXCESS CALORIES WITH SERIOUS COMORBIDITY AND BODY MASS INDEX (BMI) OF 40.0 TO 44.9 IN ADULT: Primary | ICD-10-CM

## 2024-11-13 ENCOUNTER — NURSE ONLY (OUTPATIENT)
Age: 69
End: 2024-11-13

## 2024-11-13 VITALS
HEART RATE: 62 BPM | WEIGHT: 214.6 LBS | RESPIRATION RATE: 20 BRPM | TEMPERATURE: 98.3 F | DIASTOLIC BLOOD PRESSURE: 88 MMHG | HEIGHT: 61 IN | SYSTOLIC BLOOD PRESSURE: 142 MMHG | OXYGEN SATURATION: 95 % | BODY MASS INDEX: 40.52 KG/M2

## 2024-11-13 DIAGNOSIS — R73.9 BLOOD GLUCOSE ELEVATED: ICD-10-CM

## 2024-11-13 DIAGNOSIS — E78.5 HYPERLIPIDEMIA, UNSPECIFIED HYPERLIPIDEMIA TYPE: ICD-10-CM

## 2024-11-13 DIAGNOSIS — E66.813 CLASS 3 SEVERE OBESITY DUE TO EXCESS CALORIES WITH SERIOUS COMORBIDITY AND BODY MASS INDEX (BMI) OF 40.0 TO 44.9 IN ADULT: Primary | ICD-10-CM

## 2024-11-13 DIAGNOSIS — E66.01 CLASS 3 SEVERE OBESITY DUE TO EXCESS CALORIES WITH SERIOUS COMORBIDITY AND BODY MASS INDEX (BMI) OF 40.0 TO 44.9 IN ADULT: Primary | ICD-10-CM

## 2024-11-13 DIAGNOSIS — I10 PRIMARY HYPERTENSION: ICD-10-CM

## 2024-11-13 NOTE — PROGRESS NOTES
Progress Note: Weekly Education Class in the [facility]         Patient is on Very Low Calorie Diet [] (4 meal replacements per day, 800 kcal/day)      Low Calorie Diet [x] (2-3 meal replacements per day, 1592-2706 kcal/day)    1) Did patient have any new symptoms or physical problems?   Yes []    No [x]    If yes, check & comment: weakness [], fatigue [], lightheadedness [], headache [], cramps [], cold intolerance [], hair loss [], diarrhea [], constipation [],  NA [] other:                                 2) Has patient had any medical attention from other providers, urgent care or the emergency room this week?  Yes []  No [x]       NA [], If yes, why:                                       3) Any other sugar sweetened beverages consumed this week?   Yes []  No [x]    4) Did patient have any problems adhering to the diet? Yes [x]  No [] NA []    If yes, Vacation [x], Celebrations [x], Conferences [], Family Reunions [] other:                                                5) How many hours of sleep this week? 5-7    (range)  NA []    Number of meal replacements consumed daily? 1-2 (range)  NA []    Average ounces of water patient consumed daily this week (not including shakes)? 21     (divide the weekly total by 7)    Did you eat any food outside of the program? Yes [] No [x]    Physical Activity Over the Past Week:    Cardio exercise: 0 min  Strength exercise: 0 workouts / week  Number of steps walked per day: 2244-0666    How has patient mood overall been this week? Sad [], Happy [], Stressed [], Tired [], Content [], NA [], other NOT ANSWERED             Medications reconciled by nurse Yes [x]  No[]    Patient was given therapeutic recommendations for any noted side effects of their dietary approach based upon [facility] patient manual per providers recommendation.

## 2024-11-13 NOTE — PROGRESS NOTES
[facility]  Metabolic Weight Loss Program        Patient's Name: Maria G Sanchez  : 1955    This patient is a participant at [facility] and attended the weekly virtual nutrition class hosted via [employer].      Shannan Conner, MS, RD, LDN

## 2024-11-13 NOTE — PROGRESS NOTES
Identified pt with two pt identifiers (name and ). Reviewed chart in preparation for visit and have obtained necessary documentation.    Maria G Sanchez is a 69 y.o. female  Chief Complaint   Patient presents with    Weight Management     BP (!) 142/88 (Site: Left Upper Arm, Position: Sitting, Cuff Size: Large Adult)   Pulse 62   Temp 98.3 °F (36.8 °C) (Oral)   Resp 20   Ht 1.549 m (5' 1\")   Wt 97.3 kg (214 lb 9.6 oz)   SpO2 95%   BMI 40.55 kg/m²     1. Have you been to the ER, urgent care clinic since your last visit?  Hospitalized since your last visit?no    2. Have you seen or consulted any other health care providers outside of the [facility] since your last visit?  Include any pap smears or colon screening. No    Patient and provider made aware of elevated BP x2. Patient asymptomatic. Patient reminded to monitor BP, continue to take BP medications if prescribed, and follow up with PCP/Cardiologist.  Patient expressed understanding and agreement.

## 2024-12-02 ENCOUNTER — NURSE ONLY (OUTPATIENT)
Age: 69
End: 2024-12-02

## 2024-12-02 VITALS
TEMPERATURE: 98.1 F | DIASTOLIC BLOOD PRESSURE: 87 MMHG | BODY MASS INDEX: 40.54 KG/M2 | SYSTOLIC BLOOD PRESSURE: 128 MMHG | HEART RATE: 63 BPM | HEIGHT: 61 IN | WEIGHT: 214.7 LBS | RESPIRATION RATE: 18 BRPM | OXYGEN SATURATION: 97 %

## 2024-12-02 DIAGNOSIS — E66.01 CLASS 3 SEVERE OBESITY DUE TO EXCESS CALORIES WITH SERIOUS COMORBIDITY AND BODY MASS INDEX (BMI) OF 40.0 TO 44.9 IN ADULT: Primary | ICD-10-CM

## 2024-12-02 DIAGNOSIS — R73.9 BLOOD GLUCOSE ELEVATED: ICD-10-CM

## 2024-12-02 DIAGNOSIS — E78.5 HYPERLIPIDEMIA, UNSPECIFIED HYPERLIPIDEMIA TYPE: ICD-10-CM

## 2024-12-02 DIAGNOSIS — I10 PRIMARY HYPERTENSION: ICD-10-CM

## 2024-12-02 DIAGNOSIS — E66.813 CLASS 3 SEVERE OBESITY DUE TO EXCESS CALORIES WITH SERIOUS COMORBIDITY AND BODY MASS INDEX (BMI) OF 40.0 TO 44.9 IN ADULT: Primary | ICD-10-CM

## 2024-12-02 ASSESSMENT — PATIENT HEALTH QUESTIONNAIRE - PHQ9
SUM OF ALL RESPONSES TO PHQ QUESTIONS 1-9: 0
SUM OF ALL RESPONSES TO PHQ9 QUESTIONS 1 & 2: 0
SUM OF ALL RESPONSES TO PHQ QUESTIONS 1-9: 0
2. FEELING DOWN, DEPRESSED OR HOPELESS: NOT AT ALL
SUM OF ALL RESPONSES TO PHQ QUESTIONS 1-9: 0
SUM OF ALL RESPONSES TO PHQ QUESTIONS 1-9: 0
1. LITTLE INTEREST OR PLEASURE IN DOING THINGS: NOT AT ALL

## 2024-12-02 NOTE — PROGRESS NOTES
Identified pt with two pt identifiers (name and ). Reviewed chart in preparation for visit and have obtained necessary documentation.    Maria G Sanchez is a 69 y.o. female  Chief Complaint   Patient presents with    Weight Management     /87 (Site: Right Upper Arm, Position: Sitting, Cuff Size: Large Adult)   Pulse 63   Temp 98.1 °F (36.7 °C) (Oral)   Resp 18   Ht 1.549 m (5' 1\")   Wt 97.4 kg (214 lb 11.2 oz)   SpO2 97%   BMI 40.57 kg/m²     1. Have you been to the ER, urgent care clinic since your last visit?  Hospitalized since your last visit?no    2. Have you seen or consulted any other health care providers outside of the [facility] since your last visit?  Include any pap smears or colon screening. no

## 2024-12-02 NOTE — PROGRESS NOTES
Progress Note: Weekly Education Class in the [facility] Loss Program         Patient is on Very Low Calorie Diet [] (4 meal replacements per day, 800 kcal/day)      Low Calorie Diet [x] (2-3 meal replacements per day, 6815-4247 kcal/day)    1) Did patient have any new symptoms or physical problems?   Yes []    No [x]    If yes, check & comment: weakness [], fatigue [], lightheadedness [], headache [], cramps [], cold intolerance [], hair loss [], diarrhea [], constipation [],  NA [] other:                                 2) Has patient had any medical attention from other providers, urgent care or the emergency room this week?  Yes []  No [x]       NA [], If yes, why:                                       3) Any other sugar sweetened beverages consumed this week?   Yes [x]  No []    4) Did patient have any problems adhering to the diet? Yes [x]  No [] NA []    If yes, Vacation [], Celebrations [x], Conferences [], Family Reunions [] other:                                                5) How many hours of sleep this week? 6-7.5    (range)  NA []    Number of meal replacements consumed daily? 1-3 (range)  NA []    Average ounces of water patient consumed daily this week (not including shakes)? 25     (divide the weekly total by 7)    Did you eat any food outside of the program? Yes [x] No []    Physical Activity Over the Past Week:    Cardio exercise: 0 min  Strength exercise: 0 workouts / week  Number of steps walked per day: 1258-4645    How has patient mood overall been this week? Sad [], Happy [], Stressed [], Tired [], Content [], NA [], other NOT ANSWERED             Medications reconciled by nurse Yes [x]  No[]    Patient was given therapeutic recommendations for any noted side effects of their dietary approach based upon [facility] patient manual per providers recommendation.

## 2024-12-06 ENCOUNTER — OFFICE VISIT (OUTPATIENT)
Facility: CLINIC | Age: 69
End: 2024-12-06

## 2024-12-06 ENCOUNTER — TELEPHONE (OUTPATIENT)
Facility: CLINIC | Age: 69
End: 2024-12-06

## 2024-12-06 VITALS
DIASTOLIC BLOOD PRESSURE: 81 MMHG | RESPIRATION RATE: 18 BRPM | TEMPERATURE: 97.7 F | OXYGEN SATURATION: 99 % | HEIGHT: 61 IN | WEIGHT: 218.6 LBS | BODY MASS INDEX: 41.27 KG/M2 | SYSTOLIC BLOOD PRESSURE: 144 MMHG | HEART RATE: 66 BPM

## 2024-12-06 DIAGNOSIS — M51.362 DEGENERATION OF INTERVERTEBRAL DISC OF LUMBAR REGION WITH DISCOGENIC BACK PAIN AND LOWER EXTREMITY PAIN: ICD-10-CM

## 2024-12-06 DIAGNOSIS — E78.5 HYPERLIPIDEMIA, UNSPECIFIED HYPERLIPIDEMIA TYPE: ICD-10-CM

## 2024-12-06 DIAGNOSIS — Z86.73 PERSONAL HISTORY OF TIA (TRANSIENT ISCHEMIC ATTACK): ICD-10-CM

## 2024-12-06 DIAGNOSIS — Z00.00 ENCOUNTER FOR MEDICARE ANNUAL WELLNESS EXAM: ICD-10-CM

## 2024-12-06 DIAGNOSIS — I10 PRIMARY HYPERTENSION: Primary | ICD-10-CM

## 2024-12-06 DIAGNOSIS — E66.01 MORBID OBESITY: ICD-10-CM

## 2024-12-06 DIAGNOSIS — M47.26 OSTEOARTHRITIS OF SPINE WITH RADICULOPATHY, LUMBAR REGION: ICD-10-CM

## 2024-12-06 DIAGNOSIS — Z98.84 BARIATRIC SURGERY STATUS: ICD-10-CM

## 2024-12-06 SDOH — ECONOMIC STABILITY: FOOD INSECURITY: WITHIN THE PAST 12 MONTHS, THE FOOD YOU BOUGHT JUST DIDN'T LAST AND YOU DIDN'T HAVE MONEY TO GET MORE.: NEVER TRUE

## 2024-12-06 SDOH — ECONOMIC STABILITY: FOOD INSECURITY: WITHIN THE PAST 12 MONTHS, YOU WORRIED THAT YOUR FOOD WOULD RUN OUT BEFORE YOU GOT MONEY TO BUY MORE.: NEVER TRUE

## 2024-12-06 SDOH — ECONOMIC STABILITY: INCOME INSECURITY: HOW HARD IS IT FOR YOU TO PAY FOR THE VERY BASICS LIKE FOOD, HOUSING, MEDICAL CARE, AND HEATING?: NOT HARD AT ALL

## 2024-12-06 ASSESSMENT — PATIENT HEALTH QUESTIONNAIRE - PHQ9
SUM OF ALL RESPONSES TO PHQ9 QUESTIONS 1 & 2: 0
SUM OF ALL RESPONSES TO PHQ QUESTIONS 1-9: 0
1. LITTLE INTEREST OR PLEASURE IN DOING THINGS: NOT AT ALL
SUM OF ALL RESPONSES TO PHQ QUESTIONS 1-9: 0
2. FEELING DOWN, DEPRESSED OR HOPELESS: NOT AT ALL
SUM OF ALL RESPONSES TO PHQ QUESTIONS 1-9: 0
SUM OF ALL RESPONSES TO PHQ QUESTIONS 1-9: 0

## 2024-12-06 ASSESSMENT — LIFESTYLE VARIABLES
HOW MANY STANDARD DRINKS CONTAINING ALCOHOL DO YOU HAVE ON A TYPICAL DAY: 1 OR 2
HOW OFTEN DO YOU HAVE A DRINK CONTAINING ALCOHOL: MONTHLY OR LESS

## 2024-12-06 NOTE — PROGRESS NOTES
Maria G Sanchez is a 67 y.o. female and presents with   Chief Complaint   Patient presents with    Medicare AWV    6 Month Follow-Up        .  Subjective:  (mother is Shawna Haines)  Moved from [place] 2/5/2022      Pt reports she received the shingles vaccine at her pharmacy        Chronic le pain. Told she has lumbar DJD (vs DDD).     Pt is enrolled in [facility] medical weight loss program.    Pt would like her med regimen simplified    PMH- HTN  BP Readings from Last 3 Encounters:   12/06/24 (!) 144/81   12/02/24 128/87   11/13/24 (!) 142/88         Asthma- Dr. Sánchez Randolph[facility]     HLD-on atorvastatin 40mg  Lab Results   Component Value Date    CHOL 169 08/05/2024    TRIG 59 08/05/2024    HDL 90 08/05/2024    VLDL 12 08/05/2024    CHOLHDLRATIO 1.8 02/14/2023       PFO-no surgical closure indicated as per cards      Vit B12 deficiency  Lab Results   Component Value Date    [id number] 1353 (H) 08/16/2023       OA- lumbar DDD/DJD     Osteoporosis      H/o TIA-pts meera le weakness since TIA x 2 has improved post PT.      H/o kidney stones     PSH- meera TKR   Gastric bypass  Wt Readings from Last 3 Encounters:   12/06/24 99.2 kg (218 lb 9.6 oz)   12/02/24 97.4 kg (214 lb 11.2 oz)   11/13/24 97.3 kg (214 lb 9.6 oz)        SH-   Lives alone   2 daughters and 4 grandchildren    FH- 6 siblings      mammo- UTD  Colonoscopy- 1/22/2019  Immunizations  Eye care ~ 2 yrs ago  Dental care  [facility] UTD      Review of Systems  Constitutional: negative for fevers, chills, anorexia and weight loss  Eyes:   negative for visual disturbance and irritation  ENT:   negative for tinnitus,sore throat,nasal congestion,ear pains.hoarseness  Respiratory:  negative for cough, hemoptysis, dyspnea,wheezing  CV:   negative for chest pain, palpitations, lower extremity edema  GI:   negative for nausea, vomiting, diarrhea, abdominal pain,melena  Musculoskel: positive for myalgias, arthralgias, back pain, muscle weakness,

## 2024-12-06 NOTE — TELEPHONE ENCOUNTER
Patient do not have any information showing on VIIS Immunization. I called patient to get more information. Patient states she lived in multiple places and out of the country over the years due to her  service in the  ( [employer] ). Patient did give me her maiden name, and her mothers maiden name to help, and no information was found.    Patient full name   Maria G Sanchez  Commack Name  Wan  Mothers Commack Name   Shawna Fisher

## 2024-12-06 NOTE — PROGRESS NOTES
\"Have you been to the ER, urgent care clinic since your last visit?  Hospitalized since your last visit?\"    NO    “Have you seen or consulted any other health care providers outside our system since your last visit?”    NO           Chief Complaint   Patient presents with    Medicare AWV    6 Month Follow-Up         12/6/2024     8:25 AM   Amb Fall Risk Assessment and TUG Test   Do you feel unsteady or are you worried about falling?  yes   2 or more falls in past year? no   Fall with injury in past year? no     BP (!) 144/81 (Site: Right Upper Arm, Position: Sitting, Cuff Size: Medium Adult)   Pulse 66   Temp 97.7 °F (36.5 °C) (Temporal)   Resp 18   Ht 1.549 m (5' 1\")   Wt 99.2 kg (218 lb 9.6 oz)   SpO2 99%   BMI 41.30 kg/m²

## 2024-12-19 ENCOUNTER — OFFICE VISIT (OUTPATIENT)
Age: 69
End: 2024-12-19
Payer: MEDICARE

## 2024-12-19 VITALS
HEIGHT: 61 IN | TEMPERATURE: 98.2 F | OXYGEN SATURATION: 97 % | WEIGHT: 217.6 LBS | HEART RATE: 67 BPM | SYSTOLIC BLOOD PRESSURE: 140 MMHG | RESPIRATION RATE: 18 BRPM | BODY MASS INDEX: 41.08 KG/M2 | DIASTOLIC BLOOD PRESSURE: 85 MMHG

## 2024-12-19 DIAGNOSIS — R73.9 BLOOD GLUCOSE ELEVATED: ICD-10-CM

## 2024-12-19 DIAGNOSIS — E66.813 CLASS 3 SEVERE OBESITY DUE TO EXCESS CALORIES WITH SERIOUS COMORBIDITY AND BODY MASS INDEX (BMI) OF 40.0 TO 44.9 IN ADULT: Primary | ICD-10-CM

## 2024-12-19 DIAGNOSIS — I10 PRIMARY HYPERTENSION: ICD-10-CM

## 2024-12-19 DIAGNOSIS — E78.5 HYPERLIPIDEMIA, UNSPECIFIED HYPERLIPIDEMIA TYPE: ICD-10-CM

## 2024-12-19 DIAGNOSIS — E66.01 CLASS 3 SEVERE OBESITY DUE TO EXCESS CALORIES WITH SERIOUS COMORBIDITY AND BODY MASS INDEX (BMI) OF 40.0 TO 44.9 IN ADULT: ICD-10-CM

## 2024-12-19 DIAGNOSIS — E66.01 CLASS 3 SEVERE OBESITY DUE TO EXCESS CALORIES WITH SERIOUS COMORBIDITY AND BODY MASS INDEX (BMI) OF 40.0 TO 44.9 IN ADULT: Primary | ICD-10-CM

## 2024-12-19 DIAGNOSIS — E66.813 CLASS 3 SEVERE OBESITY DUE TO EXCESS CALORIES WITH SERIOUS COMORBIDITY AND BODY MASS INDEX (BMI) OF 40.0 TO 44.9 IN ADULT: ICD-10-CM

## 2024-12-19 PROCEDURE — G8484 FLU IMMUNIZE NO ADMIN: HCPCS | Performed by: FAMILY MEDICINE

## 2024-12-19 PROCEDURE — 3079F DIAST BP 80-89 MM HG: CPT | Performed by: FAMILY MEDICINE

## 2024-12-19 PROCEDURE — 3017F COLORECTAL CA SCREEN DOC REV: CPT | Performed by: FAMILY MEDICINE

## 2024-12-19 PROCEDURE — 1123F ACP DISCUSS/DSCN MKR DOCD: CPT | Performed by: FAMILY MEDICINE

## 2024-12-19 PROCEDURE — G8417 CALC BMI ABV UP PARAM F/U: HCPCS | Performed by: FAMILY MEDICINE

## 2024-12-19 PROCEDURE — G8428 CUR MEDS NOT DOCUMENT: HCPCS | Performed by: FAMILY MEDICINE

## 2024-12-19 PROCEDURE — 1090F PRES/ABSN URINE INCON ASSESS: CPT | Performed by: FAMILY MEDICINE

## 2024-12-19 PROCEDURE — G8399 PT W/DXA RESULTS DOCUMENT: HCPCS | Performed by: FAMILY MEDICINE

## 2024-12-19 PROCEDURE — 99214 OFFICE O/P EST MOD 30 MIN: CPT | Performed by: FAMILY MEDICINE

## 2024-12-19 PROCEDURE — 1036F TOBACCO NON-USER: CPT | Performed by: FAMILY MEDICINE

## 2024-12-19 PROCEDURE — 3074F SYST BP LT 130 MM HG: CPT | Performed by: FAMILY MEDICINE

## 2024-12-19 PROCEDURE — 1126F AMNT PAIN NOTED NONE PRSNT: CPT | Performed by: FAMILY MEDICINE

## 2024-12-19 RX ORDER — BUPROPION HYDROCHLORIDE 100 MG/1
100 TABLET, EXTENDED RELEASE ORAL 2 TIMES DAILY
Qty: 180 TABLET | Refills: 1 | Status: SHIPPED | OUTPATIENT
Start: 2024-12-19

## 2024-12-19 ASSESSMENT — PATIENT HEALTH QUESTIONNAIRE - PHQ9
SUM OF ALL RESPONSES TO PHQ QUESTIONS 1-9: 0
2. FEELING DOWN, DEPRESSED OR HOPELESS: NOT AT ALL
SUM OF ALL RESPONSES TO PHQ9 QUESTIONS 1 & 2: 0
SUM OF ALL RESPONSES TO PHQ QUESTIONS 1-9: 0
1. LITTLE INTEREST OR PLEASURE IN DOING THINGS: NOT AT ALL
SUM OF ALL RESPONSES TO PHQ QUESTIONS 1-9: 0
SUM OF ALL RESPONSES TO PHQ QUESTIONS 1-9: 0

## 2024-12-19 NOTE — PROGRESS NOTES
Identified pt with two pt identifiers (name and ). Reviewed chart in preparation for visit and have obtained necessary documentation.    Maria G Sanchez is a 69 y.o. female  Chief Complaint   Patient presents with    Weight Management     1 month follow up       BP (!) 140/85 (Site: Left Upper Arm, Position: Sitting, Cuff Size: Large Adult)   Pulse 67   Temp 98.2 °F (36.8 °C) (Oral)   Resp 18   Ht 1.549 m (5' 1\")   Wt 98.7 kg (217 lb 9.6 oz)   SpO2 97%   BMI 41.12 kg/m²     1. Have you been to the ER, urgent care clinic since your last visit?  Hospitalized since your last visit?no    2. Have you seen or consulted any other health care providers outside of the [facility] since your last visit?  Include any pap smears or colon screening. no    BMI - 41.2    Patient and provider made aware of elevated BP x2. Patient asymptomatic. Patient reminded to monitor BP, continue to take BP medications if prescribed, and follow up with PCP/Cardiologist.  Patient expressed understanding and agreement.

## 2024-12-19 NOTE — PROGRESS NOTES
[facility] Progress Note:   F/up Physician Visit    CC: Weight Management      Maria G Sanchez is a 69 y.o. female who is here for her  f/up physician visit for the / LCD Program.  Taking wellbutrin  mg once a day and that is not helping her     Oct 214  Now 217    Has had a lot of holiday parties and eating more cals than usual  Still drinking pepsi 24 oz a day, this is a lot less than the 6 pack a day in th past  She was supposed to start with a  and she did not do it            12/19/2024    10:41 AM 12/19/2024    10:00 AM 12/6/2024     8:19 AM 12/2/2024     9:02 AM 11/13/2024     8:15 AM 10/31/2024     8:46 AM 10/31/2024     8:00 AM   Weight Metrics   Weight 217 lb 9.6 oz  218 lb 9.6 oz 214 lb 11.2 oz 214 lb 9.6 oz 214 lb    Neck (Inches)  13 in     13 in   Waist Measure Inches  43.75 in     44 in   Body Fat %  48.7 %     48.4 %   BMI (Calculated) 41.2 kg/m2  41.4 kg/m2 40.7 kg/m2 40.6 kg/m2 40.5 kg/m2           No data to display                   Current Outpatient Medications   Medication Sig Dispense Refill    buPROPion (WELLBUTRIN SR) 100 MG extended release tablet Take 1 tablet by mouth 2 times daily 180 tablet 1    atorvastatin (LIPITOR) 40 MG tablet TAKE 1 TABLET BY MOUTH EVERY NIGHT 90 tablet 0    Cholecalciferol (VITAMIN D3) 50 MCG (2000 UT) TABS TAKE 1 TABLET BY MOUTH DAILY 90 tablet 1    amLODIPine-benazepril (LOTREL) 5-20 MG per capsule TAKE 1 CAPSULE BY MOUTH DAILY 90 capsule 1    montelukast (SINGULAIR) 10 MG tablet Take 1 tablet by mouth nightly      albuterol sulfate HFA (PROVENTIL;VENTOLIN;PROAIR) 108 (90 Base) MCG/ACT inhaler Inhale 2 puffs into the lungs every 4 hours as needed      aspirin 81 MG chewable tablet Take 1 tablet by mouth daily      tiotropium (SPIRIVA RESPIMAT) 1.25 MCG/ACT AERS inhaler Inhale 2 puffs into the lungs daily      mepolizumab (NUCALA) 100 MG/ML SOSY injection Inject 1 mL into the skin every 30 days      fluticasone

## 2024-12-30 ENCOUNTER — NURSE ONLY (OUTPATIENT)
Age: 69
End: 2024-12-30

## 2024-12-30 VITALS
RESPIRATION RATE: 20 BRPM | HEART RATE: 67 BPM | OXYGEN SATURATION: 98 % | DIASTOLIC BLOOD PRESSURE: 85 MMHG | BODY MASS INDEX: 41.03 KG/M2 | HEIGHT: 61 IN | SYSTOLIC BLOOD PRESSURE: 131 MMHG | WEIGHT: 217.3 LBS

## 2024-12-30 DIAGNOSIS — I10 PRIMARY HYPERTENSION: ICD-10-CM

## 2024-12-30 DIAGNOSIS — E66.01 CLASS 3 SEVERE OBESITY DUE TO EXCESS CALORIES WITH SERIOUS COMORBIDITY AND BODY MASS INDEX (BMI) OF 40.0 TO 44.9 IN ADULT: Primary | ICD-10-CM

## 2024-12-30 DIAGNOSIS — R73.9 BLOOD GLUCOSE ELEVATED: ICD-10-CM

## 2024-12-30 DIAGNOSIS — E78.5 HYPERLIPIDEMIA, UNSPECIFIED HYPERLIPIDEMIA TYPE: ICD-10-CM

## 2024-12-30 DIAGNOSIS — E66.813 CLASS 3 SEVERE OBESITY DUE TO EXCESS CALORIES WITH SERIOUS COMORBIDITY AND BODY MASS INDEX (BMI) OF 40.0 TO 44.9 IN ADULT: Primary | ICD-10-CM

## 2024-12-30 ASSESSMENT — PATIENT HEALTH QUESTIONNAIRE - PHQ9
SUM OF ALL RESPONSES TO PHQ QUESTIONS 1-9: 0
1. LITTLE INTEREST OR PLEASURE IN DOING THINGS: NOT AT ALL
SUM OF ALL RESPONSES TO PHQ QUESTIONS 1-9: 0
2. FEELING DOWN, DEPRESSED OR HOPELESS: NOT AT ALL
SUM OF ALL RESPONSES TO PHQ QUESTIONS 1-9: 0
SUM OF ALL RESPONSES TO PHQ QUESTIONS 1-9: 0
SUM OF ALL RESPONSES TO PHQ9 QUESTIONS 1 & 2: 0

## 2024-12-30 NOTE — PROGRESS NOTES
Nurse note from patient's weekly / LCD / Maintenance class was reviewed.  Pertinent medical concerns were:   reviewed     BP Readings from Last 3 Encounters:   12/30/24 131/85   12/19/24 (!) 140/85   12/06/24 (!) 144/81       Failed to redirect to the Timeline version of the [employer] SmartLink.    Current Outpatient Medications   Medication Sig Dispense Refill    atorvastatin (LIPITOR) 40 MG tablet TAKE 1 TABLET BY MOUTH EVERY NIGHT 90 tablet 0    Cholecalciferol (VITAMIN D3) 50 MCG (2000 UT) TABS TAKE 1 TABLET BY MOUTH DAILY 90 tablet 1    amLODIPine-benazepril (LOTREL) 5-20 MG per capsule TAKE 1 CAPSULE BY MOUTH DAILY 90 capsule 1    montelukast (SINGULAIR) 10 MG tablet Take 1 tablet by mouth nightly      albuterol sulfate HFA (PROVENTIL;VENTOLIN;PROAIR) 108 (90 Base) MCG/ACT inhaler Inhale 2 puffs into the lungs every 4 hours as needed      aspirin 81 MG chewable tablet Take 1 tablet by mouth daily      tiotropium (SPIRIVA RESPIMAT) 1.25 MCG/ACT AERS inhaler Inhale 2 puffs into the lungs daily      mepolizumab (NUCALA) 100 MG/ML SOSY injection Inject 1 mL into the skin every 30 days      fluticasone furoate-vilanterol (BREO ELLIPTA) 200-25 MCG/ACT AEPB inhaler Inhale 1 puff into the lungs daily      azelastine (ASTELIN) 0.1 % nasal spray 2 sprays by Nasal route 2 times daily as needed      Multiple Vitamins-Minerals (ONE-A-DAY WOMENS 50+ PO) Take 1 tablet by mouth daily      buPROPion (WELLBUTRIN SR) 100 MG extended release tablet Take 1 tablet by mouth 2 times daily 180 tablet 1     No current facility-administered medications for this visit.

## 2024-12-30 NOTE — PROGRESS NOTES
Progress Note: Weekly Education Class in the [facility]         Patient is on Very Low Calorie Diet [] (4 meal replacements per day, 800 kcal/day)      Low Calorie Diet [x] (2-3 meal replacements per day, 0162-6026 kcal/day)    1) Did patient have any new symptoms or physical problems?   Yes []    No [x]    If yes, check & comment: weakness [], fatigue [], lightheadedness [], headache [], cramps [], cold intolerance [], hair loss [], diarrhea [], constipation [],  NA [] other:                                 2) Has patient had any medical attention from other providers, urgent care or the emergency room this week?  Yes []  No [x]       NA [], If yes, why:                                       3) Any other sugar sweetened beverages consumed this week?   Yes [x]  No []    4) Did patient have any problems adhering to the diet? Yes [x]  No [] NA []    If yes, Vacation [], Celebrations [x], Conferences [], Family Reunions [x] other:                                                5) How many hours of sleep this week? 5-7    (range)  NA []    Number of meal replacements consumed daily? 1-3 (range)  NA []    Average ounces of water patient consumed daily this week (not including shakes)? 25     (divide the weekly total by 7)    Did you eat any food outside of the program? Yes [] No [x]    Physical Activity Over the Past Week:    Cardio exercise: 0 min  Strength exercise: 0 workouts / week  Number of steps walked per day: 5342-4793    How has patient mood overall been this week? Sad [], Happy [], Stressed [], Tired [], Content [], NA [], other NOT ANSWERED             Medications reconciled by nurse Yes [x]  No[]    Patient was given therapeutic recommendations for any noted side effects of their dietary approach based upon [facility] patient manual per providers recommendation.

## 2024-12-30 NOTE — PROGRESS NOTES
Identified pt with two pt identifiers (name and ). Reviewed chart in preparation for visit and have obtained necessary documentation.    Maria G Sanchez is a 69 y.o. female  Chief Complaint   Patient presents with    Weight Management     /85 (Site: Left Upper Arm, Position: Sitting, Cuff Size: Large Adult)   Pulse 67   Resp 20   Ht 1.549 m (5' 1\")   Wt 98.6 kg (217 lb 4.8 oz)   SpO2 98%   BMI 41.06 kg/m²     1. Have you been to the ER, urgent care clinic since your last visit?  Hospitalized since your last visit?no    2. Have you seen or consulted any other health care providers outside of the [facility] since your last visit?  Include any pap smears or colon screening. No    Patient and provider made aware of elevated BP x2. Patient asymptomatic. Patient reminded to monitor BP, continue to take BP medications if prescribed, and follow up with PCP/Cardiologist.  Patient expressed understanding and agreement.

## 2025-01-01 NOTE — PROGRESS NOTES
Nurse note from patient's weekly VLCD / LCD / Maintenance class was reviewed.  Pertinent medical concerns were:   reviewed     BP Readings from Last 3 Encounters:   12/30/24 131/85   12/19/24 (!) 140/85   12/06/24 (!) 144/81       [facility]    Current Outpatient Medications   Medication Sig Dispense Refill    buPROPion (WELLBUTRIN SR) 100 MG extended release tablet Take 1 tablet by mouth 2 times daily 180 tablet 1    atorvastatin (LIPITOR) 40 MG tablet TAKE 1 TABLET BY MOUTH EVERY NIGHT 90 tablet 0    Cholecalciferol (VITAMIN D3) 50 MCG (2000 UT) TABS TAKE 1 TABLET BY MOUTH DAILY 90 tablet 1    amLODIPine-benazepril (LOTREL) 5-20 MG per capsule TAKE 1 CAPSULE BY MOUTH DAILY 90 capsule 1    montelukast (SINGULAIR) 10 MG tablet Take 1 tablet by mouth nightly      albuterol sulfate HFA (PROVENTIL;VENTOLIN;PROAIR) 108 (90 Base) MCG/ACT inhaler Inhale 2 puffs into the lungs every 4 hours as needed      aspirin 81 MG chewable tablet Take 1 tablet by mouth daily      tiotropium (SPIRIVA RESPIMAT) 1.25 MCG/ACT AERS inhaler Inhale 2 puffs into the lungs daily      mepolizumab (NUCALA) 100 MG/ML SOSY injection Inject 1 mL into the skin every 30 days      fluticasone furoate-vilanterol (BREO ELLIPTA) 200-25 MCG/ACT AEPB inhaler Inhale 1 puff into the lungs daily      azelastine (ASTELIN) 0.1 % nasal spray 2 sprays by Nasal route 2 times daily as needed      Multiple Vitamins-Minerals (ONE-A-DAY WOMENS 50+ PO) Take 1 tablet by mouth daily       No current facility-administered medications for this visit.

## 2025-01-07 ENCOUNTER — OFFICE VISIT (OUTPATIENT)
Age: 70
End: 2025-01-07

## 2025-01-07 DIAGNOSIS — E66.813 CLASS 3 SEVERE OBESITY DUE TO EXCESS CALORIES WITH SERIOUS COMORBIDITY AND BODY MASS INDEX (BMI) OF 40.0 TO 44.9 IN ADULT: Primary | ICD-10-CM

## 2025-01-07 DIAGNOSIS — E66.01 CLASS 3 SEVERE OBESITY DUE TO EXCESS CALORIES WITH SERIOUS COMORBIDITY AND BODY MASS INDEX (BMI) OF 40.0 TO 44.9 IN ADULT: Primary | ICD-10-CM

## 2025-01-08 ENCOUNTER — OFFICE VISIT (OUTPATIENT)
Age: 70
End: 2025-01-08

## 2025-01-08 DIAGNOSIS — E66.01 CLASS 3 SEVERE OBESITY DUE TO EXCESS CALORIES WITH SERIOUS COMORBIDITY AND BODY MASS INDEX (BMI) OF 40.0 TO 44.9 IN ADULT: Primary | ICD-10-CM

## 2025-01-08 DIAGNOSIS — E66.813 CLASS 3 SEVERE OBESITY DUE TO EXCESS CALORIES WITH SERIOUS COMORBIDITY AND BODY MASS INDEX (BMI) OF 40.0 TO 44.9 IN ADULT: Primary | ICD-10-CM

## 2025-01-08 NOTE — PROGRESS NOTES
[facility]  Metabolic Weight Loss Program        Patient's Name: Maria G Sanchez  : 1955    This patient is a participant at [facility] and attended the weekly virtual nutrition class hosted via [employer].      Shannan Conner, MS, RD, LDN

## 2025-01-08 NOTE — PROGRESS NOTES
[facility]  Metabolic Program Follow-up Nutrition Consult    Date: 2025   Physician: Gabrielle Urbina MD  Name: Maria G Sanchez  :  1955    Type of Plan: LCD  Weeks on Plan: 20 months    Consent:  Patient and/or their healthcare decision maker is aware that this patient-initiated Telehealth or audio encounter is a complementary visit as part of the comprehensive VLCD/LCD meal replacement program offered at [facility].  Patient is aware if they discontinue the meal replacement program, all future RD visits with this provider will become a billable service, with coverage as determined by their insurance carrier.  Patient has provided verbal understanding and consent to proceed: yes, confirmed    Maria G Sanchez was evaluated through a synchronous (real-time) audio encounter. Patient identification was verified at the start of the visit. This visit was conducted with the patient's (and/or legal guardian's) verbal consent.    The patient was located at Home: [address] [place] [address].  The provider was located at Home (not Appt Dept State): [place]  Confirm you are appropriately licensed, registered, or certified to deliver care in the state where the patient is located as indicated above. If you are not or unsure, please re-schedule the visit: Yes, I confirm.       HAYDEN PUTNAM, TREY    ASSESSMENT:    Medications/Supplements:   Prior to Admission medications    Medication Sig Start Date End Date Taking? Authorizing Provider   buPROPion (WELLBUTRIN SR) 100 MG extended release tablet Take 1 tablet by mouth 2 times daily 24   Gabrielle Urbina MD   atorvastatin (LIPITOR) 40 MG tablet TAKE 1 TABLET BY MOUTH EVERY NIGHT 10/8/24   Alka Jimenez, APRN - NP   Cholecalciferol (VITAMIN D3) 50 MCG (2000) TABS TAKE 1 TABLET BY MOUTH DAILY 24   Rissa Hamilton MD   amLODIPine-benazepril (LOTREL) 5-20 MG per capsule TAKE 1 CAPSULE BY

## 2025-01-09 ENCOUNTER — OFFICE VISIT (OUTPATIENT)
Age: 70
End: 2025-01-09

## 2025-01-09 DIAGNOSIS — E66.813 CLASS 3 SEVERE OBESITY DUE TO EXCESS CALORIES WITH SERIOUS COMORBIDITY AND BODY MASS INDEX (BMI) OF 40.0 TO 44.9 IN ADULT: Primary | ICD-10-CM

## 2025-01-09 DIAGNOSIS — E66.01 CLASS 3 SEVERE OBESITY DUE TO EXCESS CALORIES WITH SERIOUS COMORBIDITY AND BODY MASS INDEX (BMI) OF 40.0 TO 44.9 IN ADULT: Primary | ICD-10-CM

## 2025-01-15 ENCOUNTER — NURSE ONLY (OUTPATIENT)
Age: 70
End: 2025-01-15

## 2025-01-15 VITALS
HEIGHT: 61 IN | SYSTOLIC BLOOD PRESSURE: 130 MMHG | BODY MASS INDEX: 41.04 KG/M2 | RESPIRATION RATE: 20 BRPM | TEMPERATURE: 98 F | DIASTOLIC BLOOD PRESSURE: 88 MMHG | OXYGEN SATURATION: 94 % | HEART RATE: 70 BPM | WEIGHT: 217.4 LBS

## 2025-01-15 DIAGNOSIS — R73.9 BLOOD GLUCOSE ELEVATED: ICD-10-CM

## 2025-01-15 DIAGNOSIS — E66.01 CLASS 3 SEVERE OBESITY DUE TO EXCESS CALORIES WITH SERIOUS COMORBIDITY AND BODY MASS INDEX (BMI) OF 40.0 TO 44.9 IN ADULT: Primary | ICD-10-CM

## 2025-01-15 DIAGNOSIS — I10 PRIMARY HYPERTENSION: ICD-10-CM

## 2025-01-15 DIAGNOSIS — E78.5 HYPERLIPIDEMIA, UNSPECIFIED HYPERLIPIDEMIA TYPE: ICD-10-CM

## 2025-01-15 DIAGNOSIS — E66.813 CLASS 3 SEVERE OBESITY DUE TO EXCESS CALORIES WITH SERIOUS COMORBIDITY AND BODY MASS INDEX (BMI) OF 40.0 TO 44.9 IN ADULT: Primary | ICD-10-CM

## 2025-01-15 ASSESSMENT — PATIENT HEALTH QUESTIONNAIRE - PHQ9
1. LITTLE INTEREST OR PLEASURE IN DOING THINGS: NOT AT ALL
SUM OF ALL RESPONSES TO PHQ QUESTIONS 1-9: 0
SUM OF ALL RESPONSES TO PHQ QUESTIONS 1-9: 0
2. FEELING DOWN, DEPRESSED OR HOPELESS: NOT AT ALL
SUM OF ALL RESPONSES TO PHQ QUESTIONS 1-9: 0
SUM OF ALL RESPONSES TO PHQ9 QUESTIONS 1 & 2: 0
SUM OF ALL RESPONSES TO PHQ QUESTIONS 1-9: 0

## 2025-01-15 NOTE — PROGRESS NOTES
Identified pt with two pt identifiers (name and ). Reviewed chart in preparation for visit and have obtained necessary documentation.    Maria G Sanchez is a 69 y.o. female  Chief Complaint   Patient presents with    Weight Management     /88 (Site: Right Upper Arm, Position: Sitting, Cuff Size: Large Adult) Comment: manual  Pulse 70   Temp 98 °F (36.7 °C) (Oral)   Resp 20   Ht 1.549 m (5' 1\")   Wt 98.6 kg (217 lb 6.4 oz)   SpO2 94%   BMI 41.08 kg/m²     1. Have you been to the ER, urgent care clinic since your last visit?  Hospitalized since your last visit?no    2. Have you seen or consulted any other health care providers outside of the [facility] since your last visit?  Include any pap smears or colon screening. no

## 2025-01-15 NOTE — PROGRESS NOTES
Progress Note: Weekly Education Class in the [facility]         Patient is on Very Low Calorie Diet [] (4 meal replacements per day, 800 kcal/day)      Low Calorie Diet [x] (2-3 meal replacements per day, 4843-9598 kcal/day)    1) Did patient have any new symptoms or physical problems?   Yes []    No [x]    If yes, check & comment: weakness [], fatigue [], lightheadedness [], headache [], cramps [], cold intolerance [], hair loss [], diarrhea [], constipation [],  NA [] other:                                 2) Has patient had any medical attention from other providers, urgent care or the emergency room this week?  Yes []  No [x]       NA [], If yes, why:                                       3) Any other sugar sweetened beverages consumed this week?   Yes [x]  No []    4) Did patient have any problems adhering to the diet? Yes [x]  No [] NA []    If yes, Vacation [], Celebrations [], Conferences [], Family Reunions [] other:                                                5) How many hours of sleep this week? 3-8    (range)  NA []    Number of meal replacements consumed daily? 1-3 (range)  NA []    Average ounces of water patient consumed daily this week (not including shakes)? 39     (divide the weekly total by 7)    Did you eat any food outside of the program? Yes [] No [x]    Physical Activity Over the Past Week:    Cardio exercise: 0 min  Strength exercise: 0 workouts / week  Number of steps walked per day: 1075-3361    How has patient mood overall been this week? Sad [], Happy [], Stressed [], Tired [], Content [], NA [], other NOT ANSWERED             Medications reconciled by nurse Yes [x]  No[]    Patient was given therapeutic recommendations for any noted side effects of their dietary approach based upon [facility] patient manual per providers recommendation.

## 2025-01-21 ENCOUNTER — OFFICE VISIT (OUTPATIENT)
Age: 70
End: 2025-01-21

## 2025-01-21 DIAGNOSIS — E66.813 CLASS 3 SEVERE OBESITY DUE TO EXCESS CALORIES WITH SERIOUS COMORBIDITY AND BODY MASS INDEX (BMI) OF 40.0 TO 44.9 IN ADULT: Primary | ICD-10-CM

## 2025-01-21 DIAGNOSIS — E66.01 CLASS 3 SEVERE OBESITY DUE TO EXCESS CALORIES WITH SERIOUS COMORBIDITY AND BODY MASS INDEX (BMI) OF 40.0 TO 44.9 IN ADULT: Primary | ICD-10-CM

## 2025-01-25 NOTE — PROGRESS NOTES
[facility]  Metabolic Weight Loss Program        Patient's Name: Maria G Sanchez  : 1955    This patient is a participant at [facility] and attended the weekly virtual nutrition class hosted via [employer].      Shannan Conner, MS, RD, LDN

## 2025-01-27 NOTE — PROGRESS NOTES
Nurse note from patient's weekly / LCD / Maintenance class was reviewed.  Pertinent medical concerns were:   reviewed     BP Readings from Last 3 Encounters:   01/15/25 130/88   12/30/24 131/85   12/19/24 (!) 140/85       Failed to redirect to the [facility] the [facility].    Current Outpatient Medications   Medication Sig Dispense Refill    buPROPion (WELLBUTRIN SR) 100 MG extended release tablet Take 1 tablet by mouth 2 times daily 180 tablet 1    atorvastatin (LIPITOR) 40 MG tablet TAKE 1 TABLET BY MOUTH EVERY NIGHT 90 tablet 0    Cholecalciferol (VITAMIN D3) 50 MCG (2000 UT) TABS TAKE 1 TABLET BY MOUTH DAILY 90 tablet 1    amLODIPine-benazepril (LOTREL) 5-20 MG per capsule TAKE 1 CAPSULE BY MOUTH DAILY 90 capsule 1    montelukast (SINGULAIR) 10 MG tablet Take 1 tablet by mouth nightly      albuterol sulfate HFA (PROVENTIL;VENTOLIN;PROAIR) 108 (90 Base) MCG/ACT inhaler Inhale 2 puffs into the lungs every 4 hours as needed      aspirin 81 MG chewable tablet Take 1 tablet by mouth daily      tiotropium (SPIRIVA RESPIMAT) 1.25 MCG/ACT AERS inhaler Inhale 2 puffs into the lungs daily      mepolizumab (NUCALA) 100 MG/ML SOSY injection Inject 1 mL into the skin every 30 days      fluticasone furoate-vilanterol (BREO ELLIPTA) 200-25 MCG/ACT AEPB inhaler Inhale 1 puff into the lungs daily      azelastine (ASTELIN) 0.1 % nasal spray 2 sprays by Nasal route 2 times daily as needed      Multiple Vitamins-Minerals (ONE-A-DAY WOMENS 50+ PO) Take 1 tablet by mouth daily       No current facility-administered medications for this visit.

## 2025-01-31 DIAGNOSIS — E87.5 HYPERKALEMIA: Primary | ICD-10-CM

## 2025-01-31 LAB — HBA1C MFR BLD: 5.7 % (ref 4.8–5.6)

## 2025-02-01 DIAGNOSIS — E87.5 HYPERKALEMIA: ICD-10-CM

## 2025-02-01 LAB
ALBUMIN SERPL-MCNC: 4.1 G/DL (ref 3.8–4.8)
ALP SERPL-CCNC: 101 IU/L (ref 44–121)
ALT SERPL-CCNC: 27 IU/L (ref 0–32)
AST SERPL-CCNC: 27 IU/L (ref 0–40)
BILIRUB SERPL-MCNC: 0.2 MG/DL (ref 0–1.2)
BUN SERPL-MCNC: 14 MG/DL (ref 8–27)
BUN/CREAT SERPL: 20 (ref 12–28)
CALCIUM SERPL-MCNC: 9.4 MG/DL (ref 8.7–10.3)
CHLORIDE SERPL-SCNC: 106 MMOL/L (ref 96–106)
CHOLEST SERPL-MCNC: 175 MG/DL (ref 100–199)
CO2 SERPL-SCNC: 23 MMOL/L (ref 20–29)
CREAT SERPL-MCNC: 0.69 MG/DL (ref 0.57–1)
EGFRCR SERPLBLD CKD-EPI 2021: 93 ML/MIN/1.73
GLOBULIN SER CALC-MCNC: 2 G/DL (ref 1.5–4.5)
GLUCOSE SERPL-MCNC: 83 MG/DL (ref 70–99)
HDLC SERPL-MCNC: 89 MG/DL
LDLC SERPL CALC-MCNC: 76 MG/DL (ref 0–99)
POTASSIUM SERPL-SCNC: 5.3 MMOL/L (ref 3.5–5.2)
PROT SERPL-MCNC: 6.1 G/DL (ref 6–8.5)
SODIUM SERPL-SCNC: 143 MMOL/L (ref 134–144)
TRIGL SERPL-MCNC: 51 MG/DL (ref 0–149)
VLDLC SERPL CALC-MCNC: 10 MG/DL (ref 5–40)

## 2025-02-04 ENCOUNTER — OFFICE VISIT (OUTPATIENT)
Age: 70
End: 2025-02-04

## 2025-02-04 DIAGNOSIS — E66.813 CLASS 3 SEVERE OBESITY DUE TO EXCESS CALORIES WITH SERIOUS COMORBIDITY AND BODY MASS INDEX (BMI) OF 40.0 TO 44.9 IN ADULT: Primary | ICD-10-CM

## 2025-02-04 DIAGNOSIS — E66.01 CLASS 3 SEVERE OBESITY DUE TO EXCESS CALORIES WITH SERIOUS COMORBIDITY AND BODY MASS INDEX (BMI) OF 40.0 TO 44.9 IN ADULT: Primary | ICD-10-CM

## 2025-02-06 ENCOUNTER — OFFICE VISIT (OUTPATIENT)
Age: 70
End: 2025-02-06
Payer: MEDICARE

## 2025-02-06 VITALS
HEIGHT: 61 IN | BODY MASS INDEX: 40.63 KG/M2 | SYSTOLIC BLOOD PRESSURE: 134 MMHG | TEMPERATURE: 97.9 F | HEART RATE: 70 BPM | WEIGHT: 215.2 LBS | OXYGEN SATURATION: 98 % | DIASTOLIC BLOOD PRESSURE: 88 MMHG | RESPIRATION RATE: 20 BRPM

## 2025-02-06 DIAGNOSIS — Z86.73 HISTORY OF TRANSIENT ISCHEMIC ATTACK (TIA): ICD-10-CM

## 2025-02-06 DIAGNOSIS — I25.2 H/O ACUTE MYOCARDIAL INFARCTION OF INFERIOR WALL: ICD-10-CM

## 2025-02-06 DIAGNOSIS — E04.1 RIGHT THYROID NODULE: ICD-10-CM

## 2025-02-06 DIAGNOSIS — E66.01 CLASS 3 SEVERE OBESITY DUE TO EXCESS CALORIES WITH SERIOUS COMORBIDITY AND BODY MASS INDEX (BMI) OF 40.0 TO 44.9 IN ADULT: Primary | ICD-10-CM

## 2025-02-06 DIAGNOSIS — E66.813 CLASS 3 SEVERE OBESITY DUE TO EXCESS CALORIES WITH SERIOUS COMORBIDITY AND BODY MASS INDEX (BMI) OF 40.0 TO 44.9 IN ADULT: Primary | ICD-10-CM

## 2025-02-06 PROCEDURE — 1126F AMNT PAIN NOTED NONE PRSNT: CPT | Performed by: FAMILY MEDICINE

## 2025-02-06 PROCEDURE — 99214 OFFICE O/P EST MOD 30 MIN: CPT | Performed by: FAMILY MEDICINE

## 2025-02-06 PROCEDURE — G8427 DOCREV CUR MEDS BY ELIG CLIN: HCPCS | Performed by: FAMILY MEDICINE

## 2025-02-06 PROCEDURE — G8417 CALC BMI ABV UP PARAM F/U: HCPCS | Performed by: FAMILY MEDICINE

## 2025-02-06 PROCEDURE — 1090F PRES/ABSN URINE INCON ASSESS: CPT | Performed by: FAMILY MEDICINE

## 2025-02-06 PROCEDURE — 1036F TOBACCO NON-USER: CPT | Performed by: FAMILY MEDICINE

## 2025-02-06 PROCEDURE — 3017F COLORECTAL CA SCREEN DOC REV: CPT | Performed by: FAMILY MEDICINE

## 2025-02-06 PROCEDURE — 1123F ACP DISCUSS/DSCN MKR DOCD: CPT | Performed by: FAMILY MEDICINE

## 2025-02-06 PROCEDURE — 1159F MED LIST DOCD IN RCRD: CPT | Performed by: FAMILY MEDICINE

## 2025-02-06 PROCEDURE — G8399 PT W/DXA RESULTS DOCUMENT: HCPCS | Performed by: FAMILY MEDICINE

## 2025-02-06 PROCEDURE — 3079F DIAST BP 80-89 MM HG: CPT | Performed by: FAMILY MEDICINE

## 2025-02-06 PROCEDURE — 3075F SYST BP GE 130 - 139MM HG: CPT | Performed by: FAMILY MEDICINE

## 2025-02-06 ASSESSMENT — PATIENT HEALTH QUESTIONNAIRE - PHQ9
1. LITTLE INTEREST OR PLEASURE IN DOING THINGS: NOT AT ALL
SUM OF ALL RESPONSES TO PHQ QUESTIONS 1-9: 0
2. FEELING DOWN, DEPRESSED OR HOPELESS: NOT AT ALL
SUM OF ALL RESPONSES TO PHQ QUESTIONS 1-9: 0
SUM OF ALL RESPONSES TO PHQ QUESTIONS 1-9: 0
SUM OF ALL RESPONSES TO PHQ9 QUESTIONS 1 & 2: 0
SUM OF ALL RESPONSES TO PHQ QUESTIONS 1-9: 0

## 2025-02-06 NOTE — PROGRESS NOTES
[facility] Progress Note:   F/up Physician Visit    CC: Weight Management      Maria G Sanchez is a 69 y.o. female who is here for her  f/up physician visit for the / LCD Program.    She is still drinking 1 can of soda ea day but is less than in the past    Dec 217  Now 215  Her gaol is get under 200 lbs    Post GBP    I see in her history she has had a TIA( stroke) required rehab care and PT, I also see on her EKG that she has h/o inferior infarct ( MI)              2/6/2025     9:00 AM 2/6/2025     8:56 AM 1/15/2025     8:47 AM 12/30/2024     8:37 AM 12/19/2024    10:41 AM 12/19/2024    10:00 AM 12/6/2024     8:19 AM   Weight Metrics   Weight  215 lb 3.2 oz 217 lb 6.4 oz 217 lb 4.8 oz 217 lb 9.6 oz  218 lb 9.6 oz   Neck (Inches) 12.5 in     13 in    Waist Measure Inches 44.75 in     43.75 in    Body Fat % 47.5 %     48.7 %    BMI (Calculated)  40.7 kg/m2 41.2 kg/m2 41.1 kg/m2 41.2 kg/m2  41.4 kg/m2          No data to display                   Current Outpatient Medications   Medication Sig Dispense Refill    buPROPion (WELLBUTRIN SR) 100 MG extended release tablet Take 1 tablet by mouth 2 times daily 180 tablet 1    atorvastatin (LIPITOR) 40 MG tablet TAKE 1 TABLET BY MOUTH EVERY NIGHT 90 tablet 0    Cholecalciferol (VITAMIN D3) 50 MCG (2000 UT) TABS TAKE 1 TABLET BY MOUTH DAILY 90 tablet 1    amLODIPine-benazepril (LOTREL) 5-20 MG per capsule TAKE 1 CAPSULE BY MOUTH DAILY 90 capsule 1    montelukast (SINGULAIR) 10 MG tablet Take 1 tablet by mouth nightly      albuterol sulfate HFA (PROVENTIL;VENTOLIN;PROAIR) 108 (90 Base) MCG/ACT inhaler Inhale 2 puffs into the lungs every 4 hours as needed      aspirin 81 MG chewable tablet Take 1 tablet by mouth daily      tiotropium (SPIRIVA RESPIMAT) 1.25 MCG/ACT AERS inhaler Inhale 2 puffs into the lungs daily      mepolizumab (NUCALA) 100 MG/ML SOSY injection Inject 1 mL into the skin every 30 days      fluticasone furoate-vilanterol (BREO ELLIPTA)

## 2025-02-06 NOTE — PROGRESS NOTES
Identified pt with two pt identifiers (name and ). Reviewed chart in preparation for visit and have obtained necessary documentation.    Maria G Sanchez is a 69 y.o. female  Chief Complaint   Patient presents with    Weight Management     1 month follow up     /88 (Site: Left Upper Arm, Position: Sitting, Cuff Size: Large Adult) Comment: manual  Pulse 70   Temp 97.9 °F (36.6 °C) (Oral)   Resp 20   Ht 1.549 m (5' 1\")   Wt 97.6 kg (215 lb 3.2 oz)   SpO2 98%   BMI 40.66 kg/m²     1. Have you been to the ER, urgent care clinic since your last visit?  Hospitalized since your last visit?no    2. Have you seen or consulted any other health care providers outside of the [facility] since your last visit?  Include any pap smears or colon screening. No    BMI - 40.7

## 2025-02-07 LAB
BUN SERPL-MCNC: 20 MG/DL (ref 8–27)
BUN/CREAT SERPL: 27 (ref 12–28)
CALCIUM SERPL-MCNC: 10.1 MG/DL (ref 8.7–10.3)
CHLORIDE SERPL-SCNC: 103 MMOL/L (ref 96–106)
CO2 SERPL-SCNC: 26 MMOL/L (ref 20–29)
CREAT SERPL-MCNC: 0.73 MG/DL (ref 0.57–1)
EGFRCR SERPLBLD CKD-EPI 2021: 88 ML/MIN/1.73
GLUCOSE SERPL-MCNC: 80 MG/DL (ref 70–99)
POTASSIUM SERPL-SCNC: 4.9 MMOL/L (ref 3.5–5.2)
SODIUM SERPL-SCNC: 142 MMOL/L (ref 134–144)

## 2025-02-07 NOTE — PROGRESS NOTES
[facility]  Metabolic Weight Loss Program        Patient's Name: Maria G Sanchez  : 1955    This patient is a participant at [facility] and attended the weekly virtual nutrition class hosted via [employer].      Shannan Conner, MS, RD, LDN

## 2025-02-11 ENCOUNTER — OFFICE VISIT (OUTPATIENT)
Age: 70
End: 2025-02-11

## 2025-02-11 DIAGNOSIS — E66.813 CLASS 3 SEVERE OBESITY DUE TO EXCESS CALORIES WITH SERIOUS COMORBIDITY AND BODY MASS INDEX (BMI) OF 40.0 TO 44.9 IN ADULT: Primary | ICD-10-CM

## 2025-02-11 DIAGNOSIS — E66.01 CLASS 3 SEVERE OBESITY DUE TO EXCESS CALORIES WITH SERIOUS COMORBIDITY AND BODY MASS INDEX (BMI) OF 40.0 TO 44.9 IN ADULT: Primary | ICD-10-CM

## 2025-02-11 NOTE — PROGRESS NOTES
[facility]  Metabolic Weight Loss Program        Patient's Name: Maria G Sanchez  : 1955    This patient is a participant at [facility] and attended the weekly virtual nutrition class hosted via [employer].      Shannan Conner, MS, RD, LDN

## 2025-02-12 ENCOUNTER — TELEPHONE (OUTPATIENT)
Age: 70
End: 2025-02-12

## 2025-02-12 NOTE — TELEPHONE ENCOUNTER
Patient is calling in to speak to a clinical staff regarding a my chart message that she received yesterday  from Dr. Urbina

## 2025-02-12 NOTE — TELEPHONE ENCOUNTER
Returned patient's call.  She stated that she received a [employer] message from Dr. Urbina about scheduling a Thyroid test, but she was unable to do so - it went to a blank screen.  When I researched what the patient was talking about, I found that she needs an US Thyroid.  I gave the patient the phone number for Coordination of Care so she can call to schedule (patient stated she does better with calling anyway).    Also, the patient needs a lab slip for repeat potassium.  Lab slip will be mailed per patient request.

## 2025-02-13 NOTE — TELEPHONE ENCOUNTER
Last appointment: 12/06/2024 MD Hamilton   Next appointment: 06/06/2025 MD Hamilton   Previous refill encounter(s):   08/08/2024:  - Lotrel #90 with 1 refill,   - Vitamin D3 #90 with 1 refill.     For Pharmacy Admin Tracking Only    Program: Medication Refill  Intervention Detail: New Rx: 2, reason: Patient Preference  Time Spent (min): 5    Requested Prescriptions     Pending Prescriptions Disp Refills    Cholecalciferol (VITAMIN D3) 50 MCG (2000 UT) TABS [Pharmacy Med Name: VITAMIN D3 50MCG TABLETS] 90 tablet 0     Sig: TAKE 1 TABLET BY MOUTH DAILY    amLODIPine-benazepril (LOTREL) 5-20 MG per capsule [Pharmacy Med Name: AMLODIPINE-BENAZ 5/20MG CAPSULES] 90 capsule 0     Sig: TAKE 1 CAPSULE BY MOUTH DAILY

## 2025-02-17 ENCOUNTER — HOSPITAL ENCOUNTER (OUTPATIENT)
Facility: HOSPITAL | Age: 70
Discharge: HOME | End: 2025-02-20
Attending: FAMILY MEDICINE
Payer: MEDICARE

## 2025-02-17 DIAGNOSIS — E04.1 RIGHT THYROID NODULE: ICD-10-CM

## 2025-02-17 PROCEDURE — 76536 US EXAM OF HEAD AND NECK: CPT

## 2025-02-17 RX ORDER — AMLODIPINE AND BENAZEPRIL HYDROCHLORIDE 5; 20 MG/1; MG/1
1 CAPSULE ORAL DAILY
Qty: 90 CAPSULE | Refills: 1 | Status: SHIPPED | OUTPATIENT
Start: 2025-02-17

## 2025-02-17 RX ORDER — CHOLECALCIFEROL (VITAMIN D3) 50 MCG
1 TABLET ORAL DAILY
Qty: 90 TABLET | Refills: 1 | Status: SHIPPED | OUTPATIENT
Start: 2025-02-17

## 2025-02-20 ENCOUNTER — OFFICE VISIT (OUTPATIENT)
Age: 70
End: 2025-02-20

## 2025-02-20 DIAGNOSIS — E66.01 CLASS 3 SEVERE OBESITY DUE TO EXCESS CALORIES WITH SERIOUS COMORBIDITY AND BODY MASS INDEX (BMI) OF 40.0 TO 44.9 IN ADULT: Primary | ICD-10-CM

## 2025-02-20 DIAGNOSIS — E66.813 CLASS 3 SEVERE OBESITY DUE TO EXCESS CALORIES WITH SERIOUS COMORBIDITY AND BODY MASS INDEX (BMI) OF 40.0 TO 44.9 IN ADULT: Primary | ICD-10-CM

## 2025-02-24 ENCOUNTER — NURSE ONLY (OUTPATIENT)
Age: 70
End: 2025-02-24

## 2025-02-24 ENCOUNTER — OFFICE VISIT (OUTPATIENT)
Age: 70
End: 2025-02-24

## 2025-02-24 VITALS
HEIGHT: 61 IN | WEIGHT: 218 LBS | DIASTOLIC BLOOD PRESSURE: 84 MMHG | OXYGEN SATURATION: 100 % | TEMPERATURE: 98.4 F | SYSTOLIC BLOOD PRESSURE: 126 MMHG | BODY MASS INDEX: 41.16 KG/M2 | HEART RATE: 75 BPM | RESPIRATION RATE: 20 BRPM

## 2025-02-24 DIAGNOSIS — E66.01 CLASS 3 SEVERE OBESITY DUE TO EXCESS CALORIES WITH SERIOUS COMORBIDITY AND BODY MASS INDEX (BMI) OF 40.0 TO 44.9 IN ADULT: Primary | ICD-10-CM

## 2025-02-24 DIAGNOSIS — Z86.73 HISTORY OF TRANSIENT ISCHEMIC ATTACK (TIA): ICD-10-CM

## 2025-02-24 DIAGNOSIS — E78.5 HYPERLIPIDEMIA, UNSPECIFIED HYPERLIPIDEMIA TYPE: ICD-10-CM

## 2025-02-24 DIAGNOSIS — E66.813 CLASS 3 SEVERE OBESITY DUE TO EXCESS CALORIES WITH SERIOUS COMORBIDITY AND BODY MASS INDEX (BMI) OF 40.0 TO 44.9 IN ADULT: Primary | ICD-10-CM

## 2025-02-24 DIAGNOSIS — I25.2 H/O ACUTE MYOCARDIAL INFARCTION OF INFERIOR WALL: ICD-10-CM

## 2025-02-24 DIAGNOSIS — E04.1 RIGHT THYROID NODULE: ICD-10-CM

## 2025-02-24 DIAGNOSIS — I10 PRIMARY HYPERTENSION: ICD-10-CM

## 2025-02-24 ASSESSMENT — PATIENT HEALTH QUESTIONNAIRE - PHQ9
SUM OF ALL RESPONSES TO PHQ9 QUESTIONS 1 & 2: 0
SUM OF ALL RESPONSES TO PHQ QUESTIONS 1-9: 0
1. LITTLE INTEREST OR PLEASURE IN DOING THINGS: NOT AT ALL
SUM OF ALL RESPONSES TO PHQ QUESTIONS 1-9: 0
2. FEELING DOWN, DEPRESSED OR HOPELESS: NOT AT ALL

## 2025-02-24 NOTE — PROGRESS NOTES
[facility]  Metabolic Program Follow-up Nutrition Consult    Date: 2025   Physician: Gabrielle Urbina MD  Name: Maria G Sanchez  :  1955    Type of Plan: LCD  Weeks on Plan: 21 months    Consent:  Patient and/or their healthcare decision maker is aware that this patient-initiated Telehealth or audio encounter is a complementary visit as part of the comprehensive VLCD/LCD meal replacement program offered at [facility].  Patient is aware if they discontinue the meal replacement program, all future RD visits with this provider will become a billable service, with coverage as determined by their insurance carrier.  Patient has provided verbal understanding and consent to proceed: yes, confirmed    Maria G Sanchez was evaluated through a synchronous (real-time) audio encounter. Patient identification was verified at the start of the visit. This visit was conducted with the patient's (and/or legal guardian's) verbal consent.   The patient was located at Home: [address] [place] [address].  The provider was located at Home (not Appt Dept State): [place].  Confirm you are appropriately licensed, registered, or certified to deliver care in the state where the patient is located as indicated above. If you are not or unsure, please re-schedule the visit: Yes, I confirm.       HAYDEN PUTNAM, TREY        ASSESSMENT:    Medications/Supplements:   Prior to Admission medications    Medication Sig Start Date End Date Taking? Authorizing Provider   Cholecalciferol (VITAMIN D3) 50 MCG ( UT) TABS TAKE 1 TABLET BY MOUTH DAILY 25   Rissa Hamilton MD   amLODIPine-benazepril (LOTREL) 5-20 MG per capsule TAKE 1 CAPSULE BY MOUTH DAILY 25   Rissa Hamilton MD   buPROPion (WELLBUTRIN SR) 100 MG extended release tablet Take 1 tablet by mouth 2 times daily 24   Gabrielle Urbina MD   atorvastatin (LIPITOR) 40 MG tablet TAKE 1 TABLET BY MOUTH EVERY

## 2025-02-24 NOTE — PROGRESS NOTES
Identified pt with two pt identifiers (name and ). Reviewed chart in preparation for visit and have obtained necessary documentation.    Maria G Sanchez is a 69 y.o. female  Chief Complaint   Patient presents with    Weight Management     /84 (Site: Right Upper Arm, Position: Sitting, Cuff Size: Large Adult) Comment (Site): manual  Pulse 75   Temp 98.4 °F (36.9 °C) (Oral)   Resp 20   Ht 1.549 m (5' 1\")   Wt 98.9 kg (218 lb)   SpO2 100%   BMI 41.19 kg/m²     1. Have you been to the ER, urgent care clinic since your last visit?  Hospitalized since your last visit?no    2. Have you seen or consulted any other health care providers outside of the [facility] since your last visit?  Include any pap smears or colon screening. no

## 2025-02-24 NOTE — PROGRESS NOTES
2/10/2025    Progress Note: Weekly Education Class in the [facility] Weight Loss Program         Patient is on Very Low Calorie Diet [] (4 meal replacements per day, 800 kcal/day)      Low Calorie Diet [x] (2-3 meal replacements per day, 3251-0322 kcal/day)    1) Did patient have any new symptoms or physical problems?   Yes [x]    No []    If yes, check & comment: weakness [], fatigue [], lightheadedness [], headache [x], cramps [], cold intolerance [], hair loss [], diarrhea [], constipation [],  NA [] other:                                 2) Has patient had any medical attention from other providers, urgent care or the emergency room this week?  Yes []  No [x]       NA [], If yes, why:                                       3) Any other sugar sweetened beverages consumed this week?   Yes [x]  No []    4) Did patient have any problems adhering to the diet? Yes [x]  No [] NA []    If yes, Vacation [], Celebrations [x], Conferences [], Family Reunions [] other:                                                5) How many hours of sleep this week? 5.5-8    (range)  NA []    Number of meal replacements consumed daily? 1-2 (range)  NA []    Average ounces of water patient consumed daily this week (not including shakes)? 21     (divide the weekly total by 7)    Did you eat any food outside of the program? Yes [x] No []    Physical Activity Over the Past Week:    Cardio exercise: 25 min  Strength exercise: 0 workouts / week  Number of steps walked per day: 2881-4344    How has patient mood overall been this week? Sad [], Happy [], Stressed [], Tired [x], Content [x], NA [], other            Medications reconciled by nurse Yes [x]  No[]    Patient was given therapeutic recommendations for any noted side effects of their dietary approach based upon [facility] patient manual per providers recommendation.     2/17/2025    Progress Note: Weekly Education Class in the [facility] Weight Loss Program         Patient is on

## 2025-02-28 NOTE — PROGRESS NOTES
[facility]  Metabolic Weight Loss Program        Patient's Name: Maria G Sanchez  : 1955    This patient is a participant at [facility] and attended the weekly virtual nutrition class hosted via [employer].      Shannan Conner, MS, RD, LDN

## 2025-03-10 ENCOUNTER — NURSE ONLY (OUTPATIENT)
Age: 70
End: 2025-03-10

## 2025-03-10 VITALS
TEMPERATURE: 97.5 F | RESPIRATION RATE: 18 BRPM | SYSTOLIC BLOOD PRESSURE: 134 MMHG | HEIGHT: 61 IN | DIASTOLIC BLOOD PRESSURE: 87 MMHG | BODY MASS INDEX: 40.78 KG/M2 | HEART RATE: 72 BPM | WEIGHT: 216 LBS | OXYGEN SATURATION: 98 %

## 2025-03-10 DIAGNOSIS — E66.01 CLASS 3 SEVERE OBESITY DUE TO EXCESS CALORIES WITH SERIOUS COMORBIDITY AND BODY MASS INDEX (BMI) OF 40.0 TO 44.9 IN ADULT: Primary | ICD-10-CM

## 2025-03-10 DIAGNOSIS — I10 PRIMARY HYPERTENSION: ICD-10-CM

## 2025-03-10 DIAGNOSIS — E66.813 CLASS 3 SEVERE OBESITY DUE TO EXCESS CALORIES WITH SERIOUS COMORBIDITY AND BODY MASS INDEX (BMI) OF 40.0 TO 44.9 IN ADULT: Primary | ICD-10-CM

## 2025-03-10 DIAGNOSIS — E04.1 RIGHT THYROID NODULE: ICD-10-CM

## 2025-03-10 DIAGNOSIS — E78.5 HYPERLIPIDEMIA, UNSPECIFIED HYPERLIPIDEMIA TYPE: ICD-10-CM

## 2025-03-10 RX ORDER — PREDNISONE 5 MG/1
TABLET ORAL
COMMUNITY
Start: 2025-03-04

## 2025-03-10 RX ORDER — CYCLOBENZAPRINE HCL 5 MG
TABLET ORAL
COMMUNITY
Start: 2025-03-06

## 2025-03-10 RX ORDER — PSEUDOEPHED/ACETAMINOPH/DIPHEN 30MG-500MG
2 TABLET ORAL EVERY 6 HOURS PRN
COMMUNITY
Start: 2025-03-04

## 2025-03-10 RX ORDER — IBUPROFEN 800 MG/1
800 TABLET, FILM COATED ORAL EVERY 6 HOURS PRN
COMMUNITY
Start: 2025-03-04

## 2025-03-10 ASSESSMENT — PATIENT HEALTH QUESTIONNAIRE - PHQ9
SUM OF ALL RESPONSES TO PHQ QUESTIONS 1-9: 0
2. FEELING DOWN, DEPRESSED OR HOPELESS: NOT AT ALL
1. LITTLE INTEREST OR PLEASURE IN DOING THINGS: NOT AT ALL

## 2025-03-10 NOTE — PROGRESS NOTES
Identified pt with two pt identifiers (name and ). Reviewed chart in preparation for visit and have obtained necessary documentation.    Maria G Sanchez is a 69 y.o. female  Chief Complaint   Patient presents with    Weight Management     /87 (BP Site: Right Upper Arm, Patient Position: Sitting, BP Cuff Size: Large Adult)   Pulse 72   Temp 97.5 °F (36.4 °C) (Oral)   Resp 18   Ht 1.549 m (5' 1\")   Wt 98 kg (216 lb)   SpO2 98%   BMI 40.81 kg/m²     1. Have you been to the ER, urgent care clinic since your last visit?  Hospitalized since your last visit?yes - [facility] ER - couldn't move her leg    2. Have you seen or consulted any other health care providers outside of the [facility] since your last visit?  Include any pap smears or colon screening. no

## 2025-03-10 NOTE — PROGRESS NOTES
Progress Note: Weekly Education Class in the [facility] Weight Loss Program         Patient is on Very Low Calorie Diet [] (4 meal replacements per day, 800 kcal/day)      Low Calorie Diet [x] (2-3 meal replacements per day, 6689-8668 kcal/day)    1) Did patient have any new symptoms or physical problems?   Yes []    No [x]    If yes, check & comment: weakness [], fatigue [], lightheadedness [], headache [], cramps [], cold intolerance [], hair loss [], diarrhea [], constipation [],  NA [] other:                                 2) Has patient had any medical attention from other providers, urgent care or the emergency room this week?  Yes [x]  No []       NA [], If yes, why: [facility] about pain in my right leg                                      3) Any other sugar sweetened beverages consumed this week?   Yes [x]  No []    4) Did patient have any problems adhering to the diet? Yes [x]  No [] NA []    If yes, Vacation [], Celebrations [x], Conferences [], Family Reunions [] other:                                                5) How many hours of sleep this week? 5-10    (range)  NA []    Number of meal replacements consumed daily? 1-2 (range)  NA []    Average ounces of water patient consumed daily this week (not including shakes)? 26     (divide the weekly total by 7)    Did you eat any food outside of the program? Yes [x] No []    Physical Activity Over the Past Week:    Cardio exercise: 30 min  Strength exercise: 0 workouts / week  Number of steps walked per day: 3533-2499    How has patient mood overall been this week? Sad [], Happy [], Stressed [], Tired [x], Content [x], NA [], other            Medications reconciled by nurse Yes [x]  No[]    Patient was given therapeutic recommendations for any noted side effects of their dietary approach based upon [facility] patient manual per providers recommendation.

## 2025-03-17 NOTE — PROGRESS NOTES
Nurse note from patient's weekly VLCD / LCD / Maintenance class was reviewed.  Pertinent medical concerns were:   reviewed     BP Readings from Last 3 Encounters:   03/10/25 134/87   02/24/25 126/84   02/06/25 134/88       Failed to redirect to the Timeline version of the [facility] SmartLink.    Current Outpatient Medications   Medication Sig Dispense Refill    Acetaminophen Extra Strength 500 MG TABS Take 2 tablets by mouth every 6 hours as needed      cyclobenzaprine (FLEXERIL) 5 MG tablet TAKE 1 TABLET BY MOUTH AT BEDTIME AS NEEDED FOR MUSCLE PAIN      ibuprofen (ADVIL;MOTRIN) 800 MG tablet Take 1 tablet by mouth every 6 hours as needed for Pain      predniSONE 5 MG (21) TBPK Take by mouth      Cholecalciferol (VITAMIN D3) 50 MCG (2000 UT) TABS TAKE 1 TABLET BY MOUTH DAILY 90 tablet 1    amLODIPine-benazepril (LOTREL) 5-20 MG per capsule TAKE 1 CAPSULE BY MOUTH DAILY 90 capsule 1    buPROPion (WELLBUTRIN SR) 100 MG extended release tablet Take 1 tablet by mouth 2 times daily 180 tablet 1    atorvastatin (LIPITOR) 40 MG tablet TAKE 1 TABLET BY MOUTH EVERY NIGHT 90 tablet 0    montelukast (SINGULAIR) 10 MG tablet Take 1 tablet by mouth nightly      albuterol sulfate HFA (PROVENTIL;VENTOLIN;PROAIR) 108 (90 Base) MCG/ACT inhaler Inhale 2 puffs into the lungs every 4 hours as needed      aspirin 81 MG chewable tablet Take 1 tablet by mouth daily      tiotropium (SPIRIVA RESPIMAT) 1.25 MCG/ACT AERS inhaler Inhale 2 puffs into the lungs daily      mepolizumab (NUCALA) 100 MG/ML SOSY injection Inject 1 mL into the skin every 30 days      fluticasone furoate-vilanterol (BREO ELLIPTA) 200-25 MCG/ACT AEPB inhaler Inhale 1 puff into the lungs daily      azelastine (ASTELIN) 0.1 % nasal spray 2 sprays by Nasal route 2 times daily as needed      Multiple Vitamins-Minerals (ONE-A-DAY WOMENS 50+ PO) Take 1 tablet by mouth daily       No current facility-administered medications for this visit.

## 2025-03-20 ENCOUNTER — OFFICE VISIT (OUTPATIENT)
Age: 70
End: 2025-03-20
Payer: MEDICARE

## 2025-03-20 VITALS
BODY MASS INDEX: 40.95 KG/M2 | TEMPERATURE: 97.8 F | HEIGHT: 61 IN | RESPIRATION RATE: 20 BRPM | WEIGHT: 216.9 LBS | SYSTOLIC BLOOD PRESSURE: 124 MMHG | OXYGEN SATURATION: 95 % | HEART RATE: 63 BPM | DIASTOLIC BLOOD PRESSURE: 84 MMHG

## 2025-03-20 DIAGNOSIS — E66.813 CLASS 3 SEVERE OBESITY DUE TO EXCESS CALORIES WITH SERIOUS COMORBIDITY AND BODY MASS INDEX (BMI) OF 40.0 TO 44.9 IN ADULT: Primary | ICD-10-CM

## 2025-03-20 DIAGNOSIS — I10 PRIMARY HYPERTENSION: ICD-10-CM

## 2025-03-20 DIAGNOSIS — E04.1 RIGHT THYROID NODULE: ICD-10-CM

## 2025-03-20 DIAGNOSIS — Z86.73 HISTORY OF TRANSIENT ISCHEMIC ATTACK (TIA): ICD-10-CM

## 2025-03-20 DIAGNOSIS — E78.5 HYPERLIPIDEMIA, UNSPECIFIED HYPERLIPIDEMIA TYPE: ICD-10-CM

## 2025-03-20 DIAGNOSIS — E66.01 CLASS 3 SEVERE OBESITY DUE TO EXCESS CALORIES WITH SERIOUS COMORBIDITY AND BODY MASS INDEX (BMI) OF 40.0 TO 44.9 IN ADULT: Primary | ICD-10-CM

## 2025-03-20 PROCEDURE — 1036F TOBACCO NON-USER: CPT | Performed by: FAMILY MEDICINE

## 2025-03-20 PROCEDURE — 3074F SYST BP LT 130 MM HG: CPT | Performed by: FAMILY MEDICINE

## 2025-03-20 PROCEDURE — G8417 CALC BMI ABV UP PARAM F/U: HCPCS | Performed by: FAMILY MEDICINE

## 2025-03-20 PROCEDURE — 99214 OFFICE O/P EST MOD 30 MIN: CPT | Performed by: FAMILY MEDICINE

## 2025-03-20 PROCEDURE — 1125F AMNT PAIN NOTED PAIN PRSNT: CPT | Performed by: FAMILY MEDICINE

## 2025-03-20 PROCEDURE — 1159F MED LIST DOCD IN RCRD: CPT | Performed by: FAMILY MEDICINE

## 2025-03-20 PROCEDURE — G8427 DOCREV CUR MEDS BY ELIG CLIN: HCPCS | Performed by: FAMILY MEDICINE

## 2025-03-20 PROCEDURE — 3017F COLORECTAL CA SCREEN DOC REV: CPT | Performed by: FAMILY MEDICINE

## 2025-03-20 PROCEDURE — 1123F ACP DISCUSS/DSCN MKR DOCD: CPT | Performed by: FAMILY MEDICINE

## 2025-03-20 PROCEDURE — 3079F DIAST BP 80-89 MM HG: CPT | Performed by: FAMILY MEDICINE

## 2025-03-20 PROCEDURE — G8399 PT W/DXA RESULTS DOCUMENT: HCPCS | Performed by: FAMILY MEDICINE

## 2025-03-20 PROCEDURE — 1090F PRES/ABSN URINE INCON ASSESS: CPT | Performed by: FAMILY MEDICINE

## 2025-03-20 ASSESSMENT — PATIENT HEALTH QUESTIONNAIRE - PHQ9
SUM OF ALL RESPONSES TO PHQ QUESTIONS 1-9: 0
2. FEELING DOWN, DEPRESSED OR HOPELESS: NOT AT ALL
SUM OF ALL RESPONSES TO PHQ QUESTIONS 1-9: 0
SUM OF ALL RESPONSES TO PHQ QUESTIONS 1-9: 0
1. LITTLE INTEREST OR PLEASURE IN DOING THINGS: NOT AT ALL
SUM OF ALL RESPONSES TO PHQ QUESTIONS 1-9: 0

## 2025-03-20 NOTE — PROGRESS NOTES
[facility] Progress Note:   F/up Physician Visit    CC: Weight Management      Maria G Sanchez is a 69 y.o. female who is here for her  f/up physician visit for the / LCD Program.    Feb 215  Now 216  Goal is to get under 200  Post GBP    I ordered an us of the thyroid and nodules were seen  I sent message to her to go see her pcp to get ref to ENT. She may need biopsy and I explained all of this today  She has not done that yet    She hurt her leg so she does not cook , instead eating restaurant food and not exercising            3/20/2025    10:06 AM 3/20/2025    10:00 AM 3/10/2025     9:17 AM 2/24/2025    10:20 AM 2/6/2025     9:00 AM 2/6/2025     8:56 AM 1/15/2025     8:47 AM   Weight Metrics   Weight 216 lb 14.4 oz  216 lb 218 lb  215 lb 3.2 oz 217 lb 6.4 oz   Neck (Inches)  12.75 in   12.5 in     Waist Measure Inches  45 in   44.75 in     Body Fat %  47.7 %   47.5 %     BMI (Calculated) 41.1 kg/m2  40.9 kg/m2 41.3 kg/m2  40.7 kg/m2 41.2 kg/m2          No data to display                   Current Outpatient Medications   Medication Sig Dispense Refill    Acetaminophen Extra Strength 500 MG TABS Take 2 tablets by mouth every 6 hours as needed      cyclobenzaprine (FLEXERIL) 5 MG tablet TAKE 1 TABLET BY MOUTH AT BEDTIME AS NEEDED FOR MUSCLE PAIN      ibuprofen (ADVIL;MOTRIN) 800 MG tablet Take 1 tablet by mouth every 6 hours as needed for Pain      predniSONE 5 MG (21) TBPK Take by mouth      Cholecalciferol (VITAMIN D3) 50 MCG (2000 UT) TABS TAKE 1 TABLET BY MOUTH DAILY 90 tablet 1    amLODIPine-benazepril (LOTREL) 5-20 MG per capsule TAKE 1 CAPSULE BY MOUTH DAILY 90 capsule 1    buPROPion (WELLBUTRIN SR) 100 MG extended release tablet Take 1 tablet by mouth 2 times daily 180 tablet 1    atorvastatin (LIPITOR) 40 MG tablet TAKE 1 TABLET BY MOUTH EVERY NIGHT 90 tablet 0    montelukast (SINGULAIR) 10 MG tablet Take 1 tablet by mouth nightly      albuterol sulfate HFA

## 2025-03-20 NOTE — PROGRESS NOTES
Identified pt with two pt identifiers (name and ). Reviewed chart in preparation for visit and have obtained necessary documentation.    Maria G Sanchez is a 69 y.o. female  Chief Complaint   Patient presents with    Weight Management     1 month follow up     /84 (BP Site: Left Upper Arm, Patient Position: Sitting, BP Cuff Size: Large Adult) Comment: manual  Pulse 63   Temp 97.8 °F (36.6 °C) (Oral)   Resp 20   Ht 1.549 m (5' 1\")   Wt 98.4 kg (216 lb 14.4 oz)   SpO2 95%   BMI 40.98 kg/m²     1. Have you been to the ER, urgent care clinic since your last visit?  Hospitalized since your last visit?no    2. Have you seen or consulted any other health care providers outside of the [facility] since your last visit?  Include any pap smears or colon screening. No    BMI - 41.1

## 2025-03-31 ENCOUNTER — CLINICAL SUPPORT (OUTPATIENT)
Age: 70
End: 2025-03-31

## 2025-03-31 VITALS
BODY MASS INDEX: 40.95 KG/M2 | TEMPERATURE: 97.8 F | WEIGHT: 216.9 LBS | DIASTOLIC BLOOD PRESSURE: 87 MMHG | OXYGEN SATURATION: 98 % | HEIGHT: 61 IN | HEART RATE: 71 BPM | RESPIRATION RATE: 18 BRPM | SYSTOLIC BLOOD PRESSURE: 134 MMHG

## 2025-03-31 DIAGNOSIS — E66.813 CLASS 3 SEVERE OBESITY DUE TO EXCESS CALORIES WITH SERIOUS COMORBIDITY AND BODY MASS INDEX (BMI) OF 40.0 TO 44.9 IN ADULT: Primary | ICD-10-CM

## 2025-03-31 DIAGNOSIS — E04.1 RIGHT THYROID NODULE: ICD-10-CM

## 2025-03-31 DIAGNOSIS — E66.01 CLASS 3 SEVERE OBESITY DUE TO EXCESS CALORIES WITH SERIOUS COMORBIDITY AND BODY MASS INDEX (BMI) OF 40.0 TO 44.9 IN ADULT: Primary | ICD-10-CM

## 2025-03-31 DIAGNOSIS — E78.5 HYPERLIPIDEMIA, UNSPECIFIED HYPERLIPIDEMIA TYPE: ICD-10-CM

## 2025-03-31 ASSESSMENT — PATIENT HEALTH QUESTIONNAIRE - PHQ9
SUM OF ALL RESPONSES TO PHQ QUESTIONS 1-9: 0
SUM OF ALL RESPONSES TO PHQ QUESTIONS 1-9: 0
2. FEELING DOWN, DEPRESSED OR HOPELESS: NOT AT ALL
SUM OF ALL RESPONSES TO PHQ QUESTIONS 1-9: 0
SUM OF ALL RESPONSES TO PHQ QUESTIONS 1-9: 0
1. LITTLE INTEREST OR PLEASURE IN DOING THINGS: NOT AT ALL

## 2025-03-31 NOTE — PROGRESS NOTES
Progress Note: Weekly Education Class in the [facility]         Patient is on Very Low Calorie Diet [] (4 meal replacements per day, 800 kcal/day)      Low Calorie Diet [x] (2-3 meal replacements per day, 7279-9653 kcal/day)    1) Did patient have any new symptoms or physical problems?   Yes [x]    No []    If yes, check & comment: weakness [], fatigue [], lightheadedness [], headache [x], cramps [x], cold intolerance [], hair loss [], diarrhea [], constipation [],  NA [] other:                                 2) Has patient had any medical attention from other providers, urgent care or the emergency room this week?  Yes [x]  No []       NA [], If yes, why: [address]                                      3) Any other sugar sweetened beverages consumed this week?   Yes [x]  No []    4) Did patient have any problems adhering to the diet? Yes [x]  No [] NA []    If yes, Vacation [], Celebrations [x], Conferences [], Family Reunions [] other:                                                5) How many hours of sleep this week? 5-7    (range)  NA []    Number of meal replacements consumed daily? 1-2 (range)  NA []    Average ounces of water patient consumed daily this week (not including shakes)? 19     (divide the weekly total by 7)    Did you eat any food outside of the program? Yes [] No [x]    Physical Activity Over the Past Week:    Cardio exercise: 0 min  Strength exercise: 0 workouts / week  Number of steps walked per day: 9121-3086    How has patient mood overall been this week? Sad [], Happy [], Stressed [], Tired [], Content [], NA [], other Several different moods             Medications reconciled by nurse Yes [x]  No[]    Patient was given therapeutic recommendations for any noted side effects of their dietary approach based upon [facility] patient manual per providers recommendation.

## 2025-03-31 NOTE — PROGRESS NOTES
Identified pt with two pt identifiers (name and ). Reviewed chart in preparation for visit and have obtained necessary documentation.    Maria G Sanchez is a 69 y.o. female  Chief Complaint   Patient presents with    Weight Management     /87 (BP Site: Right Upper Arm, Patient Position: Sitting, BP Cuff Size: Large Adult)   Pulse 71   Temp 97.8 °F (36.6 °C) (Oral)   Resp 18   Ht 1.549 m (5' 1\")   Wt 98.4 kg (216 lb 14.4 oz)   SpO2 98%   BMI 40.98 kg/m²     1. Have you been to the ER, urgent care clinic since your last visit?  Hospitalized since your last visit?no    2. Have you seen or consulted any other health care providers outside of the [facility] since your last visit?  Include any pap smears or colon screening. yes - [facility]; now has a heart monitor

## 2025-04-01 NOTE — PROGRESS NOTES
Nurse note from patient's weekly / LCD / Maintenance class was reviewed.  Pertinent medical concerns were:   reviewed     BP Readings from Last 3 Encounters:   03/31/25 134/87   03/20/25 124/84   03/10/25 134/87       Failed to redirect to the Timeline version of the [employer] SmartLink.    Current Outpatient Medications   Medication Sig Dispense Refill    Acetaminophen Extra Strength 500 MG TABS Take 2 tablets by mouth every 6 hours as needed      cyclobenzaprine (FLEXERIL) 5 MG tablet TAKE 1 TABLET BY MOUTH AT BEDTIME AS NEEDED FOR MUSCLE PAIN      ibuprofen (ADVIL;MOTRIN) 800 MG tablet Take 1 tablet by mouth every 6 hours as needed for Pain      predniSONE 5 MG (21) TBPK Take by mouth      Cholecalciferol (VITAMIN D3) 50 MCG (2000 UT) TABS TAKE 1 TABLET BY MOUTH DAILY 90 tablet 1    amLODIPine-benazepril (LOTREL) 5-20 MG per capsule TAKE 1 CAPSULE BY MOUTH DAILY 90 capsule 1    buPROPion (WELLBUTRIN SR) 100 MG extended release tablet Take 1 tablet by mouth 2 times daily 180 tablet 1    atorvastatin (LIPITOR) 40 MG tablet TAKE 1 TABLET BY MOUTH EVERY NIGHT 90 tablet 0    montelukast (SINGULAIR) 10 MG tablet Take 1 tablet by mouth nightly      albuterol sulfate HFA (PROVENTIL;VENTOLIN;PROAIR) 108 (90 Base) MCG/ACT inhaler Inhale 2 puffs into the lungs every 4 hours as needed      aspirin 81 MG chewable tablet Take 1 tablet by mouth daily      tiotropium (SPIRIVA RESPIMAT) 1.25 MCG/ACT AERS inhaler Inhale 2 puffs into the lungs daily      mepolizumab (NUCALA) 100 MG/ML SOSY injection Inject 1 mL into the skin every 30 days      fluticasone furoate-vilanterol (BREO ELLIPTA) 200-25 MCG/ACT AEPB inhaler Inhale 1 puff into the lungs daily      azelastine (ASTELIN) 0.1 % nasal spray 2 sprays by Nasal route 2 times daily as needed      Multiple Vitamins-Minerals (ONE-A-DAY WOMENS 50+ PO) Take 1 tablet by mouth daily       No current facility-administered medications for this visit.

## 2025-04-08 PROBLEM — E04.1 THYROID NODULE: Status: ACTIVE | Noted: 2025-04-08

## 2025-04-14 ENCOUNTER — CLINICAL SUPPORT (OUTPATIENT)
Age: 70
End: 2025-04-14

## 2025-04-14 VITALS
DIASTOLIC BLOOD PRESSURE: 84 MMHG | SYSTOLIC BLOOD PRESSURE: 129 MMHG | TEMPERATURE: 97.7 F | OXYGEN SATURATION: 98 % | HEIGHT: 61 IN | WEIGHT: 217.3 LBS | BODY MASS INDEX: 41.03 KG/M2 | HEART RATE: 76 BPM | RESPIRATION RATE: 20 BRPM

## 2025-04-14 DIAGNOSIS — E04.1 RIGHT THYROID NODULE: ICD-10-CM

## 2025-04-14 DIAGNOSIS — E66.813 CLASS 3 SEVERE OBESITY DUE TO EXCESS CALORIES WITH SERIOUS COMORBIDITY AND BODY MASS INDEX (BMI) OF 40.0 TO 44.9 IN ADULT: Primary | ICD-10-CM

## 2025-04-14 DIAGNOSIS — E78.5 HYPERLIPIDEMIA, UNSPECIFIED HYPERLIPIDEMIA TYPE: ICD-10-CM

## 2025-04-14 ASSESSMENT — PATIENT HEALTH QUESTIONNAIRE - PHQ9
SUM OF ALL RESPONSES TO PHQ QUESTIONS 1-9: 0
2. FEELING DOWN, DEPRESSED OR HOPELESS: NOT AT ALL
1. LITTLE INTEREST OR PLEASURE IN DOING THINGS: NOT AT ALL
SUM OF ALL RESPONSES TO PHQ QUESTIONS 1-9: 0

## 2025-04-14 NOTE — PROGRESS NOTES
Identified pt with two pt identifiers (name and ). Reviewed chart in preparation for visit and have obtained necessary documentation.    Maria G Sanchez is a 69 y.o. female  Chief Complaint   Patient presents with    Weight Management     /84 (BP Site: Left Upper Arm, Patient Position: Sitting, BP Cuff Size: Large Adult)   Pulse 76   Temp 97.7 °F (36.5 °C) (Oral)   Resp 20   Ht 1.549 m (5' 1\")   Wt 98.6 kg (217 lb 4.8 oz)   SpO2 98%   BMI 41.06 kg/m²     1. Have you been to the ER, urgent care clinic since your last visit?  Hospitalized since your last visit?no    2. Have you seen or consulted any other health care providers outside of the [facility] since your last visit?  Include any pap smears or colon screening. no

## 2025-04-14 NOTE — PROGRESS NOTES
Progress Note: Weekly Education Class in the [facility] Loss Program         Patient is on Very Low Calorie Diet [] (4 meal replacements per day, 800 kcal/day)      Low Calorie Diet [x] (2-3 meal replacements per day, 5257-9894 kcal/day)    1) Did patient have any new symptoms or physical problems?   Yes []    No [x]    If yes, check & comment: weakness [], fatigue [], lightheadedness [], headache [], cramps [], cold intolerance [], hair loss [], diarrhea [], constipation [],  NA [] other:                                 2) Has patient had any medical attention from other providers, urgent care or the emergency room this week?  Yes [x]  No []       NA [], If yes, why:                                       3) Any other sugar sweetened beverages consumed this week?   Yes [x]  No []    4) Did patient have any problems adhering to the diet? Yes [x]  No [] NA []    If yes, Vacation [], Celebrations [x], Conferences [], Family Reunions [] other:                                                5) How many hours of sleep this week? 3.5-7    (range)  NA []    Number of meal replacements consumed daily? 1-2 (range)  NA []    Average ounces of water patient consumed daily this week (not including shakes)? 16     (divide the weekly total by 7)    Did you eat any food outside of the program? Yes [x] No []    Physical Activity Over the Past Week:    Cardio exercise: 0 min  Strength exercise: 0 workouts / week  Number of steps walked per day: 1477-8981    How has patient mood overall been this week? Sad [], Happy [], Stressed [], Tired [], Content [], NA [], other NOT ANSWERED             Medications reconciled by nurse Yes [x]  No[]    Patient was given therapeutic recommendations for any noted side effects of their dietary approach based upon [facility] patient manual per providers recommendation.

## 2025-04-15 NOTE — PROGRESS NOTES
Nurse note from patient's weekly VLCD / LCD / Maintenance class was reviewed.  Pertinent medical concerns were:   reviewed     BP Readings from Last 3 Encounters:   04/14/25 129/84   03/31/25 134/87   03/20/25 124/84       Failed to redirect to the Timeline version of the [facility] SmartLink.    Current Outpatient Medications   Medication Sig Dispense Refill    Acetaminophen Extra Strength 500 MG TABS Take 2 tablets by mouth every 6 hours as needed      cyclobenzaprine (FLEXERIL) 5 MG tablet TAKE 1 TABLET BY MOUTH AT BEDTIME AS NEEDED FOR MUSCLE PAIN      ibuprofen (ADVIL;MOTRIN) 800 MG tablet Take 1 tablet by mouth every 6 hours as needed for Pain      predniSONE 5 MG (21) TBPK Take by mouth      Cholecalciferol (VITAMIN D3) 50 MCG (2000 UT) TABS TAKE 1 TABLET BY MOUTH DAILY 90 tablet 1    amLODIPine-benazepril (LOTREL) 5-20 MG per capsule TAKE 1 CAPSULE BY MOUTH DAILY 90 capsule 1    buPROPion (WELLBUTRIN SR) 100 MG extended release tablet Take 1 tablet by mouth 2 times daily 180 tablet 1    atorvastatin (LIPITOR) 40 MG tablet TAKE 1 TABLET BY MOUTH EVERY NIGHT 90 tablet 0    montelukast (SINGULAIR) 10 MG tablet Take 1 tablet by mouth nightly      albuterol sulfate HFA (PROVENTIL;VENTOLIN;PROAIR) 108 (90 Base) MCG/ACT inhaler Inhale 2 puffs into the lungs every 4 hours as needed      aspirin 81 MG chewable tablet Take 1 tablet by mouth daily      tiotropium (SPIRIVA RESPIMAT) 1.25 MCG/ACT AERS inhaler Inhale 2 puffs into the lungs daily      mepolizumab (NUCALA) 100 MG/ML SOSY injection Inject 1 mL into the skin every 30 days      fluticasone furoate-vilanterol (BREO ELLIPTA) 200-25 MCG/ACT AEPB inhaler Inhale 1 puff into the lungs daily      azelastine (ASTELIN) 0.1 % nasal spray 2 sprays by Nasal route 2 times daily as needed      Multiple Vitamins-Minerals (ONE-A-DAY WOMENS 50+ PO) Take 1 tablet by mouth daily       No current facility-administered medications for this visit.

## 2025-04-18 ENCOUNTER — RESULTS FOLLOW-UP (OUTPATIENT)
Age: 70
End: 2025-04-18

## 2025-04-22 ENCOUNTER — TRANSCRIBE ORDERS (OUTPATIENT)
Facility: HOSPITAL | Age: 70
End: 2025-04-22

## 2025-04-22 DIAGNOSIS — M26.609 TEMPOROMANDIBULAR JOINT DISORDER: ICD-10-CM

## 2025-04-22 DIAGNOSIS — E04.1 THYROID NODULE: Primary | ICD-10-CM

## 2025-04-22 DIAGNOSIS — Z78.9 NON-SMOKER: ICD-10-CM

## 2025-04-22 DIAGNOSIS — H92.02 LEFT EAR PAIN: ICD-10-CM

## 2025-04-29 ENCOUNTER — OFFICE VISIT (OUTPATIENT)
Age: 70
End: 2025-04-29

## 2025-04-29 DIAGNOSIS — E66.813 CLASS 3 SEVERE OBESITY DUE TO EXCESS CALORIES WITH SERIOUS COMORBIDITY AND BODY MASS INDEX (BMI) OF 40.0 TO 44.9 IN ADULT (HCC): Primary | ICD-10-CM

## 2025-05-05 NOTE — PROGRESS NOTES
[facility]  Metabolic Weight Loss Program        Patient's Name: Maria G Sanchez  : 1955    This patient is a participant at [facility] and attended the weekly virtual nutrition class hosted via [email or web address].      Shannan Conner, MS, RD, LDN

## 2025-05-09 ENCOUNTER — HOSPITAL ENCOUNTER (OUTPATIENT)
Facility: HOSPITAL | Age: 70
Discharge: HOME | End: 2025-05-12
Attending: OTOLARYNGOLOGY
Payer: MEDICARE

## 2025-05-09 DIAGNOSIS — E04.1 THYROID NODULE: ICD-10-CM

## 2025-05-09 DIAGNOSIS — H92.02 LEFT EAR PAIN: ICD-10-CM

## 2025-05-09 DIAGNOSIS — Z78.9 NON-SMOKER: ICD-10-CM

## 2025-05-09 DIAGNOSIS — M26.609 TEMPOROMANDIBULAR JOINT DISORDER: ICD-10-CM

## 2025-05-09 PROCEDURE — 88172 CYTP DX EVAL FNA 1ST EA SITE: CPT

## 2025-05-09 PROCEDURE — 10005 FNA BX W/US GDN 1ST LES: CPT

## 2025-05-09 PROCEDURE — 88173 CYTOPATH EVAL FNA REPORT: CPT

## 2025-05-14 DIAGNOSIS — E66.813 CLASS 3 SEVERE OBESITY DUE TO EXCESS CALORIES WITH SERIOUS COMORBIDITY AND BODY MASS INDEX (BMI) OF 40.0 TO 44.9 IN ADULT (HCC): ICD-10-CM

## 2025-05-14 RX ORDER — BUPROPION HYDROCHLORIDE 100 MG/1
100 TABLET, EXTENDED RELEASE ORAL 2 TIMES DAILY
Qty: 60 TABLET | Refills: 0 | Status: SHIPPED | OUTPATIENT
Start: 2025-05-14

## 2025-05-14 NOTE — PROGRESS NOTES
Last Rx on 12/19/2024 for #180 with 1 RF    Last seen by Dr. Urbina on 3/20    Next follow up not scheduled

## 2025-05-20 ENCOUNTER — OFFICE VISIT (OUTPATIENT)
Age: 70
End: 2025-05-20

## 2025-05-20 DIAGNOSIS — E66.813 CLASS 3 SEVERE OBESITY DUE TO EXCESS CALORIES WITH SERIOUS COMORBIDITY AND BODY MASS INDEX (BMI) OF 40.0 TO 44.9 IN ADULT (HCC): Primary | ICD-10-CM

## 2025-05-22 NOTE — PROGRESS NOTES
[facility]  Metabolic Weight Loss Program        Patient's Name: Maria G Sanchez  : 1955    This patient is a participant at [facility] and attended the weekly virtual nutrition class hosted via [employer].      Shannan Conner, MS, RD, LDN

## 2025-05-27 ENCOUNTER — OFFICE VISIT (OUTPATIENT)
Age: 70
End: 2025-05-27

## 2025-05-27 DIAGNOSIS — E66.813 CLASS 3 SEVERE OBESITY DUE TO EXCESS CALORIES WITH SERIOUS COMORBIDITY AND BODY MASS INDEX (BMI) OF 40.0 TO 44.9 IN ADULT (HCC): Primary | ICD-10-CM

## 2025-05-28 NOTE — PROGRESS NOTES
[facility]  Metabolic Weight Loss Program        Patient's Name: Maria G Sanchez  : 1955    This patient is a participant at [facility] and attended the weekly virtual nutrition class hosted via [employer].      Shannan Conner, MS, RD, LDN

## 2025-06-03 ENCOUNTER — OFFICE VISIT (OUTPATIENT)
Age: 70
End: 2025-06-03
Payer: MEDICARE

## 2025-06-03 VITALS
RESPIRATION RATE: 18 BRPM | BODY MASS INDEX: 41.04 KG/M2 | HEIGHT: 61 IN | HEART RATE: 75 BPM | TEMPERATURE: 98.6 F | SYSTOLIC BLOOD PRESSURE: 128 MMHG | DIASTOLIC BLOOD PRESSURE: 86 MMHG | WEIGHT: 217.4 LBS | OXYGEN SATURATION: 98 %

## 2025-06-03 DIAGNOSIS — I10 PRIMARY HYPERTENSION: ICD-10-CM

## 2025-06-03 DIAGNOSIS — E66.813 CLASS 3 SEVERE OBESITY DUE TO EXCESS CALORIES WITH SERIOUS COMORBIDITY AND BODY MASS INDEX (BMI) OF 40.0 TO 44.9 IN ADULT (HCC): Primary | ICD-10-CM

## 2025-06-03 DIAGNOSIS — E04.1 RIGHT THYROID NODULE: ICD-10-CM

## 2025-06-03 DIAGNOSIS — E78.5 HYPERLIPIDEMIA, UNSPECIFIED HYPERLIPIDEMIA TYPE: ICD-10-CM

## 2025-06-03 DIAGNOSIS — R73.9 BLOOD GLUCOSE ELEVATED: ICD-10-CM

## 2025-06-03 DIAGNOSIS — Z86.73 HISTORY OF TRANSIENT ISCHEMIC ATTACK (TIA): ICD-10-CM

## 2025-06-03 PROCEDURE — 1090F PRES/ABSN URINE INCON ASSESS: CPT | Performed by: FAMILY MEDICINE

## 2025-06-03 PROCEDURE — G8417 CALC BMI ABV UP PARAM F/U: HCPCS | Performed by: FAMILY MEDICINE

## 2025-06-03 PROCEDURE — 1036F TOBACCO NON-USER: CPT | Performed by: FAMILY MEDICINE

## 2025-06-03 PROCEDURE — 1126F AMNT PAIN NOTED NONE PRSNT: CPT | Performed by: FAMILY MEDICINE

## 2025-06-03 PROCEDURE — 3079F DIAST BP 80-89 MM HG: CPT | Performed by: FAMILY MEDICINE

## 2025-06-03 PROCEDURE — 3074F SYST BP LT 130 MM HG: CPT | Performed by: FAMILY MEDICINE

## 2025-06-03 PROCEDURE — 3017F COLORECTAL CA SCREEN DOC REV: CPT | Performed by: FAMILY MEDICINE

## 2025-06-03 PROCEDURE — G8399 PT W/DXA RESULTS DOCUMENT: HCPCS | Performed by: FAMILY MEDICINE

## 2025-06-03 PROCEDURE — 1123F ACP DISCUSS/DSCN MKR DOCD: CPT | Performed by: FAMILY MEDICINE

## 2025-06-03 PROCEDURE — 99214 OFFICE O/P EST MOD 30 MIN: CPT | Performed by: FAMILY MEDICINE

## 2025-06-03 PROCEDURE — 1159F MED LIST DOCD IN RCRD: CPT | Performed by: FAMILY MEDICINE

## 2025-06-03 PROCEDURE — G8427 DOCREV CUR MEDS BY ELIG CLIN: HCPCS | Performed by: FAMILY MEDICINE

## 2025-06-03 RX ORDER — ALENDRONATE SODIUM 35 MG/1
35 TABLET ORAL
COMMUNITY
Start: 2025-04-28

## 2025-06-03 RX ORDER — METHOCARBAMOL 500 MG/1
500 TABLET, FILM COATED ORAL 4 TIMES DAILY
COMMUNITY

## 2025-06-03 RX ORDER — BUPROPION HYDROCHLORIDE 150 MG/1
150 TABLET, EXTENDED RELEASE ORAL 2 TIMES DAILY
Qty: 60 TABLET | Refills: 2 | Status: SHIPPED | OUTPATIENT
Start: 2025-06-03

## 2025-06-03 ASSESSMENT — PATIENT HEALTH QUESTIONNAIRE - PHQ9
2. FEELING DOWN, DEPRESSED OR HOPELESS: NOT AT ALL
SUM OF ALL RESPONSES TO PHQ QUESTIONS 1-9: 0
1. LITTLE INTEREST OR PLEASURE IN DOING THINGS: NOT AT ALL
SUM OF ALL RESPONSES TO PHQ QUESTIONS 1-9: 0

## 2025-06-03 NOTE — PROGRESS NOTES
Identified pt with two pt identifiers (name and ). Reviewed chart in preparation for visit and have obtained necessary documentation.    Maria G Sanchez is a 69 y.o. female  Chief Complaint   Patient presents with    Weight Management     Restart - last seen 2025     /86 (BP Site: Right Upper Arm, Patient Position: Sitting, BP Cuff Size: Large Adult)   Pulse 75   Temp 98.6 °F (37 °C) (Oral)   Resp 18   Ht 1.549 m (5' 1\")   Wt 98.6 kg (217 lb 6.4 oz)   SpO2 98%   BMI 41.08 kg/m²     1. Have you been to the ER, urgent care clinic since your last visit?  Hospitalized since your last visit?yes - [facility]    2. Have you seen or consulted any other health care providers outside of the [facility] since your last visit?  Include any pap smears or colon screening. yes - Ji Gould PCP    BMI - 41.2

## 2025-06-03 NOTE — PROGRESS NOTES
[facility] Progress Note:   F/up Physician Visit    CC: Weight Management      Maria G Sanchez is a 69 y.o. female who is here for her  f/up physician visit for theLCD Program.    march 216  Now 217  Goal is to get under 200  Post GBP  Has not been here since March  The last meal rep was last Wednesday  She is taking wellbutrin  100 mg bid  This is helping her appetite \" a little\"      6/3/2025     3:18 PM 6/3/2025     3:00 PM 4/14/2025    11:16 AM 3/31/2025     8:47 AM 3/20/2025    10:06 AM 3/20/2025    10:00 AM 3/10/2025     9:17 AM   Weight Metrics   Weight 217 lb 6.4 oz  217 lb 4.8 oz 216 lb 14.4 oz 216 lb 14.4 oz  216 lb   Neck (Inches)  12.5 in    12.75 in    Waist Measure Inches  43 in    45 in    Body Fat %  47.7 %    47.7 %    BMI (Calculated) 41.2 kg/m2  41.1 kg/m2 41.1 kg/m2 41.1 kg/m2  40.9 kg/m2          No data to display                   Current Outpatient Medications   Medication Sig Dispense Refill    alendronate (FOSAMAX) 35 MG tablet Take 1 tablet by mouth every 7 days      methocarbamol (ROBAXIN) 500 MG tablet Take 1 tablet by mouth 4 times daily      buPROPion (WELLBUTRIN SR) 150 MG extended release tablet Take 1 tablet by mouth 2 times daily 60 tablet 2    Acetaminophen Extra Strength 500 MG TABS Take 2 tablets by mouth every 6 hours as needed      cyclobenzaprine (FLEXERIL) 5 MG tablet TAKE 1 TABLET BY MOUTH AT BEDTIME AS NEEDED FOR MUSCLE PAIN      ibuprofen (ADVIL;MOTRIN) 800 MG tablet Take 1 tablet by mouth every 6 hours as needed for Pain      predniSONE 5 MG (21) TBPK Take by mouth      Cholecalciferol (VITAMIN D3) 50 MCG (2000 UT) TABS TAKE 1 TABLET BY MOUTH DAILY 90 tablet 1    amLODIPine-benazepril (LOTREL) 5-20 MG per capsule TAKE 1 CAPSULE BY MOUTH DAILY 90 capsule 1    atorvastatin (LIPITOR) 40 MG tablet TAKE 1 TABLET BY MOUTH EVERY NIGHT 90 tablet 0    montelukast (SINGULAIR) 10 MG tablet Take 1 tablet by mouth nightly      albuterol sulfate HFA

## 2025-06-04 LAB
ALBUMIN SERPL-MCNC: 4.2 G/DL (ref 3.8–4.8)
ALP SERPL-CCNC: 108 IU/L (ref 44–121)
ALT SERPL-CCNC: 30 IU/L (ref 0–32)
AST SERPL-CCNC: 25 IU/L (ref 0–40)
BILIRUB SERPL-MCNC: <0.2 MG/DL (ref 0–1.2)
BUN SERPL-MCNC: 15 MG/DL (ref 8–27)
BUN/CREAT SERPL: 25 (ref 12–28)
CALCIUM SERPL-MCNC: 9.9 MG/DL (ref 8.7–10.3)
CHLORIDE SERPL-SCNC: 107 MMOL/L (ref 96–106)
CHOLEST SERPL-MCNC: 188 MG/DL (ref 100–199)
CO2 SERPL-SCNC: 23 MMOL/L (ref 20–29)
CREAT SERPL-MCNC: 0.61 MG/DL (ref 0.57–1)
EGFRCR SERPLBLD CKD-EPI 2021: 96 ML/MIN/1.73
GLOBULIN SER CALC-MCNC: 2.1 G/DL (ref 1.5–4.5)
GLUCOSE SERPL-MCNC: 88 MG/DL (ref 70–99)
HBA1C MFR BLD: 5.6 % (ref 4.8–5.6)
HDLC SERPL-MCNC: 93 MG/DL
LDLC SERPL CALC-MCNC: 81 MG/DL (ref 0–99)
POTASSIUM SERPL-SCNC: 4.5 MMOL/L (ref 3.5–5.2)
PROT SERPL-MCNC: 6.3 G/DL (ref 6–8.5)
SODIUM SERPL-SCNC: 145 MMOL/L (ref 134–144)
TRIGL SERPL-MCNC: 78 MG/DL (ref 0–149)
VLDLC SERPL CALC-MCNC: 14 MG/DL (ref 5–40)

## 2025-06-05 ENCOUNTER — RESULTS FOLLOW-UP (OUTPATIENT)
Age: 70
End: 2025-06-05

## 2025-06-05 ENCOUNTER — OFFICE VISIT (OUTPATIENT)
Age: 70
End: 2025-06-05

## 2025-06-05 DIAGNOSIS — E66.813 CLASS 3 SEVERE OBESITY DUE TO EXCESS CALORIES WITH SERIOUS COMORBIDITY AND BODY MASS INDEX (BMI) OF 40.0 TO 44.9 IN ADULT (HCC): Primary | ICD-10-CM

## 2025-06-05 DIAGNOSIS — E87.0 HYPERNATREMIA: ICD-10-CM

## 2025-06-09 NOTE — PROGRESS NOTES
[facility]  Metabolic Weight Loss Program        Patient's Name: Maria G Sanchez  : 1955    This patient is a participant at [facility] and attended the weekly virtual nutrition class hosted via [employer].      Shannan Conner, MS, RD, LDN

## 2025-06-16 ENCOUNTER — CLINICAL SUPPORT (OUTPATIENT)
Age: 70
End: 2025-06-16

## 2025-06-16 VITALS
OXYGEN SATURATION: 94 % | SYSTOLIC BLOOD PRESSURE: 128 MMHG | HEART RATE: 74 BPM | HEIGHT: 61 IN | RESPIRATION RATE: 20 BRPM | BODY MASS INDEX: 40.97 KG/M2 | WEIGHT: 217 LBS | DIASTOLIC BLOOD PRESSURE: 85 MMHG | TEMPERATURE: 98.1 F

## 2025-06-16 DIAGNOSIS — R73.9 BLOOD GLUCOSE ELEVATED: ICD-10-CM

## 2025-06-16 DIAGNOSIS — E78.5 HYPERLIPIDEMIA, UNSPECIFIED HYPERLIPIDEMIA TYPE: ICD-10-CM

## 2025-06-16 DIAGNOSIS — E04.1 RIGHT THYROID NODULE: ICD-10-CM

## 2025-06-16 DIAGNOSIS — I10 PRIMARY HYPERTENSION: ICD-10-CM

## 2025-06-16 DIAGNOSIS — E66.813 CLASS 3 SEVERE OBESITY DUE TO EXCESS CALORIES WITH SERIOUS COMORBIDITY AND BODY MASS INDEX (BMI) OF 40.0 TO 44.9 IN ADULT (HCC): Primary | ICD-10-CM

## 2025-06-16 ASSESSMENT — PATIENT HEALTH QUESTIONNAIRE - PHQ9
SUM OF ALL RESPONSES TO PHQ QUESTIONS 1-9: 0
SUM OF ALL RESPONSES TO PHQ QUESTIONS 1-9: 0
1. LITTLE INTEREST OR PLEASURE IN DOING THINGS: NOT AT ALL
SUM OF ALL RESPONSES TO PHQ QUESTIONS 1-9: 0
2. FEELING DOWN, DEPRESSED OR HOPELESS: NOT AT ALL
SUM OF ALL RESPONSES TO PHQ QUESTIONS 1-9: 0

## 2025-06-16 NOTE — PROGRESS NOTES
6/4/2025    Progress Note: Weekly Education Class in the [facility] Loss Program         Patient is on Very Low Calorie Diet [] (4 meal replacements per day, 800 kcal/day)      Low Calorie Diet [x] (2-3 meal replacements per day, 3803-4103 kcal/day)    1) Did patient have any new symptoms or physical problems?   Yes []    No [x]    If yes, check & comment: weakness [], fatigue [], lightheadedness [], headache [], cramps [], cold intolerance [], hair loss [], diarrhea [], constipation [],  NA [] other:                                 2) Has patient had any medical attention from other providers, urgent care or the emergency room this week?  Yes [x]  No []       NA [], If yes, why: Follow up with PCP                                      3) Any other sugar sweetened beverages consumed this week?   Yes [x]  No []    4) Did patient have any problems adhering to the diet? Yes [x]  No [] NA []    If yes, Vacation [x], Celebrations [], Conferences [], Family Reunions [] other:                                                5) How many hours of sleep this week? 5-7    (range)  NA []    Number of meal replacements consumed daily? 1 (range)  NA []    Average ounces of water patient consumed daily this week (not including shakes)? 38     (divide the weekly total by 7)    Did you eat any food outside of the program? Yes [x] No []    Physical Activity Over the Past Week:    Cardio exercise: 0 min  Strength exercise: 0 workouts / week  Number of steps walked per day: 7025-5854    How has patient mood overall been this week? Sad [], Happy [], Stressed [], Tired [], Content [], NA [], other NOT ANSWERED             Medications reconciled by nurse Yes [x]  No[]    Patient was given therapeutic recommendations for any noted side effects of their dietary approach based upon [facility] patient manual per providers recommendation.     6/9/2025    Progress Note: Weekly Education Class in the [facility]

## 2025-06-16 NOTE — PROGRESS NOTES
Identified pt with two pt identifiers (name and ). Reviewed chart in preparation for visit and have obtained necessary documentation.    Maria G Sanchez is a 69 y.o. female  Chief Complaint   Patient presents with    Weight Management     /85 (BP Site: Right Upper Arm, Patient Position: Sitting, BP Cuff Size: Large Adult)   Pulse 74   Temp 98.1 °F (36.7 °C) (Oral)   Resp 20   Ht 1.549 m (5' 1\")   Wt 98.4 kg (217 lb)   SpO2 94%   BMI 41.00 kg/m²     1. Have you been to the ER, urgent care clinic since your last visit?  Hospitalized since your last visit?no    2. Have you seen or consulted any other health care providers outside of the [facility] since your last visit?  Include any pap smears or colon screening. no

## 2025-06-18 NOTE — PROGRESS NOTES
Nurse note from patient's weekly  LCD / Maintenance class was reviewed.  Pertinent medical concerns were:   reviewed     BP Readings from Last 3 Encounters:   06/16/25 128/85   06/03/25 128/86   04/14/25 129/84       [facility]    Current Outpatient Medications   Medication Sig Dispense Refill    alendronate (FOSAMAX) 35 MG tablet Take 1 tablet by mouth every 7 days      methocarbamol (ROBAXIN) 500 MG tablet Take 1 tablet by mouth 4 times daily      buPROPion (WELLBUTRIN SR) 150 MG extended release tablet Take 1 tablet by mouth 2 times daily 60 tablet 2    Acetaminophen Extra Strength 500 MG TABS Take 2 tablets by mouth every 6 hours as needed      cyclobenzaprine (FLEXERIL) 5 MG tablet TAKE 1 TABLET BY MOUTH AT BEDTIME AS NEEDED FOR MUSCLE PAIN      ibuprofen (ADVIL;MOTRIN) 800 MG tablet Take 1 tablet by mouth every 6 hours as needed for Pain      Cholecalciferol (VITAMIN D3) 50 MCG (2000 UT) TABS TAKE 1 TABLET BY MOUTH DAILY 90 tablet 1    amLODIPine-benazepril (LOTREL) 5-20 MG per capsule TAKE 1 CAPSULE BY MOUTH DAILY 90 capsule 1    atorvastatin (LIPITOR) 40 MG tablet TAKE 1 TABLET BY MOUTH EVERY NIGHT 90 tablet 0    montelukast (SINGULAIR) 10 MG tablet Take 1 tablet by mouth nightly      albuterol sulfate HFA (PROVENTIL;VENTOLIN;PROAIR) 108 (90 Base) MCG/ACT inhaler Inhale 2 puffs into the lungs every 4 hours as needed      aspirin 81 MG chewable tablet Take 1 tablet by mouth daily      tiotropium (SPIRIVA RESPIMAT) 1.25 MCG/ACT AERS inhaler Inhale 2 puffs into the lungs daily      mepolizumab (NUCALA) 100 MG/ML SOSY injection Inject 1 mL into the skin every 30 days      fluticasone furoate-vilanterol (BREO ELLIPTA) 200-25 MCG/ACT AEPB inhaler Inhale 1 puff into the lungs daily      azelastine (ASTELIN) 0.1 % nasal spray 2 sprays by Nasal route 2 times daily as needed      Multiple Vitamins-Minerals (ONE-A-DAY WOMENS 50+ PO) Take 1 tablet by mouth daily

## 2025-06-20 LAB — HBA1C MFR BLD: 5.7 % (ref 4.8–5.6)

## 2025-06-21 LAB
ALBUMIN SERPL-MCNC: 4.3 G/DL (ref 3.8–4.8)
ALP SERPL-CCNC: 108 IU/L (ref 44–121)
ALT SERPL-CCNC: 24 IU/L (ref 0–32)
AST SERPL-CCNC: 28 IU/L (ref 0–40)
BILIRUB SERPL-MCNC: <0.2 MG/DL (ref 0–1.2)
BUN SERPL-MCNC: 21 MG/DL (ref 8–27)
BUN/CREAT SERPL: 25 (ref 12–28)
CALCIUM SERPL-MCNC: 9.7 MG/DL (ref 8.7–10.3)
CHLORIDE SERPL-SCNC: 104 MMOL/L (ref 96–106)
CHOLEST SERPL-MCNC: 174 MG/DL (ref 100–199)
CO2 SERPL-SCNC: 21 MMOL/L (ref 20–29)
CREAT SERPL-MCNC: 0.83 MG/DL (ref 0.57–1)
EGFRCR SERPLBLD CKD-EPI 2021: 75 ML/MIN/1.73
GLOBULIN SER CALC-MCNC: 1.8 G/DL (ref 1.5–4.5)
GLUCOSE SERPL-MCNC: 79 MG/DL (ref 70–99)
HDLC SERPL-MCNC: 90 MG/DL
LDLC SERPL CALC-MCNC: 72 MG/DL (ref 0–99)
POTASSIUM SERPL-SCNC: 5.1 MMOL/L (ref 3.5–5.2)
PROT SERPL-MCNC: 6.1 G/DL (ref 6–8.5)
SODIUM SERPL-SCNC: 142 MMOL/L (ref 134–144)
TRIGL SERPL-MCNC: 59 MG/DL (ref 0–149)
VLDLC SERPL CALC-MCNC: 12 MG/DL (ref 5–40)

## 2025-06-30 ENCOUNTER — CLINICAL SUPPORT (OUTPATIENT)
Age: 70
End: 2025-06-30

## 2025-06-30 VITALS
HEIGHT: 61 IN | RESPIRATION RATE: 18 BRPM | HEART RATE: 72 BPM | WEIGHT: 217.6 LBS | DIASTOLIC BLOOD PRESSURE: 82 MMHG | OXYGEN SATURATION: 98 % | TEMPERATURE: 98 F | SYSTOLIC BLOOD PRESSURE: 127 MMHG | BODY MASS INDEX: 41.08 KG/M2

## 2025-06-30 DIAGNOSIS — R73.9 BLOOD GLUCOSE ELEVATED: ICD-10-CM

## 2025-06-30 DIAGNOSIS — E04.1 RIGHT THYROID NODULE: ICD-10-CM

## 2025-06-30 DIAGNOSIS — E78.5 HYPERLIPIDEMIA, UNSPECIFIED HYPERLIPIDEMIA TYPE: ICD-10-CM

## 2025-06-30 DIAGNOSIS — E66.813 CLASS 3 SEVERE OBESITY DUE TO EXCESS CALORIES WITH SERIOUS COMORBIDITY AND BODY MASS INDEX (BMI) OF 40.0 TO 44.9 IN ADULT (HCC): Primary | ICD-10-CM

## 2025-06-30 DIAGNOSIS — I10 PRIMARY HYPERTENSION: ICD-10-CM

## 2025-06-30 RX ORDER — METHOCARBAMOL 750 MG/1
750 TABLET, FILM COATED ORAL AS NEEDED
COMMUNITY
Start: 2025-06-03

## 2025-06-30 NOTE — PROGRESS NOTES
6/16/2025    Progress Note: Weekly Education Class in the [facility] Loss [facility]         Patient is on Very Low Calorie Diet [] (4 meal replacements per day, 800 kcal/day)      Low Calorie Diet [x] (2-3 meal replacements per day, 0863-9586 kcal/day)    1) Did patient have any new symptoms or physical problems?   Yes []    No [x]    If yes, check & comment: weakness [], fatigue [], lightheadedness [], headache [], cramps [], cold intolerance [], hair loss [], diarrhea [], constipation [],  NA [] other:                                   2) Has patient had any medical attention from other providers, urgent care or the emergency room this week?  Yes []  No [x]       NA [], If yes, why:                                       3) Any other sugar sweetened beverages consumed this week?   Yes [x]  No []    4) Did patient have any problems adhering to the diet? Yes [x]  No [] NA []    If yes, Vacation [], Celebrations [x], Conferences [], Family Reunions [] other:                                                5) How many hours of sleep this week? 4-7.5    (range)  NA []    Number of meal replacements consumed daily? 1-2 (range)  NA []    Average ounces of water patient consumed daily this week (not including shakes)? 30     (divide the weekly total by 7)    Did you eat any food outside of the program? Yes [] No [x]    Physical Activity Over the Past Week:    Cardio exercise: 45 min  Strength exercise: 2 workouts / week  Number of steps walked per day: 3010-4577    How has patient mood overall been this week? Sad [], Happy [], Stressed [], Tired [], Content [], NA [], other NOT ANSWERED             Medications reconciled by nurse Yes [x]  No[]    Patient was given therapeutic recommendations for any noted side effects of their dietary approach based upon [facility] patient manual per providers recommendation.     6/23/2025    Progress Note: Weekly Education Class in the [facility] Loss [facility]

## 2025-06-30 NOTE — PROGRESS NOTES
Identified pt with two pt identifiers (name and ). Reviewed chart in preparation for visit and have obtained necessary documentation.    Maria G Sanchez is a 69 y.o. female  Chief Complaint   Patient presents with    Weight Management     /82 (BP Site: Left Upper Arm, Patient Position: Sitting, BP Cuff Size: Large Adult)   Pulse 72   Temp 98 °F (36.7 °C) (Oral)   Resp 18   Ht 1.549 m (5' 1\")   Wt 98.7 kg (217 lb 9.6 oz)   SpO2 98%   BMI 41.12 kg/m²     1. Have you been to the ER, urgent care clinic since your last visit?  Hospitalized since your last visit?no    2. Have you seen or consulted any other health care providers outside of the [facility] since your last visit?  Include any pap smears or colon screening. no

## 2025-07-14 ENCOUNTER — CLINICAL SUPPORT (OUTPATIENT)
Age: 70
End: 2025-07-14

## 2025-07-14 VITALS
TEMPERATURE: 98.1 F | BODY MASS INDEX: 41.14 KG/M2 | WEIGHT: 217.9 LBS | RESPIRATION RATE: 20 BRPM | OXYGEN SATURATION: 96 % | SYSTOLIC BLOOD PRESSURE: 114 MMHG | HEIGHT: 61 IN | HEART RATE: 74 BPM | DIASTOLIC BLOOD PRESSURE: 79 MMHG

## 2025-07-14 DIAGNOSIS — E04.1 RIGHT THYROID NODULE: ICD-10-CM

## 2025-07-14 DIAGNOSIS — E78.5 HYPERLIPIDEMIA, UNSPECIFIED HYPERLIPIDEMIA TYPE: ICD-10-CM

## 2025-07-14 DIAGNOSIS — I10 PRIMARY HYPERTENSION: ICD-10-CM

## 2025-07-14 DIAGNOSIS — R73.9 BLOOD GLUCOSE ELEVATED: ICD-10-CM

## 2025-07-14 DIAGNOSIS — E66.813 CLASS 3 SEVERE OBESITY DUE TO EXCESS CALORIES WITH SERIOUS COMORBIDITY AND BODY MASS INDEX (BMI) OF 40.0 TO 44.9 IN ADULT (HCC): Primary | ICD-10-CM

## 2025-07-14 NOTE — PROGRESS NOTES
6/30/2025      Progress Note: Weekly Education Class in the [facility]         Patient is on Very Low Calorie Diet [] (4 meal replacements per day, 800 kcal/day)      Low Calorie Diet [x] (2-3 meal replacements per day, 0086-6626 kcal/day)    1) Did patient have any new symptoms or physical problems?   Yes []    No [x]    If yes, check & comment: weakness [], fatigue [], lightheadedness [], headache [], cramps [], cold intolerance [], hair loss [], diarrhea [], constipation [],  NA [] other:                                 2) Has patient had any medical attention from other providers, urgent care or the emergency room this week?  Yes []  No [x]       NA [], If yes, why:                                       3) Any other sugar sweetened beverages consumed this week?   Yes [x]  No []    4) Did patient have any problems adhering to the diet? Yes [x]  No [] NA []    If yes, Vacation [], Celebrations [x], Conferences [], Family Reunions [] other:                                                5) How many hours of sleep this week? 3.5-6.5    (range)  NA []    Number of meal replacements consumed daily? 1-2 (range)  NA []    Average ounces of water patient consumed daily this week (not including shakes)? 26     (divide the weekly total by 7)    Did you eat any food outside of the program? Yes [x] No []    Physical Activity Over the Past Week:    Cardio exercise: 0 min  Strength exercise: 0 workouts / week  Number of steps walked per day: 4899-9373    How has patient mood overall been this week? Sad [], Happy [], Stressed [], Tired [x], Content [x], NA [], other            Medications reconciled by nurse Yes [x]  No[]    Patient was given therapeutic recommendations for any noted side effects of their dietary approach based upon [facility] patient manual per providers recommendation.     7/6/2025    Progress Note: Weekly Education Class in the [facility] Loss [facility]         Patient is on

## 2025-07-14 NOTE — PROGRESS NOTES
Identified pt with two pt identifiers (name and ). Reviewed chart in preparation for visit and have obtained necessary documentation.    Maria G Sanchez is a 69 y.o. female  Chief Complaint   Patient presents with    Weight Management     /79 (BP Site: Left Upper Arm, Patient Position: Sitting, BP Cuff Size: Large Adult)   Pulse 74   Temp 98.1 °F (36.7 °C) (Oral)   Resp 20   Ht 1.549 m (5' 1\")   Wt 98.8 kg (217 lb 14.4 oz)   SpO2 96%   BMI 41.17 kg/m²     1. Have you been to the ER, urgent care clinic since your last visit?  Hospitalized since your last visit?no    2. Have you seen or consulted any other health care providers outside of the [facility] since your last visit?  Include any pap smears or colon screening. no

## 2025-07-22 NOTE — PROGRESS NOTES
Nurse note from patient's weekly / LCD / Maintenance class was reviewed.  Pertinent medical concerns were:   reviewed     BP Readings from Last 3 Encounters:   07/14/25 114/79   06/30/25 127/82   06/16/25 128/85       Failed to redirect to the [facility] the [facility].    Current Outpatient Medications   Medication Sig Dispense Refill    methocarbamol (ROBAXIN) 750 MG tablet Take 1 tablet by mouth as needed      alendronate (FOSAMAX) 35 MG tablet Take 1 tablet by mouth every 7 days      buPROPion (WELLBUTRIN SR) 150 MG extended release tablet Take 1 tablet by mouth 2 times daily 60 tablet 2    Acetaminophen Extra Strength 500 MG TABS Take 2 tablets by mouth every 6 hours as needed      cyclobenzaprine (FLEXERIL) 5 MG tablet TAKE 1 TABLET BY MOUTH AT BEDTIME AS NEEDED FOR MUSCLE PAIN      ibuprofen (ADVIL;MOTRIN) 800 MG tablet Take 1 tablet by mouth every 6 hours as needed for Pain      Cholecalciferol (VITAMIN D3) 50 MCG (2000 UT) TABS TAKE 1 TABLET BY MOUTH DAILY 90 tablet 1    amLODIPine-benazepril (LOTREL) 5-20 MG per capsule TAKE 1 CAPSULE BY MOUTH DAILY 90 capsule 1    atorvastatin (LIPITOR) 40 MG tablet TAKE 1 TABLET BY MOUTH EVERY NIGHT 90 tablet 0    montelukast (SINGULAIR) 10 MG tablet Take 1 tablet by mouth nightly      albuterol sulfate HFA (PROVENTIL;VENTOLIN;PROAIR) 108 (90 Base) MCG/ACT inhaler Inhale 2 puffs into the lungs every 4 hours as needed      aspirin 81 MG chewable tablet Take 1 tablet by mouth daily      tiotropium (SPIRIVA RESPIMAT) 1.25 MCG/ACT AERS inhaler Inhale 2 puffs into the lungs daily      mepolizumab (NUCALA) 100 MG/ML SOSY injection Inject 1 mL into the skin every 30 days      fluticasone furoate-vilanterol (BREO ELLIPTA) 200-25 MCG/ACT AEPB inhaler Inhale 1 puff into the lungs daily      azelastine (ASTELIN) 0.1 % nasal spray 2 sprays by Nasal route 2 times daily as needed      Multiple Vitamins-Minerals (ONE-A-DAY WOMENS 50+ PO) Take 1 tablet by mouth daily

## 2025-07-30 ENCOUNTER — OFFICE VISIT (OUTPATIENT)
Age: 70
End: 2025-07-30

## 2025-07-30 DIAGNOSIS — F50.9 EATING DISORDER, UNSPECIFIED TYPE: Primary | ICD-10-CM

## 2025-07-30 DIAGNOSIS — E66.01 MORBID OBESITY (HCC): ICD-10-CM

## 2025-07-30 NOTE — PROGRESS NOTES
[facility]  Psychological Intake Assessment    *CONFIDENTIAL REPORT*    This note will not be viewable in [facility] for the following reason(s). This is a Psychotherapy Note. (Behavioral Health Providers Only)    Date of Intake: 2025   Start Time:  10:04 AM  End Time:  10:47 AM  CPT code: 62519  Telehealth (Y/N):  N     Name: Maria G Sanchez      :  1955   Gender:  female   Marital Status:       Race/Ethnicity:  Black /      Reason for Visit:  Intake Psychological Assessment.  Maria G Sanchez is a , Black / , female, referred by Dr. Urbina for counseling services.  This patient is 5' 1\" tall and weighs 217 lbs.     Subjective (patient report):  Patient acknowledged taking medication and denied any side-effects.  Did not endorse any manic or psychotic symptoms. Undersigned provided supportive psychotherapy.     Maria G presented with positive affect. She reported that she is coming back for counseling after being gone for a year due to frustrations with not being able to lose weight. She has been staying in about the same wait for over a year. She had initially lost about 25 lbs in the weight loss program but has plateaued and is feeling frustrated. She reports that she had the gastric bypass procedure in . She currently has her grandson living with her and finds that she's been doing a lot of cooking of meals for him and eating more often than she had prior to him living with her. She feels that she has improved with snacking although she does still keep cookies in her cookie jar and expressed that her biggest jono is drinking Pepsi. She reports drinking three cans of soda per day and feels that this is the hardest to give up as it has been a lifelong habit. She continues to go to the [facility] and states that in February she hurt her leg but she is still doing a gentle strength class with other elderly women three times a week at the

## 2025-08-05 ENCOUNTER — OFFICE VISIT (OUTPATIENT)
Age: 70
End: 2025-08-05
Payer: MEDICARE

## 2025-08-05 VITALS
WEIGHT: 215.1 LBS | RESPIRATION RATE: 17 BRPM | OXYGEN SATURATION: 95 % | BODY MASS INDEX: 40.61 KG/M2 | HEART RATE: 72 BPM | HEIGHT: 61 IN | DIASTOLIC BLOOD PRESSURE: 84 MMHG | TEMPERATURE: 98.2 F | SYSTOLIC BLOOD PRESSURE: 132 MMHG

## 2025-08-05 DIAGNOSIS — E78.5 HYPERLIPIDEMIA, UNSPECIFIED HYPERLIPIDEMIA TYPE: ICD-10-CM

## 2025-08-05 DIAGNOSIS — I10 PRIMARY HYPERTENSION: ICD-10-CM

## 2025-08-05 DIAGNOSIS — E66.813 CLASS 3 SEVERE OBESITY DUE TO EXCESS CALORIES WITH SERIOUS COMORBIDITY AND BODY MASS INDEX (BMI) OF 40.0 TO 44.9 IN ADULT (HCC): Primary | ICD-10-CM

## 2025-08-05 DIAGNOSIS — E04.1 RIGHT THYROID NODULE: ICD-10-CM

## 2025-08-05 DIAGNOSIS — R73.9 BLOOD GLUCOSE ELEVATED: ICD-10-CM

## 2025-08-05 DIAGNOSIS — Z86.73 HISTORY OF TRANSIENT ISCHEMIC ATTACK (TIA): ICD-10-CM

## 2025-08-05 PROCEDURE — 3079F DIAST BP 80-89 MM HG: CPT | Performed by: FAMILY MEDICINE

## 2025-08-05 PROCEDURE — 3075F SYST BP GE 130 - 139MM HG: CPT | Performed by: FAMILY MEDICINE

## 2025-08-05 PROCEDURE — 1090F PRES/ABSN URINE INCON ASSESS: CPT | Performed by: FAMILY MEDICINE

## 2025-08-05 PROCEDURE — 1123F ACP DISCUSS/DSCN MKR DOCD: CPT | Performed by: FAMILY MEDICINE

## 2025-08-05 PROCEDURE — 1126F AMNT PAIN NOTED NONE PRSNT: CPT | Performed by: FAMILY MEDICINE

## 2025-08-05 PROCEDURE — 1159F MED LIST DOCD IN RCRD: CPT | Performed by: FAMILY MEDICINE

## 2025-08-05 PROCEDURE — 99214 OFFICE O/P EST MOD 30 MIN: CPT | Performed by: FAMILY MEDICINE

## 2025-08-05 PROCEDURE — 3017F COLORECTAL CA SCREEN DOC REV: CPT | Performed by: FAMILY MEDICINE

## 2025-08-05 PROCEDURE — 1036F TOBACCO NON-USER: CPT | Performed by: FAMILY MEDICINE

## 2025-08-05 PROCEDURE — G8417 CALC BMI ABV UP PARAM F/U: HCPCS | Performed by: FAMILY MEDICINE

## 2025-08-05 PROCEDURE — G8399 PT W/DXA RESULTS DOCUMENT: HCPCS | Performed by: FAMILY MEDICINE

## 2025-08-05 PROCEDURE — G8427 DOCREV CUR MEDS BY ELIG CLIN: HCPCS | Performed by: FAMILY MEDICINE

## 2025-08-05 RX ORDER — TOPIRAMATE 25 MG/1
25 TABLET, FILM COATED ORAL
Qty: 60 TABLET | Refills: 2 | Status: SHIPPED | OUTPATIENT
Start: 2025-08-05

## 2025-08-19 ENCOUNTER — TELEMEDICINE (OUTPATIENT)
Age: 70
End: 2025-08-19
Payer: MEDICARE

## 2025-08-19 DIAGNOSIS — E66.01 MORBID OBESITY (HCC): ICD-10-CM

## 2025-08-19 DIAGNOSIS — F50.9 EATING DISORDER, UNSPECIFIED TYPE: Primary | ICD-10-CM

## 2025-08-19 PROCEDURE — 1123F ACP DISCUSS/DSCN MKR DOCD: CPT | Performed by: COUNSELOR

## 2025-08-19 PROCEDURE — 90832 PSYTX W PT 30 MINUTES: CPT | Performed by: COUNSELOR

## 2025-08-20 ENCOUNTER — HOSPITAL ENCOUNTER (OUTPATIENT)
Facility: HOSPITAL | Age: 70
Discharge: HOME | End: 2025-08-23
Payer: MEDICARE

## 2025-08-20 VITALS — WEIGHT: 216 LBS | BODY MASS INDEX: 40.78 KG/M2 | HEIGHT: 61 IN

## 2025-08-20 DIAGNOSIS — Z12.31 VISIT FOR SCREENING MAMMOGRAM: ICD-10-CM

## 2025-08-20 PROCEDURE — 77063 BREAST TOMOSYNTHESIS BI: CPT

## 2025-08-21 ENCOUNTER — CLINICAL SUPPORT (OUTPATIENT)
Age: 70
End: 2025-08-21

## 2025-08-21 VITALS
DIASTOLIC BLOOD PRESSURE: 81 MMHG | WEIGHT: 212.9 LBS | HEIGHT: 61 IN | OXYGEN SATURATION: 95 % | SYSTOLIC BLOOD PRESSURE: 125 MMHG | HEART RATE: 71 BPM | RESPIRATION RATE: 16 BRPM | BODY MASS INDEX: 40.2 KG/M2 | TEMPERATURE: 98.6 F

## 2025-08-21 DIAGNOSIS — E78.5 HYPERLIPIDEMIA, UNSPECIFIED HYPERLIPIDEMIA TYPE: ICD-10-CM

## 2025-08-21 DIAGNOSIS — E66.813 CLASS 3 SEVERE OBESITY DUE TO EXCESS CALORIES WITH SERIOUS COMORBIDITY AND BODY MASS INDEX (BMI) OF 40.0 TO 44.9 IN ADULT (HCC): Primary | ICD-10-CM

## 2025-08-21 DIAGNOSIS — R73.9 BLOOD GLUCOSE ELEVATED: ICD-10-CM

## 2025-08-21 DIAGNOSIS — I10 PRIMARY HYPERTENSION: ICD-10-CM

## 2025-08-21 DIAGNOSIS — E04.1 RIGHT THYROID NODULE: ICD-10-CM
